# Patient Record
Sex: FEMALE | Race: OTHER | HISPANIC OR LATINO | URBAN - METROPOLITAN AREA
[De-identification: names, ages, dates, MRNs, and addresses within clinical notes are randomized per-mention and may not be internally consistent; named-entity substitution may affect disease eponyms.]

---

## 2017-01-09 ENCOUNTER — OUTPATIENT (OUTPATIENT)
Dept: OUTPATIENT SERVICES | Facility: HOSPITAL | Age: 57
LOS: 1 days | End: 2017-01-09
Payer: SELF-PAY

## 2017-01-09 ENCOUNTER — APPOINTMENT (OUTPATIENT)
Dept: FAMILY MEDICINE | Facility: HOSPITAL | Age: 57
End: 2017-01-09

## 2017-01-09 VITALS
BODY MASS INDEX: 25.72 KG/M2 | TEMPERATURE: 98 F | HEART RATE: 72 BPM | WEIGHT: 131 LBS | HEIGHT: 60 IN | RESPIRATION RATE: 14 BRPM | DIASTOLIC BLOOD PRESSURE: 60 MMHG | SYSTOLIC BLOOD PRESSURE: 100 MMHG

## 2017-01-09 DIAGNOSIS — H57.10 OCULAR PAIN, UNSPECIFIED EYE: ICD-10-CM

## 2017-01-09 PROCEDURE — G0463: CPT

## 2017-01-10 ENCOUNTER — OUTPATIENT (OUTPATIENT)
Dept: OUTPATIENT SERVICES | Facility: HOSPITAL | Age: 57
LOS: 1 days | End: 2017-01-10

## 2017-01-19 ENCOUNTER — APPOINTMENT (OUTPATIENT)
Dept: OPHTHALMOLOGY | Facility: CLINIC | Age: 57
End: 2017-01-19

## 2017-03-09 ENCOUNTER — APPOINTMENT (OUTPATIENT)
Dept: NEUROLOGY | Facility: HOSPITAL | Age: 57
End: 2017-03-09

## 2017-03-09 ENCOUNTER — OUTPATIENT (OUTPATIENT)
Dept: OUTPATIENT SERVICES | Facility: HOSPITAL | Age: 57
LOS: 1 days | End: 2017-03-09
Payer: SELF-PAY

## 2017-03-09 VITALS
RESPIRATION RATE: 14 BRPM | WEIGHT: 133 LBS | HEIGHT: 60 IN | HEART RATE: 76 BPM | BODY MASS INDEX: 26.11 KG/M2 | SYSTOLIC BLOOD PRESSURE: 102 MMHG | DIASTOLIC BLOOD PRESSURE: 70 MMHG

## 2017-03-09 DIAGNOSIS — D44.3 NEOPLASM OF UNCERTAIN BEHAVIOR OF PITUITARY GLAND: ICD-10-CM

## 2017-03-09 DIAGNOSIS — G44.099 OTHER TRIGEMINAL AUTONOMIC CEPHALGIAS (TAC), NOT INTRACTABLE: ICD-10-CM

## 2017-03-09 DIAGNOSIS — E78.5 HYPERLIPIDEMIA, UNSPECIFIED: ICD-10-CM

## 2017-03-09 DIAGNOSIS — R51 HEADACHE: ICD-10-CM

## 2017-03-09 DIAGNOSIS — Z87.898 PERSONAL HISTORY OF OTHER SPECIFIED CONDITIONS: ICD-10-CM

## 2017-03-09 PROCEDURE — G0463: CPT

## 2017-03-09 RX ORDER — TOBRAMYCIN 3 MG/ML
0.3 SOLUTION/ DROPS OPHTHALMIC 4 TIMES DAILY
Qty: 1 | Refills: 0 | Status: DISCONTINUED | COMMUNITY
Start: 2017-01-09 | End: 2017-03-09

## 2017-05-08 DIAGNOSIS — H04.309 UNSPECIFIED DACRYOCYSTITIS OF UNSPECIFIED LACRIMAL PASSAGE: ICD-10-CM

## 2017-06-07 ENCOUNTER — EMERGENCY (EMERGENCY)
Facility: HOSPITAL | Age: 57
LOS: 1 days | Discharge: HOME | End: 2017-06-07
Admitting: EMERGENCY MEDICINE
Payer: MEDICAID

## 2017-06-07 DIAGNOSIS — R42 DIZZINESS AND GIDDINESS: ICD-10-CM

## 2017-06-07 DIAGNOSIS — K57.32 DIVERTICULITIS OF LARGE INTESTINE WITHOUT PERFORATION OR ABSCESS WITHOUT BLEEDING: ICD-10-CM

## 2017-06-07 DIAGNOSIS — Z79.2 LONG TERM (CURRENT) USE OF ANTIBIOTICS: ICD-10-CM

## 2017-06-07 DIAGNOSIS — Z88.5 ALLERGY STATUS TO NARCOTIC AGENT: ICD-10-CM

## 2017-06-07 DIAGNOSIS — R10.9 UNSPECIFIED ABDOMINAL PAIN: ICD-10-CM

## 2017-06-07 PROCEDURE — 74177 CT ABD & PELVIS W/CONTRAST: CPT | Mod: 26

## 2017-06-07 PROCEDURE — 99285 EMERGENCY DEPT VISIT HI MDM: CPT | Mod: 25

## 2017-06-07 PROCEDURE — 99284 EMERGENCY DEPT VISIT MOD MDM: CPT

## 2017-06-07 PROCEDURE — 71010: CPT | Mod: 26

## 2017-06-08 PROCEDURE — 87086 URINE CULTURE/COLONY COUNT: CPT

## 2017-06-08 PROCEDURE — 85027 COMPLETE CBC AUTOMATED: CPT

## 2017-06-08 PROCEDURE — 83605 ASSAY OF LACTIC ACID: CPT

## 2017-06-08 PROCEDURE — 96374 THER/PROPH/DIAG INJ IV PUSH: CPT

## 2017-06-08 PROCEDURE — 99284 EMERGENCY DEPT VISIT MOD MDM: CPT | Mod: 25

## 2017-06-08 PROCEDURE — 80076 HEPATIC FUNCTION PANEL: CPT

## 2017-06-08 PROCEDURE — 74177 CT ABD & PELVIS W/CONTRAST: CPT

## 2017-06-08 PROCEDURE — 85730 THROMBOPLASTIN TIME PARTIAL: CPT

## 2017-06-08 PROCEDURE — 80048 BASIC METABOLIC PNL TOTAL CA: CPT

## 2017-06-08 PROCEDURE — 71045 X-RAY EXAM CHEST 1 VIEW: CPT

## 2017-06-08 PROCEDURE — 96374 THER/PROPH/DIAG INJ IV PUSH: CPT | Mod: XU

## 2017-06-08 PROCEDURE — 80053 COMPREHEN METABOLIC PANEL: CPT

## 2017-06-08 PROCEDURE — 81003 URINALYSIS AUTO W/O SCOPE: CPT

## 2017-06-08 PROCEDURE — 82150 ASSAY OF AMYLASE: CPT

## 2017-06-08 PROCEDURE — 83690 ASSAY OF LIPASE: CPT

## 2017-06-08 PROCEDURE — 96375 TX/PRO/DX INJ NEW DRUG ADDON: CPT

## 2017-06-08 PROCEDURE — 96361 HYDRATE IV INFUSION ADD-ON: CPT

## 2017-06-08 PROCEDURE — 85610 PROTHROMBIN TIME: CPT

## 2017-06-09 ENCOUNTER — APPOINTMENT (OUTPATIENT)
Dept: FAMILY MEDICINE | Facility: HOSPITAL | Age: 57
End: 2017-06-09

## 2017-06-09 ENCOUNTER — OUTPATIENT (OUTPATIENT)
Dept: OUTPATIENT SERVICES | Facility: HOSPITAL | Age: 57
LOS: 1 days | End: 2017-06-09
Payer: SELF-PAY

## 2017-06-09 VITALS
OXYGEN SATURATION: 99 % | WEIGHT: 137 LBS | HEIGHT: 60 IN | SYSTOLIC BLOOD PRESSURE: 101 MMHG | HEART RATE: 78 BPM | BODY MASS INDEX: 26.9 KG/M2 | DIASTOLIC BLOOD PRESSURE: 73 MMHG | TEMPERATURE: 97 F | RESPIRATION RATE: 14 BRPM

## 2017-06-09 DIAGNOSIS — K57.92 DIVERTICULITIS OF INTESTINE, PART UNSPECIFIED, WITHOUT PERFORATION OR ABSCESS WITHOUT BLEEDING: ICD-10-CM

## 2017-06-09 PROCEDURE — G0463: CPT

## 2017-06-11 ENCOUNTER — FORM ENCOUNTER (OUTPATIENT)
Age: 57
End: 2017-06-11

## 2017-06-12 ENCOUNTER — OUTPATIENT (OUTPATIENT)
Dept: OUTPATIENT SERVICES | Facility: HOSPITAL | Age: 57
LOS: 1 days | End: 2017-06-12
Payer: SELF-PAY

## 2017-06-12 ENCOUNTER — APPOINTMENT (OUTPATIENT)
Dept: MAMMOGRAPHY | Facility: HOSPITAL | Age: 57
End: 2017-06-12

## 2017-06-12 DIAGNOSIS — Z12.31 ENCOUNTER FOR SCREENING MAMMOGRAM FOR MALIGNANT NEOPLASM OF BREAST: ICD-10-CM

## 2017-06-12 PROCEDURE — 77063 BREAST TOMOSYNTHESIS BI: CPT

## 2017-06-12 PROCEDURE — 77067 SCR MAMMO BI INCL CAD: CPT

## 2017-06-15 ENCOUNTER — APPOINTMENT (OUTPATIENT)
Dept: ENDOCRINOLOGY | Facility: HOSPITAL | Age: 57
End: 2017-06-15

## 2017-06-15 ENCOUNTER — OUTPATIENT (OUTPATIENT)
Dept: OUTPATIENT SERVICES | Facility: HOSPITAL | Age: 57
LOS: 1 days | End: 2017-06-15
Payer: SELF-PAY

## 2017-06-15 VITALS
HEART RATE: 70 BPM | BODY MASS INDEX: 25.32 KG/M2 | SYSTOLIC BLOOD PRESSURE: 120 MMHG | HEIGHT: 60 IN | DIASTOLIC BLOOD PRESSURE: 70 MMHG | WEIGHT: 129 LBS

## 2017-06-15 DIAGNOSIS — E06.9 THYROIDITIS, UNSPECIFIED: ICD-10-CM

## 2017-06-15 DIAGNOSIS — M85.80 OTHER SPECIFIED DISORDERS OF BONE DENSITY AND STRUCTURE, UNSPECIFIED SITE: ICD-10-CM

## 2017-06-15 DIAGNOSIS — E03.8 OTHER SPECIFIED HYPOTHYROIDISM: ICD-10-CM

## 2017-06-15 LAB
24R-OH-CALCIDIOL SERPL-MCNC: 40 NG/ML — SIGNIFICANT CHANGE UP (ref 30–100)
ALBUMIN SERPL ELPH-MCNC: 4.5 G/DL — SIGNIFICANT CHANGE UP (ref 3.3–5)
ALP SERPL-CCNC: 72 U/L — SIGNIFICANT CHANGE UP (ref 40–120)
ALT FLD-CCNC: 30 U/L — SIGNIFICANT CHANGE UP (ref 10–45)
ANION GAP SERPL CALC-SCNC: 14 MMOL/L — SIGNIFICANT CHANGE UP (ref 5–17)
AST SERPL-CCNC: 39 U/L — SIGNIFICANT CHANGE UP (ref 10–40)
BASOPHILS # BLD AUTO: 0.02 K/UL — SIGNIFICANT CHANGE UP (ref 0–0.2)
BASOPHILS NFR BLD AUTO: 0.4 % — SIGNIFICANT CHANGE UP (ref 0–2)
BILIRUB SERPL-MCNC: 0.4 MG/DL — SIGNIFICANT CHANGE UP (ref 0.2–1.2)
BUN SERPL-MCNC: 16 MG/DL — SIGNIFICANT CHANGE UP (ref 7–23)
CALCIUM SERPL-MCNC: 10.5 MG/DL — SIGNIFICANT CHANGE UP (ref 8.4–10.5)
CHLORIDE SERPL-SCNC: 103 MMOL/L — SIGNIFICANT CHANGE UP (ref 96–108)
CHOLEST SERPL-MCNC: 187 MG/DL — SIGNIFICANT CHANGE UP (ref 10–199)
CO2 SERPL-SCNC: 26 MMOL/L — SIGNIFICANT CHANGE UP (ref 22–31)
CREAT SERPL-MCNC: 0.83 MG/DL — SIGNIFICANT CHANGE UP (ref 0.5–1.3)
EOSINOPHIL # BLD AUTO: 0.08 K/UL — SIGNIFICANT CHANGE UP (ref 0–0.5)
EOSINOPHIL NFR BLD AUTO: 1.7 % — SIGNIFICANT CHANGE UP (ref 0–6)
GLUCOSE SERPL-MCNC: 88 MG/DL — SIGNIFICANT CHANGE UP (ref 70–99)
HCT VFR BLD CALC: 42.6 % — SIGNIFICANT CHANGE UP (ref 34.5–45)
HDLC SERPL-MCNC: 51 MG/DL — SIGNIFICANT CHANGE UP (ref 40–125)
HGB BLD-MCNC: 14.3 G/DL — SIGNIFICANT CHANGE UP (ref 11.5–15.5)
IMM GRANULOCYTES NFR BLD AUTO: 0 % — SIGNIFICANT CHANGE UP (ref 0–1.5)
LIPID PNL WITH DIRECT LDL SERPL: 109 MG/DL — SIGNIFICANT CHANGE UP
LYMPHOCYTES # BLD AUTO: 1.92 K/UL — SIGNIFICANT CHANGE UP (ref 1–3.3)
LYMPHOCYTES # BLD AUTO: 41.2 % — SIGNIFICANT CHANGE UP (ref 13–44)
MCHC RBC-ENTMCNC: 29.4 PG — SIGNIFICANT CHANGE UP (ref 27–34)
MCHC RBC-ENTMCNC: 33.6 GM/DL — SIGNIFICANT CHANGE UP (ref 32–36)
MCV RBC AUTO: 87.7 FL — SIGNIFICANT CHANGE UP (ref 80–100)
MONOCYTES # BLD AUTO: 0.31 K/UL — SIGNIFICANT CHANGE UP (ref 0–0.9)
MONOCYTES NFR BLD AUTO: 6.7 % — SIGNIFICANT CHANGE UP (ref 2–14)
NEUTROPHILS # BLD AUTO: 2.33 K/UL — SIGNIFICANT CHANGE UP (ref 1.8–7.4)
NEUTROPHILS NFR BLD AUTO: 50 % — SIGNIFICANT CHANGE UP (ref 43–77)
PLATELET # BLD AUTO: 392 K/UL — SIGNIFICANT CHANGE UP (ref 150–400)
POTASSIUM SERPL-MCNC: 5.2 MMOL/L — SIGNIFICANT CHANGE UP (ref 3.5–5.3)
POTASSIUM SERPL-SCNC: 5.2 MMOL/L — SIGNIFICANT CHANGE UP (ref 3.5–5.3)
PROT SERPL-MCNC: 8.2 G/DL — SIGNIFICANT CHANGE UP (ref 6–8.3)
RBC # BLD: 4.86 M/UL — SIGNIFICANT CHANGE UP (ref 3.8–5.2)
RBC # FLD: 13.4 % — SIGNIFICANT CHANGE UP (ref 10.3–14.5)
SODIUM SERPL-SCNC: 143 MMOL/L — SIGNIFICANT CHANGE UP (ref 135–145)
T4 FREE SERPL-MCNC: 1.4 NG/DL — SIGNIFICANT CHANGE UP (ref 0.9–1.8)
TOTAL CHOLESTEROL/HDL RATIO MEASUREMENT: 3.7 RATIO — SIGNIFICANT CHANGE UP (ref 3.3–7.1)
TRIGL SERPL-MCNC: 136 MG/DL — SIGNIFICANT CHANGE UP (ref 10–149)
WBC # BLD: 4.66 K/UL — SIGNIFICANT CHANGE UP (ref 3.8–10.5)
WBC # FLD AUTO: 4.66 K/UL — SIGNIFICANT CHANGE UP (ref 3.8–10.5)

## 2017-06-15 PROCEDURE — 80053 COMPREHEN METABOLIC PANEL: CPT

## 2017-06-15 PROCEDURE — 36415 COLL VENOUS BLD VENIPUNCTURE: CPT

## 2017-06-15 PROCEDURE — G0463: CPT

## 2017-06-15 PROCEDURE — 85027 COMPLETE CBC AUTOMATED: CPT

## 2017-06-15 PROCEDURE — 82306 VITAMIN D 25 HYDROXY: CPT

## 2017-06-15 PROCEDURE — 80061 LIPID PANEL: CPT

## 2017-06-15 PROCEDURE — 84439 ASSAY OF FREE THYROXINE: CPT

## 2017-06-21 ENCOUNTER — APPOINTMENT (OUTPATIENT)
Dept: FAMILY MEDICINE | Facility: HOSPITAL | Age: 57
End: 2017-06-21

## 2017-06-30 ENCOUNTER — EMERGENCY (EMERGENCY)
Facility: HOSPITAL | Age: 57
LOS: 1 days | Discharge: HOME | End: 2017-06-30
Admitting: EMERGENCY MEDICINE
Payer: MEDICAID

## 2017-06-30 DIAGNOSIS — M25.50 PAIN IN UNSPECIFIED JOINT: ICD-10-CM

## 2017-06-30 DIAGNOSIS — Z79.899 OTHER LONG TERM (CURRENT) DRUG THERAPY: ICD-10-CM

## 2017-06-30 DIAGNOSIS — Z88.5 ALLERGY STATUS TO NARCOTIC AGENT: ICD-10-CM

## 2017-06-30 DIAGNOSIS — R10.84 GENERALIZED ABDOMINAL PAIN: ICD-10-CM

## 2017-06-30 DIAGNOSIS — E78.00 PURE HYPERCHOLESTEROLEMIA, UNSPECIFIED: ICD-10-CM

## 2017-06-30 DIAGNOSIS — K57.32 DIVERTICULITIS OF LARGE INTESTINE WITHOUT PERFORATION OR ABSCESS WITHOUT BLEEDING: ICD-10-CM

## 2017-06-30 PROCEDURE — 83690 ASSAY OF LIPASE: CPT

## 2017-06-30 PROCEDURE — 99284 EMERGENCY DEPT VISIT MOD MDM: CPT | Mod: 25

## 2017-06-30 PROCEDURE — 71046 X-RAY EXAM CHEST 2 VIEWS: CPT

## 2017-06-30 PROCEDURE — 71020: CPT | Mod: 26

## 2017-06-30 PROCEDURE — 80053 COMPREHEN METABOLIC PANEL: CPT

## 2017-06-30 PROCEDURE — 74177 CT ABD & PELVIS W/CONTRAST: CPT

## 2017-06-30 PROCEDURE — 87086 URINE CULTURE/COLONY COUNT: CPT

## 2017-06-30 PROCEDURE — 96361 HYDRATE IV INFUSION ADD-ON: CPT

## 2017-06-30 PROCEDURE — 81003 URINALYSIS AUTO W/O SCOPE: CPT

## 2017-06-30 PROCEDURE — 96374 THER/PROPH/DIAG INJ IV PUSH: CPT | Mod: XU

## 2017-06-30 PROCEDURE — 85027 COMPLETE CBC AUTOMATED: CPT

## 2017-06-30 PROCEDURE — 85610 PROTHROMBIN TIME: CPT

## 2017-06-30 PROCEDURE — 99285 EMERGENCY DEPT VISIT HI MDM: CPT

## 2017-06-30 PROCEDURE — 85730 THROMBOPLASTIN TIME PARTIAL: CPT

## 2017-06-30 PROCEDURE — 74177 CT ABD & PELVIS W/CONTRAST: CPT | Mod: 26

## 2017-07-10 ENCOUNTER — OUTPATIENT (OUTPATIENT)
Dept: OUTPATIENT SERVICES | Facility: HOSPITAL | Age: 57
LOS: 1 days | End: 2017-07-10
Payer: MEDICAID

## 2017-07-10 ENCOUNTER — APPOINTMENT (OUTPATIENT)
Dept: RADIOLOGY | Facility: HOSPITAL | Age: 57
End: 2017-07-10

## 2017-07-10 DIAGNOSIS — M85.88 OTHER SPECIFIED DISORDERS OF BONE DENSITY AND STRUCTURE, OTHER SITE: ICD-10-CM

## 2017-07-10 DIAGNOSIS — Z78.0 ASYMPTOMATIC MENOPAUSAL STATE: ICD-10-CM

## 2017-07-13 ENCOUNTER — APPOINTMENT (OUTPATIENT)
Dept: NEUROLOGY | Facility: HOSPITAL | Age: 57
End: 2017-07-13

## 2017-07-19 ENCOUNTER — OUTPATIENT (OUTPATIENT)
Dept: OUTPATIENT SERVICES | Facility: HOSPITAL | Age: 57
LOS: 1 days | End: 2017-07-19
Payer: SELF-PAY

## 2017-07-19 ENCOUNTER — APPOINTMENT (OUTPATIENT)
Dept: FAMILY MEDICINE | Facility: HOSPITAL | Age: 57
End: 2017-07-19

## 2017-07-19 VITALS
OXYGEN SATURATION: 100 % | SYSTOLIC BLOOD PRESSURE: 148 MMHG | HEART RATE: 84 BPM | RESPIRATION RATE: 14 BRPM | TEMPERATURE: 97.6 F | DIASTOLIC BLOOD PRESSURE: 84 MMHG | WEIGHT: 126 LBS | BODY MASS INDEX: 24.61 KG/M2

## 2017-07-19 DIAGNOSIS — K57.92 DIVERTICULITIS OF INTESTINE, PART UNSPECIFIED, WITHOUT PERFORATION OR ABSCESS WITHOUT BLEEDING: ICD-10-CM

## 2017-07-19 DIAGNOSIS — R10.13 EPIGASTRIC PAIN: ICD-10-CM

## 2017-07-19 RX ORDER — PANTOPRAZOLE 40 MG/1
40 TABLET, DELAYED RELEASE ORAL DAILY
Qty: 30 | Refills: 0 | Status: DISCONTINUED | COMMUNITY
Start: 2017-01-25 | End: 2017-07-19

## 2017-07-19 RX ORDER — DOCUSATE SODIUM 100 MG/1
100 CAPSULE ORAL
Qty: 1 | Refills: 0 | Status: DISCONTINUED | COMMUNITY
Start: 2017-06-09 | End: 2017-07-19

## 2017-07-20 ENCOUNTER — FORM ENCOUNTER (OUTPATIENT)
Age: 57
End: 2017-07-20

## 2017-07-21 PROCEDURE — 76700 US EXAM ABDOM COMPLETE: CPT

## 2017-07-21 PROCEDURE — G0463: CPT

## 2017-08-01 ENCOUNTER — APPOINTMENT (OUTPATIENT)
Dept: GASTROENTEROLOGY | Facility: HOSPITAL | Age: 57
End: 2017-08-01

## 2017-08-01 ENCOUNTER — OUTPATIENT (OUTPATIENT)
Dept: OUTPATIENT SERVICES | Facility: HOSPITAL | Age: 57
LOS: 1 days | End: 2017-08-01
Payer: SELF-PAY

## 2017-08-01 VITALS
HEIGHT: 60 IN | HEART RATE: 83 BPM | DIASTOLIC BLOOD PRESSURE: 71 MMHG | BODY MASS INDEX: 24.15 KG/M2 | WEIGHT: 123 LBS | SYSTOLIC BLOOD PRESSURE: 107 MMHG

## 2017-08-01 DIAGNOSIS — R10.9 UNSPECIFIED ABDOMINAL PAIN: ICD-10-CM

## 2017-08-01 DIAGNOSIS — R10.11 RIGHT UPPER QUADRANT PAIN: ICD-10-CM

## 2017-08-01 LAB
ALBUMIN SERPL ELPH-MCNC: 4.3 G/DL — SIGNIFICANT CHANGE UP (ref 3.3–5)
ALP SERPL-CCNC: 75 U/L — SIGNIFICANT CHANGE UP (ref 40–120)
ALT FLD-CCNC: 10 U/L — SIGNIFICANT CHANGE UP (ref 10–45)
AMYLASE P1 CFR SERPL: 57 U/L — SIGNIFICANT CHANGE UP (ref 25–125)
ANION GAP SERPL CALC-SCNC: 12 MMOL/L — SIGNIFICANT CHANGE UP (ref 5–17)
AST SERPL-CCNC: 23 U/L — SIGNIFICANT CHANGE UP (ref 10–40)
BILIRUB SERPL-MCNC: 0.2 MG/DL — SIGNIFICANT CHANGE UP (ref 0.2–1.2)
BUN SERPL-MCNC: 16 MG/DL — SIGNIFICANT CHANGE UP (ref 7–23)
CALCIUM SERPL-MCNC: 9.7 MG/DL — SIGNIFICANT CHANGE UP (ref 8.4–10.5)
CHLORIDE SERPL-SCNC: 99 MMOL/L — SIGNIFICANT CHANGE UP (ref 96–108)
CHOLEST SERPL-MCNC: 201 MG/DL — HIGH (ref 10–199)
CO2 SERPL-SCNC: 28 MMOL/L — SIGNIFICANT CHANGE UP (ref 22–31)
CREAT SERPL-MCNC: 0.77 MG/DL — SIGNIFICANT CHANGE UP (ref 0.5–1.3)
CRP SERPL-MCNC: <0.2 MG/DL — SIGNIFICANT CHANGE UP (ref 0–0.4)
ERYTHROCYTE [SEDIMENTATION RATE] IN BLOOD: 9 MM/HR — SIGNIFICANT CHANGE UP (ref 0–20)
GLUCOSE SERPL-MCNC: 95 MG/DL — SIGNIFICANT CHANGE UP (ref 70–99)
HBA1C BLD-MCNC: 5.3 % — SIGNIFICANT CHANGE UP (ref 4–5.6)
HDLC SERPL-MCNC: 32 MG/DL — LOW (ref 40–125)
LIDOCAIN IGE QN: 35 U/L — SIGNIFICANT CHANGE UP (ref 7–60)
LIPID PNL WITH DIRECT LDL SERPL: 116 MG/DL — SIGNIFICANT CHANGE UP
POTASSIUM SERPL-MCNC: 4.3 MMOL/L — SIGNIFICANT CHANGE UP (ref 3.5–5.3)
POTASSIUM SERPL-SCNC: 4.3 MMOL/L — SIGNIFICANT CHANGE UP (ref 3.5–5.3)
PROT SERPL-MCNC: 7.9 G/DL — SIGNIFICANT CHANGE UP (ref 6–8.3)
SODIUM SERPL-SCNC: 139 MMOL/L — SIGNIFICANT CHANGE UP (ref 135–145)
TOTAL CHOLESTEROL/HDL RATIO MEASUREMENT: 6.3 RATIO — SIGNIFICANT CHANGE UP (ref 3.3–7.1)
TRIGL SERPL-MCNC: 267 MG/DL — HIGH (ref 10–149)

## 2017-08-01 PROCEDURE — 80061 LIPID PANEL: CPT

## 2017-08-01 PROCEDURE — G0463: CPT

## 2017-08-01 PROCEDURE — 85652 RBC SED RATE AUTOMATED: CPT

## 2017-08-01 PROCEDURE — 80053 COMPREHEN METABOLIC PANEL: CPT

## 2017-08-01 PROCEDURE — 83036 HEMOGLOBIN GLYCOSYLATED A1C: CPT

## 2017-08-01 PROCEDURE — 36415 COLL VENOUS BLD VENIPUNCTURE: CPT

## 2017-08-01 PROCEDURE — 86140 C-REACTIVE PROTEIN: CPT

## 2017-08-01 PROCEDURE — 82150 ASSAY OF AMYLASE: CPT

## 2017-08-01 PROCEDURE — 83690 ASSAY OF LIPASE: CPT

## 2017-08-01 RX ORDER — SULFAMETHOXAZOLE AND TRIMETHOPRIM 800; 160 MG/1; MG/1
800-160 TABLET ORAL
Qty: 28 | Refills: 0 | Status: DISCONTINUED | COMMUNITY
Start: 2017-07-19 | End: 2017-08-01

## 2017-08-02 DIAGNOSIS — K57.90 DIVERTICULOSIS OF INTESTINE, PART UNSPECIFIED, WITHOUT PERFORATION OR ABSCESS WITHOUT BLEEDING: ICD-10-CM

## 2017-08-02 DIAGNOSIS — D12.5 BENIGN NEOPLASM OF SIGMOID COLON: ICD-10-CM

## 2017-08-02 DIAGNOSIS — K76.0 FATTY (CHANGE OF) LIVER, NOT ELSEWHERE CLASSIFIED: ICD-10-CM

## 2017-08-03 ENCOUNTER — RESULT REVIEW (OUTPATIENT)
Age: 57
End: 2017-08-03

## 2017-08-03 ENCOUNTER — OUTPATIENT (OUTPATIENT)
Dept: OUTPATIENT SERVICES | Facility: HOSPITAL | Age: 57
LOS: 1 days | Discharge: HOME | End: 2017-08-03
Payer: SELF-PAY

## 2017-08-03 DIAGNOSIS — R10.11 RIGHT UPPER QUADRANT PAIN: ICD-10-CM

## 2017-08-03 PROCEDURE — 43239 EGD BIOPSY SINGLE/MULTIPLE: CPT | Mod: GC

## 2017-08-03 PROCEDURE — 43239 EGD BIOPSY SINGLE/MULTIPLE: CPT

## 2017-08-03 PROCEDURE — 88305 TISSUE EXAM BY PATHOLOGIST: CPT

## 2017-08-03 PROCEDURE — 88312 SPECIAL STAINS GROUP 1: CPT | Mod: 26

## 2017-08-03 PROCEDURE — 88305 TISSUE EXAM BY PATHOLOGIST: CPT | Mod: 26

## 2017-08-03 PROCEDURE — 88312 SPECIAL STAINS GROUP 1: CPT

## 2017-08-07 ENCOUNTER — APPOINTMENT (OUTPATIENT)
Age: 57
End: 2017-08-07
Payer: SELF-PAY

## 2017-08-07 PROCEDURE — 91200 LIVER ELASTOGRAPHY: CPT

## 2017-08-12 ENCOUNTER — EMERGENCY (EMERGENCY)
Facility: HOSPITAL | Age: 57
LOS: 1 days | Discharge: HOME | End: 2017-08-12
Admitting: EMERGENCY MEDICINE
Payer: MEDICAID

## 2017-08-12 DIAGNOSIS — E78.00 PURE HYPERCHOLESTEROLEMIA, UNSPECIFIED: ICD-10-CM

## 2017-08-12 DIAGNOSIS — R10.13 EPIGASTRIC PAIN: ICD-10-CM

## 2017-08-12 DIAGNOSIS — Z88.5 ALLERGY STATUS TO NARCOTIC AGENT: ICD-10-CM

## 2017-08-12 DIAGNOSIS — Z79.899 OTHER LONG TERM (CURRENT) DRUG THERAPY: ICD-10-CM

## 2017-08-12 DIAGNOSIS — R11.0 NAUSEA: ICD-10-CM

## 2017-08-12 PROCEDURE — 99284 EMERGENCY DEPT VISIT MOD MDM: CPT | Mod: 25

## 2017-08-12 PROCEDURE — 80053 COMPREHEN METABOLIC PANEL: CPT

## 2017-08-12 PROCEDURE — 82150 ASSAY OF AMYLASE: CPT

## 2017-08-12 PROCEDURE — 83690 ASSAY OF LIPASE: CPT

## 2017-08-12 PROCEDURE — 93010 ELECTROCARDIOGRAM REPORT: CPT

## 2017-08-12 PROCEDURE — 96361 HYDRATE IV INFUSION ADD-ON: CPT

## 2017-08-12 PROCEDURE — 93005 ELECTROCARDIOGRAM TRACING: CPT

## 2017-08-12 PROCEDURE — 96375 TX/PRO/DX INJ NEW DRUG ADDON: CPT

## 2017-08-12 PROCEDURE — 96374 THER/PROPH/DIAG INJ IV PUSH: CPT

## 2017-08-12 PROCEDURE — 85027 COMPLETE CBC AUTOMATED: CPT

## 2017-08-13 PROCEDURE — 77080 DXA BONE DENSITY AXIAL: CPT

## 2017-08-15 ENCOUNTER — CHART COPY (OUTPATIENT)
Age: 57
End: 2017-08-15

## 2017-08-17 ENCOUNTER — OUTPATIENT (OUTPATIENT)
Dept: OUTPATIENT SERVICES | Facility: HOSPITAL | Age: 57
LOS: 1 days | End: 2017-08-17
Payer: SELF-PAY

## 2017-08-17 ENCOUNTER — APPOINTMENT (OUTPATIENT)
Dept: FAMILY MEDICINE | Facility: HOSPITAL | Age: 57
End: 2017-08-17

## 2017-08-17 VITALS
SYSTOLIC BLOOD PRESSURE: 100 MMHG | HEART RATE: 70 BPM | RESPIRATION RATE: 14 BRPM | DIASTOLIC BLOOD PRESSURE: 70 MMHG | OXYGEN SATURATION: 100 % | BODY MASS INDEX: 23.83 KG/M2 | TEMPERATURE: 98 F | WEIGHT: 122 LBS

## 2017-08-17 DIAGNOSIS — R10.13 EPIGASTRIC PAIN: ICD-10-CM

## 2017-08-17 DIAGNOSIS — K29.50 UNSPECIFIED CHRONIC GASTRITIS WITHOUT BLEEDING: ICD-10-CM

## 2017-08-17 PROCEDURE — G0463: CPT

## 2017-09-06 ENCOUNTER — RESULT REVIEW (OUTPATIENT)
Age: 57
End: 2017-09-06

## 2017-09-06 ENCOUNTER — OUTPATIENT (OUTPATIENT)
Dept: OUTPATIENT SERVICES | Facility: HOSPITAL | Age: 57
LOS: 1 days | End: 2017-09-06
Payer: SELF-PAY

## 2017-09-06 ENCOUNTER — MED ADMIN CHARGE (OUTPATIENT)
Age: 57
End: 2017-09-06

## 2017-09-06 ENCOUNTER — APPOINTMENT (OUTPATIENT)
Dept: FAMILY MEDICINE | Facility: HOSPITAL | Age: 57
End: 2017-09-06

## 2017-09-06 VITALS
HEART RATE: 88 BPM | TEMPERATURE: 97.7 F | SYSTOLIC BLOOD PRESSURE: 99 MMHG | DIASTOLIC BLOOD PRESSURE: 72 MMHG | OXYGEN SATURATION: 100 % | RESPIRATION RATE: 14 BRPM | BODY MASS INDEX: 23.75 KG/M2 | HEIGHT: 60 IN | WEIGHT: 121 LBS

## 2017-09-06 DIAGNOSIS — Z01.419 ENCOUNTER FOR GYNECOLOGICAL EXAMINATION (GENERAL) (ROUTINE) WITHOUT ABNORMAL FINDINGS: ICD-10-CM

## 2017-09-06 PROCEDURE — G0101: CPT

## 2017-09-06 PROCEDURE — G0463: CPT

## 2017-09-06 PROCEDURE — 87624 HPV HI-RISK TYP POOLED RSLT: CPT

## 2017-09-06 PROCEDURE — 88175 CYTOPATH C/V AUTO FLUID REDO: CPT

## 2017-09-26 LAB
CYTOLOGY CVX/VAG DOC THIN PREP: NORMAL
HPV I/H RISK 3 DNA ANAL QL PROBE+SIG AMP: NORMAL

## 2017-10-17 ENCOUNTER — OUTPATIENT (OUTPATIENT)
Dept: OUTPATIENT SERVICES | Facility: HOSPITAL | Age: 57
LOS: 1 days | End: 2017-10-17
Payer: SELF-PAY

## 2017-10-17 ENCOUNTER — APPOINTMENT (OUTPATIENT)
Dept: GASTROENTEROLOGY | Facility: HOSPITAL | Age: 57
End: 2017-10-17

## 2017-10-17 ENCOUNTER — APPOINTMENT (OUTPATIENT)
Dept: CT IMAGING | Facility: HOSPITAL | Age: 57
End: 2017-10-17

## 2017-10-17 VITALS
HEART RATE: 80 BPM | RESPIRATION RATE: 16 BRPM | HEIGHT: 60 IN | DIASTOLIC BLOOD PRESSURE: 68 MMHG | WEIGHT: 121 LBS | SYSTOLIC BLOOD PRESSURE: 100 MMHG | BODY MASS INDEX: 23.75 KG/M2

## 2017-10-17 DIAGNOSIS — K42.9 UMBILICAL HERNIA WITHOUT OBSTRUCTION OR GANGRENE: ICD-10-CM

## 2017-10-17 DIAGNOSIS — K30 FUNCTIONAL DYSPEPSIA: ICD-10-CM

## 2017-10-17 DIAGNOSIS — K76.0 FATTY (CHANGE OF) LIVER, NOT ELSEWHERE CLASSIFIED: ICD-10-CM

## 2017-10-17 DIAGNOSIS — K57.32 DIVERTICULITIS OF LARGE INTESTINE WITHOUT PERFORATION OR ABSCESS WITHOUT BLEEDING: ICD-10-CM

## 2017-10-17 DIAGNOSIS — K31.9 DISEASE OF STOMACH AND DUODENUM, UNSPECIFIED: ICD-10-CM

## 2017-10-17 LAB
BASOPHILS # BLD AUTO: 0.01 K/UL — SIGNIFICANT CHANGE UP (ref 0–0.2)
BASOPHILS NFR BLD AUTO: 0.2 % — SIGNIFICANT CHANGE UP (ref 0–2)
EOSINOPHIL # BLD AUTO: 0.07 K/UL — SIGNIFICANT CHANGE UP (ref 0–0.5)
EOSINOPHIL NFR BLD AUTO: 1.6 % — SIGNIFICANT CHANGE UP (ref 0–6)
HCT VFR BLD CALC: 39.7 % — SIGNIFICANT CHANGE UP (ref 34.5–45)
HGB BLD-MCNC: 13.5 G/DL — SIGNIFICANT CHANGE UP (ref 11.5–15.5)
IMM GRANULOCYTES NFR BLD AUTO: 0.2 % — SIGNIFICANT CHANGE UP (ref 0–1.5)
LYMPHOCYTES # BLD AUTO: 2.01 K/UL — SIGNIFICANT CHANGE UP (ref 1–3.3)
LYMPHOCYTES # BLD AUTO: 45.7 % — HIGH (ref 13–44)
MCHC RBC-ENTMCNC: 29.9 PG — SIGNIFICANT CHANGE UP (ref 27–34)
MCHC RBC-ENTMCNC: 34 GM/DL — SIGNIFICANT CHANGE UP (ref 32–36)
MCV RBC AUTO: 88 FL — SIGNIFICANT CHANGE UP (ref 80–100)
MONOCYTES # BLD AUTO: 0.38 K/UL — SIGNIFICANT CHANGE UP (ref 0–0.9)
MONOCYTES NFR BLD AUTO: 8.6 % — SIGNIFICANT CHANGE UP (ref 2–14)
NEUTROPHILS # BLD AUTO: 1.92 K/UL — SIGNIFICANT CHANGE UP (ref 1.8–7.4)
NEUTROPHILS NFR BLD AUTO: 43.7 % — SIGNIFICANT CHANGE UP (ref 43–77)
PLATELET # BLD AUTO: 285 K/UL — SIGNIFICANT CHANGE UP (ref 150–400)
RBC # BLD: 4.51 M/UL — SIGNIFICANT CHANGE UP (ref 3.8–5.2)
RBC # FLD: 13.6 % — SIGNIFICANT CHANGE UP (ref 10.3–14.5)
WBC # BLD: 4.4 K/UL — SIGNIFICANT CHANGE UP (ref 3.8–10.5)
WBC # FLD AUTO: 4.4 K/UL — SIGNIFICANT CHANGE UP (ref 3.8–10.5)

## 2017-10-17 PROCEDURE — 36415 COLL VENOUS BLD VENIPUNCTURE: CPT

## 2017-10-17 PROCEDURE — 74177 CT ABD & PELVIS W/CONTRAST: CPT | Mod: 26

## 2017-10-17 PROCEDURE — 74177 CT ABD & PELVIS W/CONTRAST: CPT

## 2017-10-17 PROCEDURE — G0463: CPT

## 2017-10-17 PROCEDURE — 85027 COMPLETE CBC AUTOMATED: CPT

## 2017-10-17 RX ORDER — OMEPRAZOLE 20 MG/1
20 CAPSULE, DELAYED RELEASE ORAL DAILY
Qty: 30 | Refills: 1 | Status: DISCONTINUED | COMMUNITY
Start: 2017-08-17 | End: 2017-10-17

## 2017-10-19 ENCOUNTER — OUTPATIENT (OUTPATIENT)
Dept: OUTPATIENT SERVICES | Facility: HOSPITAL | Age: 57
LOS: 1 days | End: 2017-10-19
Payer: SELF-PAY

## 2017-10-19 ENCOUNTER — RESULT REVIEW (OUTPATIENT)
Age: 57
End: 2017-10-19

## 2017-10-19 DIAGNOSIS — K57.32 DIVERTICULITIS OF LARGE INTESTINE WITHOUT PERFORATION OR ABSCESS WITHOUT BLEEDING: ICD-10-CM

## 2017-10-19 PROCEDURE — 45380 COLONOSCOPY AND BIOPSY: CPT | Mod: GC

## 2017-10-19 PROCEDURE — 88305 TISSUE EXAM BY PATHOLOGIST: CPT

## 2017-10-19 PROCEDURE — 45380 COLONOSCOPY AND BIOPSY: CPT

## 2017-10-19 PROCEDURE — 88305 TISSUE EXAM BY PATHOLOGIST: CPT | Mod: 26

## 2017-10-20 LAB — SURGICAL PATHOLOGY STUDY: SIGNIFICANT CHANGE UP

## 2017-10-26 ENCOUNTER — OUTPATIENT (OUTPATIENT)
Dept: OUTPATIENT SERVICES | Facility: HOSPITAL | Age: 57
LOS: 1 days | End: 2017-10-26
Payer: SELF-PAY

## 2017-10-26 ENCOUNTER — APPOINTMENT (OUTPATIENT)
Dept: FAMILY MEDICINE | Facility: HOSPITAL | Age: 57
End: 2017-10-26

## 2017-10-26 VITALS
HEART RATE: 74 BPM | BODY MASS INDEX: 23.44 KG/M2 | WEIGHT: 120 LBS | RESPIRATION RATE: 12 BRPM | TEMPERATURE: 98.2 F | SYSTOLIC BLOOD PRESSURE: 93 MMHG | DIASTOLIC BLOOD PRESSURE: 66 MMHG | OXYGEN SATURATION: 99 %

## 2017-10-26 DIAGNOSIS — E78.5 HYPERLIPIDEMIA, UNSPECIFIED: ICD-10-CM

## 2017-10-26 DIAGNOSIS — K57.32 DIVERTICULITIS OF LARGE INTESTINE WITHOUT PERFORATION OR ABSCESS WITHOUT BLEEDING: ICD-10-CM

## 2017-10-26 PROCEDURE — 86803 HEPATITIS C AB TEST: CPT

## 2017-10-26 PROCEDURE — 36415 COLL VENOUS BLD VENIPUNCTURE: CPT

## 2017-10-26 PROCEDURE — G0463: CPT

## 2017-10-26 PROCEDURE — 80061 LIPID PANEL: CPT

## 2017-10-26 RX ORDER — MAGNESIUM CITRATE
SOLUTION, ORAL ORAL
Qty: 2 | Refills: 0 | Status: DISCONTINUED | COMMUNITY
Start: 2017-10-17 | End: 2017-10-26

## 2017-11-21 ENCOUNTER — OUTPATIENT (OUTPATIENT)
Dept: OUTPATIENT SERVICES | Facility: HOSPITAL | Age: 57
LOS: 1 days | End: 2017-11-21
Payer: SELF-PAY

## 2017-11-21 ENCOUNTER — APPOINTMENT (OUTPATIENT)
Dept: GASTROENTEROLOGY | Facility: HOSPITAL | Age: 57
End: 2017-11-21

## 2017-11-21 VITALS
BODY MASS INDEX: 23.56 KG/M2 | WEIGHT: 120 LBS | HEIGHT: 60 IN | SYSTOLIC BLOOD PRESSURE: 106 MMHG | HEART RATE: 66 BPM | RESPIRATION RATE: 14 BRPM | DIASTOLIC BLOOD PRESSURE: 69 MMHG

## 2017-11-21 DIAGNOSIS — K31.9 DISEASE OF STOMACH AND DUODENUM, UNSPECIFIED: ICD-10-CM

## 2017-11-21 DIAGNOSIS — Z08 ENCOUNTER FOR FOLLOW-UP EXAMINATION AFTER COMPLETED TREATMENT FOR MALIGNANT NEOPLASM: ICD-10-CM

## 2017-11-21 DIAGNOSIS — K57.90 DIVERTICULOSIS OF INTESTINE, PART UNSPECIFIED, WITHOUT PERFORATION OR ABSCESS WITHOUT BLEEDING: ICD-10-CM

## 2017-11-21 DIAGNOSIS — K76.0 FATTY (CHANGE OF) LIVER, NOT ELSEWHERE CLASSIFIED: ICD-10-CM

## 2017-11-21 PROCEDURE — G0463: CPT

## 2018-02-13 ENCOUNTER — APPOINTMENT (OUTPATIENT)
Dept: GASTROENTEROLOGY | Facility: HOSPITAL | Age: 58
End: 2018-02-13

## 2018-02-14 ENCOUNTER — OUTPATIENT (OUTPATIENT)
Dept: OUTPATIENT SERVICES | Facility: HOSPITAL | Age: 58
LOS: 1 days | End: 2018-02-14
Payer: SELF-PAY

## 2018-02-14 ENCOUNTER — MED ADMIN CHARGE (OUTPATIENT)
Age: 58
End: 2018-02-14

## 2018-02-14 ENCOUNTER — APPOINTMENT (OUTPATIENT)
Dept: FAMILY MEDICINE | Facility: HOSPITAL | Age: 58
End: 2018-02-14

## 2018-02-14 VITALS
DIASTOLIC BLOOD PRESSURE: 69 MMHG | HEIGHT: 60 IN | BODY MASS INDEX: 23.95 KG/M2 | TEMPERATURE: 98.5 F | HEART RATE: 81 BPM | WEIGHT: 122 LBS | SYSTOLIC BLOOD PRESSURE: 108 MMHG | OXYGEN SATURATION: 100 % | RESPIRATION RATE: 14 BRPM

## 2018-02-14 DIAGNOSIS — E03.8 OTHER SPECIFIED HYPOTHYROIDISM: ICD-10-CM

## 2018-02-14 DIAGNOSIS — Z00.00 ENCOUNTER FOR GENERAL ADULT MEDICAL EXAMINATION WITHOUT ABNORMAL FINDINGS: ICD-10-CM

## 2018-02-14 DIAGNOSIS — E04.2 NONTOXIC MULTINODULAR GOITER: ICD-10-CM

## 2018-02-15 DIAGNOSIS — Z23 ENCOUNTER FOR IMMUNIZATION: ICD-10-CM

## 2018-02-15 DIAGNOSIS — E78.5 HYPERLIPIDEMIA, UNSPECIFIED: ICD-10-CM

## 2018-02-15 DIAGNOSIS — G47.00 INSOMNIA, UNSPECIFIED: ICD-10-CM

## 2018-02-15 DIAGNOSIS — E03.8 OTHER SPECIFIED HYPOTHYROIDISM: ICD-10-CM

## 2018-02-15 PROCEDURE — 80061 LIPID PANEL: CPT

## 2018-02-15 PROCEDURE — 86376 MICROSOMAL ANTIBODY EACH: CPT

## 2018-02-15 PROCEDURE — 36415 COLL VENOUS BLD VENIPUNCTURE: CPT

## 2018-02-15 PROCEDURE — 84439 ASSAY OF FREE THYROXINE: CPT

## 2018-02-15 PROCEDURE — 84443 ASSAY THYROID STIM HORMONE: CPT

## 2018-02-15 PROCEDURE — G0463: CPT

## 2018-02-15 PROCEDURE — G0008: CPT

## 2018-03-01 ENCOUNTER — EMERGENCY (EMERGENCY)
Facility: HOSPITAL | Age: 58
LOS: 1 days | Discharge: HOME | End: 2018-03-01
Admitting: INTERNAL MEDICINE
Payer: MEDICAID

## 2018-03-01 DIAGNOSIS — R11.0 NAUSEA: ICD-10-CM

## 2018-03-01 DIAGNOSIS — Z87.19 PERSONAL HISTORY OF OTHER DISEASES OF THE DIGESTIVE SYSTEM: ICD-10-CM

## 2018-03-01 DIAGNOSIS — Z79.899 OTHER LONG TERM (CURRENT) DRUG THERAPY: ICD-10-CM

## 2018-03-01 DIAGNOSIS — E78.00 PURE HYPERCHOLESTEROLEMIA, UNSPECIFIED: ICD-10-CM

## 2018-03-01 DIAGNOSIS — Z88.5 ALLERGY STATUS TO NARCOTIC AGENT: ICD-10-CM

## 2018-03-01 DIAGNOSIS — R10.9 UNSPECIFIED ABDOMINAL PAIN: ICD-10-CM

## 2018-03-01 PROCEDURE — 74176 CT ABD & PELVIS W/O CONTRAST: CPT | Mod: 26

## 2018-03-01 PROCEDURE — 99285 EMERGENCY DEPT VISIT HI MDM: CPT

## 2018-03-02 PROCEDURE — 96361 HYDRATE IV INFUSION ADD-ON: CPT

## 2018-03-02 PROCEDURE — 85610 PROTHROMBIN TIME: CPT

## 2018-03-02 PROCEDURE — 83690 ASSAY OF LIPASE: CPT

## 2018-03-02 PROCEDURE — 80048 BASIC METABOLIC PNL TOTAL CA: CPT

## 2018-03-02 PROCEDURE — 74176 CT ABD & PELVIS W/O CONTRAST: CPT

## 2018-03-02 PROCEDURE — 80076 HEPATIC FUNCTION PANEL: CPT

## 2018-03-02 PROCEDURE — 99284 EMERGENCY DEPT VISIT MOD MDM: CPT | Mod: 25

## 2018-03-02 PROCEDURE — 96374 THER/PROPH/DIAG INJ IV PUSH: CPT

## 2018-03-02 PROCEDURE — 82150 ASSAY OF AMYLASE: CPT

## 2018-03-02 PROCEDURE — 85730 THROMBOPLASTIN TIME PARTIAL: CPT

## 2018-03-02 PROCEDURE — 85027 COMPLETE CBC AUTOMATED: CPT

## 2018-03-02 PROCEDURE — 81003 URINALYSIS AUTO W/O SCOPE: CPT

## 2018-03-11 ENCOUNTER — EMERGENCY (EMERGENCY)
Facility: HOSPITAL | Age: 58
LOS: 1 days | Discharge: HOME | End: 2018-03-11
Admitting: EMERGENCY MEDICINE
Payer: MEDICAID

## 2018-03-11 DIAGNOSIS — R10.13 EPIGASTRIC PAIN: ICD-10-CM

## 2018-03-11 DIAGNOSIS — R11.2 NAUSEA WITH VOMITING, UNSPECIFIED: ICD-10-CM

## 2018-03-11 DIAGNOSIS — Z87.19 PERSONAL HISTORY OF OTHER DISEASES OF THE DIGESTIVE SYSTEM: ICD-10-CM

## 2018-03-11 DIAGNOSIS — Z88.5 ALLERGY STATUS TO NARCOTIC AGENT: ICD-10-CM

## 2018-03-11 DIAGNOSIS — E78.00 PURE HYPERCHOLESTEROLEMIA, UNSPECIFIED: ICD-10-CM

## 2018-03-11 DIAGNOSIS — K21.9 GASTRO-ESOPHAGEAL REFLUX DISEASE WITHOUT ESOPHAGITIS: ICD-10-CM

## 2018-03-11 DIAGNOSIS — Z79.899 OTHER LONG TERM (CURRENT) DRUG THERAPY: ICD-10-CM

## 2018-03-11 DIAGNOSIS — R19.7 DIARRHEA, UNSPECIFIED: ICD-10-CM

## 2018-03-11 PROCEDURE — 99284 EMERGENCY DEPT VISIT MOD MDM: CPT | Mod: 25

## 2018-03-12 PROCEDURE — 85730 THROMBOPLASTIN TIME PARTIAL: CPT

## 2018-03-12 PROCEDURE — 85610 PROTHROMBIN TIME: CPT

## 2018-03-12 PROCEDURE — 96375 TX/PRO/DX INJ NEW DRUG ADDON: CPT

## 2018-03-12 PROCEDURE — 81003 URINALYSIS AUTO W/O SCOPE: CPT

## 2018-03-12 PROCEDURE — 96374 THER/PROPH/DIAG INJ IV PUSH: CPT

## 2018-03-12 PROCEDURE — 83690 ASSAY OF LIPASE: CPT

## 2018-03-12 PROCEDURE — 99284 EMERGENCY DEPT VISIT MOD MDM: CPT | Mod: 25

## 2018-03-12 PROCEDURE — 82150 ASSAY OF AMYLASE: CPT

## 2018-03-12 PROCEDURE — 80053 COMPREHEN METABOLIC PANEL: CPT

## 2018-03-12 PROCEDURE — 85027 COMPLETE CBC AUTOMATED: CPT

## 2018-04-12 ENCOUNTER — APPOINTMENT (OUTPATIENT)
Dept: ENDOCRINOLOGY | Facility: HOSPITAL | Age: 58
End: 2018-04-12

## 2018-04-12 ENCOUNTER — OUTPATIENT (OUTPATIENT)
Dept: OUTPATIENT SERVICES | Facility: HOSPITAL | Age: 58
LOS: 1 days | End: 2018-04-12
Payer: SELF-PAY

## 2018-04-12 VITALS
WEIGHT: 120 LBS | HEIGHT: 60 IN | BODY MASS INDEX: 23.56 KG/M2 | DIASTOLIC BLOOD PRESSURE: 81 MMHG | SYSTOLIC BLOOD PRESSURE: 101 MMHG

## 2018-04-12 DIAGNOSIS — L98.9 DISORDER OF THE SKIN AND SUBCUTANEOUS TISSUE, UNSPECIFIED: ICD-10-CM

## 2018-04-12 DIAGNOSIS — E55.9 VITAMIN D DEFICIENCY, UNSPECIFIED: ICD-10-CM

## 2018-04-12 DIAGNOSIS — E03.9 HYPOTHYROIDISM, UNSPECIFIED: ICD-10-CM

## 2018-04-12 DIAGNOSIS — E78.5 HYPERLIPIDEMIA, UNSPECIFIED: ICD-10-CM

## 2018-04-12 DIAGNOSIS — E23.0 HYPOPITUITARISM: ICD-10-CM

## 2018-04-12 DIAGNOSIS — E34.9 ENDOCRINE DISORDER, UNSPECIFIED: ICD-10-CM

## 2018-04-12 DIAGNOSIS — M85.80 OTHER SPECIFIED DISORDERS OF BONE DENSITY AND STRUCTURE, UNSPECIFIED SITE: ICD-10-CM

## 2018-04-12 DIAGNOSIS — E78.1 PURE HYPERGLYCERIDEMIA: ICD-10-CM

## 2018-04-12 LAB
24R-OH-CALCIDIOL SERPL-MCNC: 30 NG/ML — SIGNIFICANT CHANGE UP (ref 30–80)
ALBUMIN SERPL ELPH-MCNC: 4.4 G/DL — SIGNIFICANT CHANGE UP (ref 3.3–5)
ALP SERPL-CCNC: 75 U/L — SIGNIFICANT CHANGE UP (ref 40–120)
ALT FLD-CCNC: 10 U/L — SIGNIFICANT CHANGE UP (ref 10–45)
ANION GAP SERPL CALC-SCNC: 13 MMOL/L — SIGNIFICANT CHANGE UP (ref 5–17)
AST SERPL-CCNC: 23 U/L — SIGNIFICANT CHANGE UP (ref 10–40)
BASOPHILS # BLD AUTO: 0.02 K/UL — SIGNIFICANT CHANGE UP (ref 0–0.2)
BASOPHILS NFR BLD AUTO: 0.4 % — SIGNIFICANT CHANGE UP (ref 0–2)
BILIRUB SERPL-MCNC: 0.6 MG/DL — SIGNIFICANT CHANGE UP (ref 0.2–1.2)
BUN SERPL-MCNC: 16 MG/DL — SIGNIFICANT CHANGE UP (ref 7–23)
CALCIUM SERPL-MCNC: 10.3 MG/DL — SIGNIFICANT CHANGE UP (ref 8.4–10.5)
CHLORIDE SERPL-SCNC: 103 MMOL/L — SIGNIFICANT CHANGE UP (ref 96–108)
CHOLEST SERPL-MCNC: 201 MG/DL — HIGH (ref 10–199)
CO2 SERPL-SCNC: 27 MMOL/L — SIGNIFICANT CHANGE UP (ref 22–31)
CREAT SERPL-MCNC: 0.76 MG/DL — SIGNIFICANT CHANGE UP (ref 0.5–1.3)
EOSINOPHIL # BLD AUTO: 0.09 K/UL — SIGNIFICANT CHANGE UP (ref 0–0.5)
EOSINOPHIL NFR BLD AUTO: 1.9 % — SIGNIFICANT CHANGE UP (ref 0–6)
GLUCOSE SERPL-MCNC: 89 MG/DL — SIGNIFICANT CHANGE UP (ref 70–99)
HCT VFR BLD CALC: 41.6 % — SIGNIFICANT CHANGE UP (ref 34.5–45)
HDLC SERPL-MCNC: 43 MG/DL — SIGNIFICANT CHANGE UP (ref 40–125)
HGB BLD-MCNC: 14.1 G/DL — SIGNIFICANT CHANGE UP (ref 11.5–15.5)
IMM GRANULOCYTES NFR BLD AUTO: 0 % — SIGNIFICANT CHANGE UP (ref 0–1.5)
LIPID PNL WITH DIRECT LDL SERPL: 112 MG/DL — SIGNIFICANT CHANGE UP
LYMPHOCYTES # BLD AUTO: 2.27 K/UL — SIGNIFICANT CHANGE UP (ref 1–3.3)
LYMPHOCYTES # BLD AUTO: 47.1 % — HIGH (ref 13–44)
MCHC RBC-ENTMCNC: 29.6 PG — SIGNIFICANT CHANGE UP (ref 27–34)
MCHC RBC-ENTMCNC: 33.9 GM/DL — SIGNIFICANT CHANGE UP (ref 32–36)
MCV RBC AUTO: 87.2 FL — SIGNIFICANT CHANGE UP (ref 80–100)
MONOCYTES # BLD AUTO: 0.4 K/UL — SIGNIFICANT CHANGE UP (ref 0–0.9)
MONOCYTES NFR BLD AUTO: 8.3 % — SIGNIFICANT CHANGE UP (ref 2–14)
NEUTROPHILS # BLD AUTO: 2.04 K/UL — SIGNIFICANT CHANGE UP (ref 1.8–7.4)
NEUTROPHILS NFR BLD AUTO: 42.3 % — LOW (ref 43–77)
PLATELET # BLD AUTO: 291 K/UL — SIGNIFICANT CHANGE UP (ref 150–400)
POTASSIUM SERPL-MCNC: 5.4 MMOL/L — HIGH (ref 3.5–5.3)
POTASSIUM SERPL-SCNC: 5.4 MMOL/L — HIGH (ref 3.5–5.3)
PROT SERPL-MCNC: 7.7 G/DL — SIGNIFICANT CHANGE UP (ref 6–8.3)
RBC # BLD: 4.77 M/UL — SIGNIFICANT CHANGE UP (ref 3.8–5.2)
RBC # FLD: 13.1 % — SIGNIFICANT CHANGE UP (ref 10.3–14.5)
SODIUM SERPL-SCNC: 143 MMOL/L — SIGNIFICANT CHANGE UP (ref 135–145)
T4 FREE SERPL-MCNC: 1.1 NG/DL — SIGNIFICANT CHANGE UP (ref 0.9–1.8)
TOTAL CHOLESTEROL/HDL RATIO MEASUREMENT: 4.7 RATIO — SIGNIFICANT CHANGE UP (ref 3.3–7.1)
TRIGL SERPL-MCNC: 230 MG/DL — HIGH (ref 10–149)
TSH SERPL-MCNC: 0.85 UIU/ML — SIGNIFICANT CHANGE UP (ref 0.27–4.2)
WBC # BLD: 4.82 K/UL — SIGNIFICANT CHANGE UP (ref 3.8–10.5)
WBC # FLD AUTO: 4.82 K/UL — SIGNIFICANT CHANGE UP (ref 3.8–10.5)

## 2018-04-12 PROCEDURE — 80061 LIPID PANEL: CPT

## 2018-04-12 PROCEDURE — 80053 COMPREHEN METABOLIC PANEL: CPT

## 2018-04-12 PROCEDURE — 82306 VITAMIN D 25 HYDROXY: CPT

## 2018-04-12 PROCEDURE — 84439 ASSAY OF FREE THYROXINE: CPT

## 2018-04-12 PROCEDURE — G0463: CPT

## 2018-04-12 PROCEDURE — 84443 ASSAY THYROID STIM HORMONE: CPT

## 2018-04-12 RX ORDER — ZOLPIDEM TARTRATE 5 MG/1
5 TABLET ORAL
Qty: 1 | Refills: 0 | Status: DISCONTINUED | COMMUNITY
Start: 2018-02-14 | End: 2018-04-12

## 2018-05-30 ENCOUNTER — RX RENEWAL (OUTPATIENT)
Age: 58
End: 2018-05-30

## 2018-05-31 ENCOUNTER — OUTPATIENT (OUTPATIENT)
Dept: OUTPATIENT SERVICES | Facility: HOSPITAL | Age: 58
LOS: 1 days | End: 2018-05-31
Payer: SELF-PAY

## 2018-05-31 DIAGNOSIS — E03.9 HYPOTHYROIDISM, UNSPECIFIED: ICD-10-CM

## 2018-05-31 DIAGNOSIS — E23.0 HYPOPITUITARISM: ICD-10-CM

## 2018-05-31 DIAGNOSIS — M85.80 OTHER SPECIFIED DISORDERS OF BONE DENSITY AND STRUCTURE, UNSPECIFIED SITE: ICD-10-CM

## 2018-05-31 DIAGNOSIS — E78.5 HYPERLIPIDEMIA, UNSPECIFIED: ICD-10-CM

## 2018-05-31 DIAGNOSIS — E78.1 PURE HYPERGLYCERIDEMIA: ICD-10-CM

## 2018-05-31 DIAGNOSIS — E55.9 VITAMIN D DEFICIENCY, UNSPECIFIED: ICD-10-CM

## 2018-05-31 LAB
T3FREE SERPL-MCNC: 2.82 PG/ML — SIGNIFICANT CHANGE UP (ref 1.8–4.6)
TSH SERPL-MCNC: 1.66 UIU/ML — SIGNIFICANT CHANGE UP (ref 0.27–4.2)

## 2018-05-31 PROCEDURE — 84443 ASSAY THYROID STIM HORMONE: CPT

## 2018-05-31 PROCEDURE — 84481 FREE ASSAY (FT-3): CPT

## 2018-06-08 ENCOUNTER — MOBILE ON CALL (OUTPATIENT)
Age: 58
End: 2018-06-08

## 2018-06-08 ENCOUNTER — OTHER (OUTPATIENT)
Age: 58
End: 2018-06-08

## 2018-06-08 RX ORDER — LEVOTHYROXINE SODIUM 0.05 MG/1
50 TABLET ORAL DAILY
Qty: 30 | Refills: 3 | Status: COMPLETED | COMMUNITY
Start: 2018-04-12 | End: 2018-06-08

## 2018-06-12 ENCOUNTER — OUTPATIENT (OUTPATIENT)
Dept: OUTPATIENT SERVICES | Facility: HOSPITAL | Age: 58
LOS: 1 days | End: 2018-06-12
Payer: SELF-PAY

## 2018-06-12 ENCOUNTER — APPOINTMENT (OUTPATIENT)
Dept: FAMILY MEDICINE | Facility: HOSPITAL | Age: 58
End: 2018-06-12

## 2018-06-12 VITALS
SYSTOLIC BLOOD PRESSURE: 121 MMHG | DIASTOLIC BLOOD PRESSURE: 77 MMHG | HEART RATE: 74 BPM | TEMPERATURE: 98.5 F | BODY MASS INDEX: 24.61 KG/M2 | WEIGHT: 126 LBS | RESPIRATION RATE: 16 BRPM | OXYGEN SATURATION: 100 %

## 2018-06-12 DIAGNOSIS — Z00.00 ENCOUNTER FOR GENERAL ADULT MEDICAL EXAMINATION WITHOUT ABNORMAL FINDINGS: ICD-10-CM

## 2018-06-12 PROCEDURE — G0463: CPT

## 2018-06-12 RX ORDER — GREEN TEA/HOODIA GORDONII 315-12.5MG
CAPSULE ORAL
Qty: 1 | Refills: 1 | Status: COMPLETED | COMMUNITY
Start: 2017-07-19 | End: 2018-06-12

## 2018-06-12 NOTE — PHYSICAL EXAM
[No Acute Distress] : no acute distress [Well Nourished] : well nourished [Well Developed] : well developed [Well-Appearing] : well-appearing [Normal Sclera/Conjunctiva] : normal sclera/conjunctiva [PERRL] : pupils equal round and reactive to light [Normal Outer Ear/Nose] : the outer ears and nose were normal in appearance [Normal Oropharynx] : the oropharynx was normal [No JVD] : no jugular venous distention [No Respiratory Distress] : no respiratory distress  [Clear to Auscultation] : lungs were clear to auscultation bilaterally [No Accessory Muscle Use] : no accessory muscle use [Normal Rate] : normal rate  [Regular Rhythm] : with a regular rhythm [Normal S1, S2] : normal S1 and S2 [Soft] : abdomen soft [Non Tender] : non-tender [Normal Bowel Sounds] : normal bowel sounds [Normal Anterior Cervical Nodes] : no anterior cervical lymphadenopathy [No CVA Tenderness] : no CVA  tenderness [No Joint Swelling] : no joint swelling [Grossly Normal Strength/Tone] : grossly normal strength/tone [No Rash] : no rash [Normal Gait] : normal gait [Coordination Grossly Intact] : coordination grossly intact [No Focal Deficits] : no focal deficits

## 2018-06-12 NOTE — COUNSELING
[Weight management counseling provided] : Weight management [Healthy eating counseling provided] : healthy eating [Activity counseling provided] : activity [Walking] : Walking

## 2018-06-12 NOTE — HISTORY OF PRESENT ILLNESS
[FreeTextEntry8] : 57F coming to clinic c/o of intermittent mid back pain x 3 weeks. Pt reports that her back pain started 3 weeks ago and is getting progressively worse. Pt reports her pain is dull intermittent 5/10 with no radiculopathy, numbness, tingling. Pt reports her pain is better with rest, however, is worse with lifting. Pt states taking tylenol helps a little bit. In addition, pt is c/o of bilateral 3/10 dull intermittent knee pain. Pt denied any trauma, heavy lifting, fever, body aches, chills, sob, wheezing, chest pain, palpitations, nausea, vomiting, abdominal pain, diarrhea, urinary incontinence, or difficulty urinating. Pt has no further complaints.   [[employer] ] : provided by [employer]   [FreeTextEntry1] : [id number]

## 2018-06-12 NOTE — HEALTH RISK ASSESSMENT
[] : No [No falls in past year] : Patient reported no falls in the past year [0] : 2) Feeling down, depressed, or hopeless: Not at all (0) [[id number]] : 0

## 2018-06-12 NOTE — ASSESSMENT
[FreeTextEntry1] : 57 F c/o of intermittent dull back pain with b/l knee pain x 3 weeks\par \par #back pain\par - naproxen 500mg with food BID for 10 days\par - continue to stretch and avoid heavy lifting\par - f/u with physical therapy\par - f/u with PCP on june 20, 2018.\par - all questions answered\par - pt agrees with plan \par \par case discussed with Dr. Cowart

## 2018-06-12 NOTE — REVIEW OF SYSTEMS
[Joint Pain] : no joint pain [Joint Stiffness] : no joint stiffness [Joint Swelling] : no joint swelling [Muscle Weakness] : no muscle weakness [Muscle Pain] : muscle pain [Back Pain] : back pain [Skin Rash] : no skin rash [Headache] : no headache [Dizziness] : no dizziness [Fainting] : no fainting [Memory Loss] : no memory loss [Unsteady Walking] : no ataxia [Negative] : Genitourinary

## 2018-06-14 DIAGNOSIS — M54.9 DORSALGIA, UNSPECIFIED: ICD-10-CM

## 2018-06-20 ENCOUNTER — APPOINTMENT (OUTPATIENT)
Dept: FAMILY MEDICINE | Facility: HOSPITAL | Age: 58
End: 2018-06-20

## 2018-06-20 ENCOUNTER — OUTPATIENT (OUTPATIENT)
Dept: OUTPATIENT SERVICES | Facility: HOSPITAL | Age: 58
LOS: 1 days | End: 2018-06-20
Payer: SELF-PAY

## 2018-06-20 VITALS
HEIGHT: 60 IN | TEMPERATURE: 98 F | RESPIRATION RATE: 14 BRPM | BODY MASS INDEX: 24.74 KG/M2 | HEART RATE: 72 BPM | SYSTOLIC BLOOD PRESSURE: 106 MMHG | DIASTOLIC BLOOD PRESSURE: 70 MMHG | OXYGEN SATURATION: 100 % | WEIGHT: 126 LBS

## 2018-06-20 DIAGNOSIS — Z00.00 ENCOUNTER FOR GENERAL ADULT MEDICAL EXAMINATION WITHOUT ABNORMAL FINDINGS: ICD-10-CM

## 2018-06-20 PROCEDURE — G0463: CPT

## 2018-06-20 NOTE — PHYSICAL EXAM
[No Acute Distress] : no acute distress [Well Nourished] : well nourished [Normal Outer Ear/Nose] : the outer ears and nose were normal in appearance [Normal Oropharynx] : the oropharynx was normal [Normal TMs] : both tympanic membranes were normal [Normal Nasal Mucosa] : the nasal mucosa was normal [No JVD] : no jugular venous distention [No Respiratory Distress] : no respiratory distress  [Clear to Auscultation] : lungs were clear to auscultation bilaterally [No Accessory Muscle Use] : no accessory muscle use [Normal Rate] : normal rate  [Regular Rhythm] : with a regular rhythm [Normal S1, S2] : normal S1 and S2 [No Murmur] : no murmur heard [Pedal Pulses Present] : the pedal pulses are present [No Edema] : there was no peripheral edema [Soft] : abdomen soft [Non Tender] : non-tender [Non-distended] : non-distended [No Masses] : no abdominal mass palpated [No HSM] : no HSM [Normal Bowel Sounds] : normal bowel sounds [No CVA Tenderness] : no CVA  tenderness [No Spinal Tenderness] : no spinal tenderness [Kyphosis] : no kyphosis [Scoliosis] : no scoliosis [No Rash] : no rash [Normal Gait] : normal gait [Normal Affect] : the affect was normal [Normal Insight/Judgement] : insight and judgment were intact [de-identified] : Negative leg raise test.

## 2018-06-20 NOTE — ASSESSMENT
[FreeTextEntry1] : #Midthoracic back pain\par -Continue naproxen with food\par -PT referral given as patient was unable to get appointment\par -Printed out exercises for back strengthing\par -RTC 6 months for physical\par \par #Health Maintenance\par -Mammogram referral given

## 2018-06-20 NOTE — HISTORY OF PRESENT ILLNESS
[FreeTextEntry1] : Follow-up back pain [de-identified] : Patient presents for follow-up of midthoracic back pain. Started approximately 1 month ago, gradually, no associated trauma. Band-like pattern over midthoracic region. Has been improving with naproxen, patient was not able to get appointment for physical therapy. No alarm symptoms.

## 2018-06-20 NOTE — HEALTH RISK ASSESSMENT
[] : No [No falls in past year] : Patient reported no falls in the past year [0] : 1) Little interest or pleasure doing things: Not at all (0)

## 2018-06-22 DIAGNOSIS — M54.9 DORSALGIA, UNSPECIFIED: ICD-10-CM

## 2018-06-27 ENCOUNTER — EMERGENCY (EMERGENCY)
Facility: HOSPITAL | Age: 58
LOS: 1 days | Discharge: HOME | End: 2018-06-27
Attending: EMERGENCY MEDICINE | Admitting: EMERGENCY MEDICINE
Payer: MEDICAID

## 2018-06-27 ENCOUNTER — FORM ENCOUNTER (OUTPATIENT)
Age: 58
End: 2018-06-27

## 2018-06-27 VITALS
SYSTOLIC BLOOD PRESSURE: 110 MMHG | OXYGEN SATURATION: 100 % | HEART RATE: 72 BPM | DIASTOLIC BLOOD PRESSURE: 70 MMHG | TEMPERATURE: 98 F | RESPIRATION RATE: 17 BRPM | HEIGHT: 60 IN | WEIGHT: 123.02 LBS

## 2018-06-27 DIAGNOSIS — M54.5 LOW BACK PAIN: ICD-10-CM

## 2018-06-27 PROCEDURE — 96372 THER/PROPH/DIAG INJ SC/IM: CPT

## 2018-06-27 PROCEDURE — 99283 EMERGENCY DEPT VISIT LOW MDM: CPT | Mod: 25

## 2018-06-27 PROCEDURE — 99283 EMERGENCY DEPT VISIT LOW MDM: CPT

## 2018-06-27 PROCEDURE — 72100 X-RAY EXAM L-S SPINE 2/3 VWS: CPT | Mod: 26

## 2018-06-27 PROCEDURE — 72100 X-RAY EXAM L-S SPINE 2/3 VWS: CPT

## 2018-06-27 RX ORDER — KETOROLAC TROMETHAMINE 30 MG/ML
60 SYRINGE (ML) INJECTION ONCE
Qty: 0 | Refills: 0 | Status: DISCONTINUED | OUTPATIENT
Start: 2018-06-27 | End: 2018-06-27

## 2018-06-27 RX ORDER — METHOCARBAMOL 500 MG/1
1 TABLET, FILM COATED ORAL
Qty: 15 | Refills: 0
Start: 2018-06-27 | End: 2018-07-01

## 2018-06-27 RX ORDER — ONDANSETRON 8 MG/1
1 TABLET, FILM COATED ORAL
Qty: 21 | Refills: 0
Start: 2018-06-27 | End: 2018-07-03

## 2018-06-27 RX ADMIN — Medication 60 MILLIGRAM(S): at 23:33

## 2018-06-27 NOTE — ED PROVIDER NOTE - CHPI ED SYMPTOMS NEG
no fatigue/no bowel dysfunction/no constipation/no anorexia/no neck tenderness/no numbness/no bladder dysfunction/no tingling/no motor function loss

## 2018-06-27 NOTE — ED PROVIDER NOTE - ATTENDING CONTRIBUTION TO CARE
HPI: 58 y/o F with h/o chronic back pain presents to Ed c/o worsening lower back pain for few days, no loss of bowel or bladder control, no loss of sensation in legs.  exam : no Lumber spine tenderness, DTR +2 ,   B/L SLR normal   Plan: xay lumber spine , toradol IM and valium PO and reeval.

## 2018-06-27 NOTE — ED PROVIDER NOTE - MEDICAL DECISION MAKING DETAILS
58 y/o F pt with no significant PmHx presents to the ED with c/o non traumatic non radiating chronic 10 out of 10 lower back pain persistently getting worse x 3 months. Lumbar spine x ray, pain medications will re evaluate. 58 y/o F pt with no significant PmHx presents to the ED with c/o non traumatic non radiating chronic 10 out of 10 lower back pain persistently getting worse x 3 months. Lumbar spine x ray, pain medications will re evaluate. Most likely muscular skeletal.

## 2018-06-27 NOTE — ED PROVIDER NOTE - CARE PLAN
Principal Discharge DX:	Low back pain without sciatica, unspecified back pain laterality, unspecified chronicity

## 2018-06-27 NOTE — ED PROVIDER NOTE - OBJECTIVE STATEMENT
58 y/o F pt with no significant PmHx presents to the ED with c/o non traumatic chronic lower back pain persistently getting worse x 3 months. pt reports she saw her PMD who prescribed her naproxen for the pain. She admits the pain continued to get worse bringing her into the ED. She denies abd pain, urinary symptoms, numbness or tingling in her exts, constipation, 56 y/o F pt with no significant PmHx presents to the ED with c/o non traumatic non radiating chronic 10 out of 10 lower back pain persistently getting worse x 3 months. pt reports she saw her PMD who prescribed her naproxen for the pain. She admits the pain continued to get worse bringing her into the ED. She denies abd pain, urinary symptoms, numbness or tingling in her exts, constipation, 56 y/o F pt with no significant PmHx presents to the ED with c/o non traumatic non radiating chronic 10 out of 10 lower back pain persistently getting worse x 3 months. pt reports she saw her PMD who prescribed her naproxen for the pain. She admits the pain continued to get worse bringing her into the ED. She denies abd pain, urinary symptoms, numbness or tingling in her exts, constipation, weakness or neurological symptoms.

## 2018-06-27 NOTE — ED PROVIDER NOTE - PROGRESS NOTE DETAILS
Pt pain decreased with medication. return precautions discussed and understood with patient. x ray showed degenerative changes. Pt will follow up with orthopedics.

## 2018-06-28 ENCOUNTER — APPOINTMENT (OUTPATIENT)
Dept: FAMILY MEDICINE | Facility: HOSPITAL | Age: 58
End: 2018-06-28

## 2018-06-28 ENCOUNTER — OUTPATIENT (OUTPATIENT)
Dept: OUTPATIENT SERVICES | Facility: HOSPITAL | Age: 58
LOS: 1 days | End: 2018-06-28
Payer: SELF-PAY

## 2018-06-28 ENCOUNTER — APPOINTMENT (OUTPATIENT)
Dept: MAMMOGRAPHY | Facility: HOSPITAL | Age: 58
End: 2018-06-28
Payer: COMMERCIAL

## 2018-06-28 VITALS
DIASTOLIC BLOOD PRESSURE: 78 MMHG | SYSTOLIC BLOOD PRESSURE: 118 MMHG | OXYGEN SATURATION: 99 % | HEART RATE: 68 BPM | RESPIRATION RATE: 16 BRPM

## 2018-06-28 VITALS
OXYGEN SATURATION: 98 % | DIASTOLIC BLOOD PRESSURE: 71 MMHG | WEIGHT: 123 LBS | RESPIRATION RATE: 16 BRPM | BODY MASS INDEX: 24.02 KG/M2 | SYSTOLIC BLOOD PRESSURE: 95 MMHG | TEMPERATURE: 98.6 F | HEART RATE: 87 BPM

## 2018-06-28 DIAGNOSIS — Z00.00 ENCOUNTER FOR GENERAL ADULT MEDICAL EXAMINATION WITHOUT ABNORMAL FINDINGS: ICD-10-CM

## 2018-06-28 DIAGNOSIS — Z00.8 ENCOUNTER FOR OTHER GENERAL EXAMINATION: ICD-10-CM

## 2018-06-28 PROCEDURE — G0463: CPT

## 2018-06-28 PROCEDURE — 77063 BREAST TOMOSYNTHESIS BI: CPT | Mod: 26

## 2018-06-28 PROCEDURE — 77067 SCR MAMMO BI INCL CAD: CPT

## 2018-06-28 PROCEDURE — 77063 BREAST TOMOSYNTHESIS BI: CPT

## 2018-06-28 PROCEDURE — 77067 SCR MAMMO BI INCL CAD: CPT | Mod: 26

## 2018-06-28 NOTE — PHYSICAL EXAM
[No Acute Distress] : no acute distress [Well Nourished] : well nourished [Well Developed] : well developed [Well-Appearing] : well-appearing [No Respiratory Distress] : no respiratory distress  [Clear to Auscultation] : lungs were clear to auscultation bilaterally [No Accessory Muscle Use] : no accessory muscle use [Normal Rate] : normal rate  [Regular Rhythm] : with a regular rhythm [Normal S1, S2] : normal S1 and S2 [No Murmur] : no murmur heard [Pedal Pulses Present] : the pedal pulses are present [No Edema] : there was no peripheral edema [Soft] : abdomen soft [Non Tender] : non-tender [Non-distended] : non-distended [No Masses] : no abdominal mass palpated [No HSM] : no HSM [Normal Bowel Sounds] : normal bowel sounds [No CVA Tenderness] : no CVA  tenderness [No Joint Swelling] : no joint swelling [Grossly Normal Strength/Tone] : grossly normal strength/tone [No Rash] : no rash [Normal Gait] : normal gait [No Focal Deficits] : no focal deficits [Normal Affect] : the affect was normal [Normal Insight/Judgement] : insight and judgment were intact [de-identified] : Band like pattern of tenderness over midthoracic spine. No localized spinal tenderness. Leg raise negative for radiating pain but exacerbated lumbar pain.

## 2018-06-28 NOTE — ASSESSMENT
[FreeTextEntry1] : #Subacute back pain, lumbar with acute mid thoracic back pain\par -Imaging negative in ED\par -Start cyclobenzaprine PRN, continue naproxen\par -Encouraged to continue with home PT exercises and to try and get appointment for PT\par -RTC 4-6 weeks, at that point will consider further imaging with MRI\par -Patient may take ranitidine if naproxen irritates stomach.

## 2018-06-28 NOTE — HISTORY OF PRESENT ILLNESS
[FreeTextEntry8] : F/U ER visit for acute back pain.\par Patient has been having lower back pain for the past 6 weeks. Reports that two days ago, she had gradually worsening back pain throughout the day which was severe in intensity. Midthoracic level radiating down to lumbar spine. Was evaluated in the ED, Xray negative for acute fracture but showed stable intervertebral disc space narrowing at L5-S1, with stable anterolisthesis at that level. Was given valium and reports symptoms have improved somewhat today.\par \par Denies sudden awakening as night, fevers, unintentional weight loss, saddle anesthesia, bowel/bladder incontinence.\par Was previously referred for PT but has been unable to get an appointment as of yet.

## 2018-07-02 ENCOUNTER — OUTPATIENT (OUTPATIENT)
Dept: OUTPATIENT SERVICES | Facility: HOSPITAL | Age: 58
LOS: 1 days | End: 2018-07-02

## 2018-07-02 DIAGNOSIS — M54.9 DORSALGIA, UNSPECIFIED: ICD-10-CM

## 2018-07-03 DIAGNOSIS — Z00.00 ENCOUNTER FOR GENERAL ADULT MEDICAL EXAMINATION WITHOUT ABNORMAL FINDINGS: ICD-10-CM

## 2018-07-05 DIAGNOSIS — M54.9 DORSALGIA, UNSPECIFIED: ICD-10-CM

## 2018-07-10 PROCEDURE — 97161 PT EVAL LOW COMPLEX 20 MIN: CPT

## 2018-07-10 PROCEDURE — 97140 MANUAL THERAPY 1/> REGIONS: CPT

## 2018-07-10 PROCEDURE — 97110 THERAPEUTIC EXERCISES: CPT

## 2018-08-30 ENCOUNTER — OUTPATIENT (OUTPATIENT)
Dept: OUTPATIENT SERVICES | Facility: HOSPITAL | Age: 58
LOS: 1 days | End: 2018-08-30
Payer: SELF-PAY

## 2018-08-30 DIAGNOSIS — E23.0 HYPOPITUITARISM: ICD-10-CM

## 2018-08-30 DIAGNOSIS — E78.1 PURE HYPERGLYCERIDEMIA: ICD-10-CM

## 2018-08-30 DIAGNOSIS — E03.9 HYPOTHYROIDISM, UNSPECIFIED: ICD-10-CM

## 2018-08-30 DIAGNOSIS — M85.80 OTHER SPECIFIED DISORDERS OF BONE DENSITY AND STRUCTURE, UNSPECIFIED SITE: ICD-10-CM

## 2018-08-30 DIAGNOSIS — E78.5 HYPERLIPIDEMIA, UNSPECIFIED: ICD-10-CM

## 2018-08-30 LAB
CHOLEST SERPL-MCNC: 200 MG/DL — HIGH (ref 10–199)
HDLC SERPL-MCNC: 37 MG/DL — LOW
LIPID PNL WITH DIRECT LDL SERPL: 112 MG/DL — SIGNIFICANT CHANGE UP
T4 FREE SERPL-MCNC: 1.1 NG/DL — SIGNIFICANT CHANGE UP (ref 0.9–1.8)
TOTAL CHOLESTEROL/HDL RATIO MEASUREMENT: 5.4 RATIO — SIGNIFICANT CHANGE UP (ref 3.3–7.1)
TRIGL SERPL-MCNC: 253 MG/DL — HIGH (ref 10–149)
TSH SERPL-MCNC: 4.07 UIU/ML — SIGNIFICANT CHANGE UP (ref 0.27–4.2)

## 2018-08-30 PROCEDURE — 84439 ASSAY OF FREE THYROXINE: CPT

## 2018-08-30 PROCEDURE — 80061 LIPID PANEL: CPT

## 2018-08-30 PROCEDURE — 84443 ASSAY THYROID STIM HORMONE: CPT

## 2018-10-10 ENCOUNTER — OUTPATIENT (OUTPATIENT)
Dept: OUTPATIENT SERVICES | Facility: HOSPITAL | Age: 58
LOS: 1 days | End: 2018-10-10
Payer: SELF-PAY

## 2018-10-10 ENCOUNTER — APPOINTMENT (OUTPATIENT)
Dept: FAMILY MEDICINE | Facility: HOSPITAL | Age: 58
End: 2018-10-10

## 2018-10-10 VITALS
BODY MASS INDEX: 23.83 KG/M2 | SYSTOLIC BLOOD PRESSURE: 96 MMHG | WEIGHT: 122 LBS | TEMPERATURE: 97.8 F | OXYGEN SATURATION: 100 % | RESPIRATION RATE: 15 BRPM | DIASTOLIC BLOOD PRESSURE: 63 MMHG | HEART RATE: 67 BPM

## 2018-10-10 DIAGNOSIS — Z00.00 ENCOUNTER FOR GENERAL ADULT MEDICAL EXAMINATION WITHOUT ABNORMAL FINDINGS: ICD-10-CM

## 2018-10-10 PROCEDURE — 84443 ASSAY THYROID STIM HORMONE: CPT

## 2018-10-10 PROCEDURE — 84439 ASSAY OF FREE THYROXINE: CPT

## 2018-10-10 PROCEDURE — G0463: CPT

## 2018-10-10 PROCEDURE — 36415 COLL VENOUS BLD VENIPUNCTURE: CPT

## 2018-10-10 RX ORDER — RANITIDINE 150 MG/1
150 TABLET ORAL
Qty: 1 | Refills: 1 | Status: DISCONTINUED | COMMUNITY
Start: 2018-06-28 | End: 2018-10-10

## 2018-10-10 RX ORDER — CYCLOBENZAPRINE HYDROCHLORIDE 10 MG/1
10 TABLET, FILM COATED ORAL 3 TIMES DAILY
Qty: 1 | Refills: 0 | Status: DISCONTINUED | COMMUNITY
Start: 2018-06-28 | End: 2018-10-10

## 2018-10-10 RX ORDER — NAPROXEN 500 MG/1
500 TABLET ORAL
Qty: 14 | Refills: 0 | Status: DISCONTINUED | COMMUNITY
Start: 2018-06-12 | End: 2018-10-10

## 2018-10-10 NOTE — PHYSICAL EXAM
[No Acute Distress] : no acute distress [Well Nourished] : well nourished [Normal Sclera/Conjunctiva] : normal sclera/conjunctiva [EOMI] : extraocular movements intact [Normal Outer Ear/Nose] : the outer ears and nose were normal in appearance [Normal Oropharynx] : the oropharynx was normal [No JVD] : no jugular venous distention [No Respiratory Distress] : no respiratory distress  [Clear to Auscultation] : lungs were clear to auscultation bilaterally [No Accessory Muscle Use] : no accessory muscle use [Normal Rate] : normal rate  [Regular Rhythm] : with a regular rhythm [Normal S1, S2] : normal S1 and S2 [No Murmur] : no murmur heard [Pedal Pulses Present] : the pedal pulses are present [No Edema] : there was no peripheral edema [Soft] : abdomen soft [Non Tender] : non-tender [No Rash] : no rash [Normal Gait] : normal gait [No Focal Deficits] : no focal deficits [Normal Affect] : the affect was normal [Normal Insight/Judgement] : insight and judgment were intact [de-identified] : Mild enlargement of thyroid.

## 2018-10-10 NOTE — ASSESSMENT
[FreeTextEntry1] : #Fatigue secondary to possible hypothyroidism. Differential further includes underlying viral illness, mood related or anemia. \par -Check CBC, TSH, FT4\par -RTC 2-4 weeks if symptoms do not resolve (or sooner if symptoms worsen)\par -Recommended keep diary of symptoms. \par \par #Headaches\par -Take ibuprofen/ tylenol PRN for headaches.

## 2018-10-10 NOTE — REVIEW OF SYSTEMS
[Fever] : no fever [Chills] : no chills [Fatigue] : fatigue [Hot Flashes] : no hot flashes [Night Sweats] : no night sweats [Recent Change In Weight] : ~T no recent weight change [Vision Problems] : no vision problems [Itching] : no itching [Hoarseness] : no hoarseness [Sore Throat] : no sore throat [Chest Pain] : no chest pain [Palpitations] : no palpitations [Leg Claudication] : no leg claudication [Lower Ext Edema] : no lower extremity edema [Shortness Of Breath] : no shortness of breath [Cough] : no cough [Abdominal Pain] : no abdominal pain [Nausea] : nausea [Constipation] : constipation [Diarrhea] : diarrhea [Dysuria] : no dysuria [Incontinence] : no incontinence [Nocturia] : no nocturia [Frequency] : no frequency [Joint Pain] : no joint pain [Hair Changes] : hair changes [Headache] : headache [Dizziness] : no dizziness [Suicidal] : not suicidal [Insomnia] : no insomnia [Anxiety] : no anxiety [Depression] : no depression

## 2018-10-12 DIAGNOSIS — R53.83 OTHER FATIGUE: ICD-10-CM

## 2018-10-12 DIAGNOSIS — E03.9 HYPOTHYROIDISM, UNSPECIFIED: ICD-10-CM

## 2018-10-16 ENCOUNTER — MOBILE ON CALL (OUTPATIENT)
Age: 58
End: 2018-10-16

## 2018-10-16 ENCOUNTER — MESSAGE (OUTPATIENT)
Age: 58
End: 2018-10-16

## 2018-11-05 ENCOUNTER — APPOINTMENT (OUTPATIENT)
Dept: OPHTHALMOLOGY | Facility: CLINIC | Age: 58
End: 2018-11-05

## 2018-11-07 LAB
BASOPHILS # BLD AUTO: 0.02 K/UL
BASOPHILS NFR BLD AUTO: 0.6 %
EOSINOPHIL # BLD AUTO: 0.09 K/UL
EOSINOPHIL NFR BLD AUTO: 2.6 %
HCT VFR BLD CALC: 41.6 %
HGB BLD-MCNC: 13.6 G/DL
IMM GRANULOCYTES NFR BLD AUTO: 0 %
LYMPHOCYTES # BLD AUTO: 1.62 K/UL
LYMPHOCYTES NFR BLD AUTO: 46.8 %
MAN DIFF?: NORMAL
MCHC RBC-ENTMCNC: 28.8 PG
MCHC RBC-ENTMCNC: 32.7 GM/DL
MCV RBC AUTO: 88.1 FL
MONOCYTES # BLD AUTO: 0.28 K/UL
MONOCYTES NFR BLD AUTO: 8.1 %
NEUTROPHILS # BLD AUTO: 1.45 K/UL
NEUTROPHILS NFR BLD AUTO: 41.9 %
PLATELET # BLD AUTO: 274 K/UL
RBC # BLD: 4.72 M/UL
RBC # FLD: 13 %
T4 FREE SERPL-MCNC: 1 NG/DL
TSH SERPL-ACNC: 2.28 UIU/ML
WBC # FLD AUTO: 3.46 K/UL

## 2018-12-06 ENCOUNTER — EMERGENCY (EMERGENCY)
Facility: HOSPITAL | Age: 58
LOS: 1 days | Discharge: HOME | End: 2018-12-06
Attending: EMERGENCY MEDICINE
Payer: MEDICAID

## 2018-12-06 ENCOUNTER — OUTPATIENT (OUTPATIENT)
Dept: OUTPATIENT SERVICES | Facility: HOSPITAL | Age: 58
LOS: 1 days | End: 2018-12-06
Payer: SELF-PAY

## 2018-12-06 ENCOUNTER — APPOINTMENT (OUTPATIENT)
Dept: ENDOCRINOLOGY | Facility: HOSPITAL | Age: 58
End: 2018-12-06

## 2018-12-06 VITALS
DIASTOLIC BLOOD PRESSURE: 75 MMHG | HEIGHT: 60 IN | SYSTOLIC BLOOD PRESSURE: 103 MMHG | TEMPERATURE: 98 F | HEART RATE: 70 BPM | WEIGHT: 119.93 LBS | RESPIRATION RATE: 18 BRPM | OXYGEN SATURATION: 100 %

## 2018-12-06 VITALS
HEART RATE: 83 BPM | DIASTOLIC BLOOD PRESSURE: 75 MMHG | RESPIRATION RATE: 14 BRPM | WEIGHT: 123 LBS | BODY MASS INDEX: 24.15 KG/M2 | SYSTOLIC BLOOD PRESSURE: 107 MMHG | HEIGHT: 60 IN

## 2018-12-06 VITALS
TEMPERATURE: 98 F | DIASTOLIC BLOOD PRESSURE: 78 MMHG | OXYGEN SATURATION: 99 % | HEART RATE: 69 BPM | RESPIRATION RATE: 16 BRPM | SYSTOLIC BLOOD PRESSURE: 116 MMHG

## 2018-12-06 DIAGNOSIS — E34.9 ENDOCRINE DISORDER, UNSPECIFIED: ICD-10-CM

## 2018-12-06 DIAGNOSIS — D44.3 NEOPLASM OF UNCERTAIN BEHAVIOR OF PITUITARY GLAND: ICD-10-CM

## 2018-12-06 DIAGNOSIS — M85.80 OTHER SPECIFIED DISORDERS OF BONE DENSITY AND STRUCTURE, UNSPECIFIED SITE: ICD-10-CM

## 2018-12-06 DIAGNOSIS — E23.7 DISORDER OF PITUITARY GLAND, UNSPECIFIED: ICD-10-CM

## 2018-12-06 DIAGNOSIS — E23.0 HYPOPITUITARISM: ICD-10-CM

## 2018-12-06 DIAGNOSIS — R07.9 CHEST PAIN, UNSPECIFIED: ICD-10-CM

## 2018-12-06 LAB
ALBUMIN SERPL ELPH-MCNC: 4.9 G/DL — SIGNIFICANT CHANGE UP (ref 3.3–5)
ALP SERPL-CCNC: 75 U/L — SIGNIFICANT CHANGE UP (ref 40–120)
ALT FLD-CCNC: 13 U/L — SIGNIFICANT CHANGE UP (ref 10–45)
ANION GAP SERPL CALC-SCNC: 11 MMOL/L — SIGNIFICANT CHANGE UP (ref 5–17)
APTT BLD: 34.5 SEC — SIGNIFICANT CHANGE UP (ref 27.5–36.3)
AST SERPL-CCNC: 22 U/L — SIGNIFICANT CHANGE UP (ref 10–40)
BASOPHILS # BLD AUTO: 0 K/UL — SIGNIFICANT CHANGE UP (ref 0–0.2)
BASOPHILS NFR BLD AUTO: 0.2 % — SIGNIFICANT CHANGE UP (ref 0–2)
BILIRUB SERPL-MCNC: 0.6 MG/DL — SIGNIFICANT CHANGE UP (ref 0.2–1.2)
BUN SERPL-MCNC: 12 MG/DL — SIGNIFICANT CHANGE UP (ref 7–23)
CALCIUM SERPL-MCNC: 10.6 MG/DL — HIGH (ref 8.4–10.5)
CHLORIDE SERPL-SCNC: 100 MMOL/L — SIGNIFICANT CHANGE UP (ref 96–108)
CO2 SERPL-SCNC: 30 MMOL/L — SIGNIFICANT CHANGE UP (ref 22–31)
CREAT SERPL-MCNC: 0.62 MG/DL — SIGNIFICANT CHANGE UP (ref 0.5–1.3)
EOSINOPHIL # BLD AUTO: 0.1 K/UL — SIGNIFICANT CHANGE UP (ref 0–0.5)
EOSINOPHIL NFR BLD AUTO: 1.8 % — SIGNIFICANT CHANGE UP (ref 0–6)
GLUCOSE SERPL-MCNC: 93 MG/DL — SIGNIFICANT CHANGE UP (ref 70–99)
HCT VFR BLD CALC: 42.6 % — SIGNIFICANT CHANGE UP (ref 34.5–45)
HGB BLD-MCNC: 15.2 G/DL — SIGNIFICANT CHANGE UP (ref 11.5–15.5)
INR BLD: 1 RATIO — SIGNIFICANT CHANGE UP (ref 0.88–1.16)
LYMPHOCYTES # BLD AUTO: 1.5 K/UL — SIGNIFICANT CHANGE UP (ref 1–3.3)
LYMPHOCYTES # BLD AUTO: 49 % — HIGH (ref 13–44)
MCHC RBC-ENTMCNC: 31 PG — SIGNIFICANT CHANGE UP (ref 27–34)
MCHC RBC-ENTMCNC: 35.7 GM/DL — SIGNIFICANT CHANGE UP (ref 32–36)
MCV RBC AUTO: 86.8 FL — SIGNIFICANT CHANGE UP (ref 80–100)
MONOCYTES # BLD AUTO: 0.2 K/UL — SIGNIFICANT CHANGE UP (ref 0–0.9)
MONOCYTES NFR BLD AUTO: 7.2 % — SIGNIFICANT CHANGE UP (ref 2–14)
NEUTROPHILS # BLD AUTO: 1.2 K/UL — LOW (ref 1.8–7.4)
NEUTROPHILS NFR BLD AUTO: 41.8 % — LOW (ref 43–77)
PLATELET # BLD AUTO: 261 K/UL — SIGNIFICANT CHANGE UP (ref 150–400)
POTASSIUM SERPL-MCNC: 4.9 MMOL/L — SIGNIFICANT CHANGE UP (ref 3.5–5.3)
POTASSIUM SERPL-SCNC: 4.9 MMOL/L — SIGNIFICANT CHANGE UP (ref 3.5–5.3)
PROT SERPL-MCNC: 8.1 G/DL — SIGNIFICANT CHANGE UP (ref 6–8.3)
PROTHROM AB SERPL-ACNC: 11.5 SEC — SIGNIFICANT CHANGE UP (ref 10–12.9)
RBC # BLD: 4.91 M/UL — SIGNIFICANT CHANGE UP (ref 3.8–5.2)
RBC # FLD: 11.4 % — SIGNIFICANT CHANGE UP (ref 10.3–14.5)
SODIUM SERPL-SCNC: 141 MMOL/L — SIGNIFICANT CHANGE UP (ref 135–145)
TROPONIN T, HIGH SENSITIVITY RESULT: <6 NG/L — SIGNIFICANT CHANGE UP (ref 0–51)
WBC # BLD: 3 K/UL — LOW (ref 3.8–10.5)
WBC # FLD AUTO: 3 K/UL — LOW (ref 3.8–10.5)

## 2018-12-06 PROCEDURE — 85027 COMPLETE CBC AUTOMATED: CPT

## 2018-12-06 PROCEDURE — 36415 COLL VENOUS BLD VENIPUNCTURE: CPT

## 2018-12-06 PROCEDURE — 71046 X-RAY EXAM CHEST 2 VIEWS: CPT

## 2018-12-06 PROCEDURE — G0463: CPT

## 2018-12-06 PROCEDURE — 80048 BASIC METABOLIC PNL TOTAL CA: CPT

## 2018-12-06 PROCEDURE — 71046 X-RAY EXAM CHEST 2 VIEWS: CPT | Mod: 26

## 2018-12-06 PROCEDURE — 99284 EMERGENCY DEPT VISIT MOD MDM: CPT | Mod: 25

## 2018-12-06 PROCEDURE — 84439 ASSAY OF FREE THYROXINE: CPT

## 2018-12-06 PROCEDURE — 93010 ELECTROCARDIOGRAM REPORT: CPT

## 2018-12-06 PROCEDURE — 99285 EMERGENCY DEPT VISIT HI MDM: CPT | Mod: 25

## 2018-12-06 PROCEDURE — 80053 COMPREHEN METABOLIC PANEL: CPT

## 2018-12-06 PROCEDURE — 84443 ASSAY THYROID STIM HORMONE: CPT

## 2018-12-06 PROCEDURE — 85610 PROTHROMBIN TIME: CPT

## 2018-12-06 PROCEDURE — 85730 THROMBOPLASTIN TIME PARTIAL: CPT

## 2018-12-06 PROCEDURE — 93005 ELECTROCARDIOGRAM TRACING: CPT

## 2018-12-06 PROCEDURE — 84484 ASSAY OF TROPONIN QUANT: CPT

## 2018-12-06 RX ORDER — ASPIRIN/CALCIUM CARB/MAGNESIUM 324 MG
324 TABLET ORAL ONCE
Qty: 0 | Refills: 0 | Status: COMPLETED | OUTPATIENT
Start: 2018-12-06 | End: 2018-12-06

## 2018-12-06 RX ADMIN — Medication 324 MILLIGRAM(S): at 13:11

## 2018-12-06 NOTE — ED PROVIDER NOTE - OBJECTIVE STATEMENT
57 yo F c PMH of hypothyroidism p/w 1 week hx of intermittent L chest pressure radiating to L arm, non-exertional, non-positional, non-pleuritic. has been on and off for past week, constant for past 3 hours, moderate to mild, saw endocrinologist today who said to come to ED. no hx of sx, no stress test or echo. no hx of dvt or pe, no leg swelling or recent travel or surgery    ROS positive: CP  ROS negative: f/c, cough, hemoptysis, abdominal pain, vomiting, dysuria, hematuria, leg edema, focal numbness or weakness

## 2018-12-06 NOTE — ED PROVIDER NOTE - PROGRESS NOTE DETAILS
Laisha DRIVER: troponin and ekg wnl will heart score 2 will d/c with cards and pmd f/u Laisha DRIVER: pt denies current CP appears in nad troponin and ekg wnl will heart score 2 will d/c with cards and pmd f/u spoke with pts pmd clinic who said they can see her in the next 2 days

## 2018-12-06 NOTE — ED PROVIDER NOTE - PLAN OF CARE
1. You were seen for chest pain. A copy of your resulted labs, imaging, and findings have been provided to you.  2. Continue to take your home medications as prescribed.   3. Follow up with a cardiologist and your primary care doctor within 48 hours. Please call [phone] to make an appointment or with any questions you may have.  4. Return immediately to the emergency department for new, persistent, or worsening symptoms or signs. Return immediately to the emergency department if you have chest pain, shortness of breath, loss of consciousness, fever, or vomiting.

## 2018-12-06 NOTE — ED PROVIDER NOTE - MEDICAL DECISION MAKING DETAILS
melecio - pt with nonexertional cp and l arm pain since yesterday , cp at reat no sig risk factors - chk ekg, asa, trop and reeval

## 2018-12-06 NOTE — ED ADULT NURSE NOTE - OBJECTIVE STATEMENT
pt 59yo female with left sided chest pain onset yesterday presnets to er from endocrinolgy clinic after visit pat alert oriennted sinus bradycardia rate 58 placed on portable cardiac mionitering exam by md with labs sent as ordered motor sensory intact to all extremities

## 2018-12-06 NOTE — ED PROVIDER NOTE - CARE PLAN
Principal Discharge DX:	Chest pain at rest Principal Discharge DX:	Chest pain at rest  Assessment and plan of treatment:	1. You were seen for chest pain. A copy of your resulted labs, imaging, and findings have been provided to you.  2. Continue to take your home medications as prescribed.   3. Follow up with a cardiologist and your primary care doctor within 48 hours. Please call [phone] to make an appointment or with any questions you may have.  4. Return immediately to the emergency department for new, persistent, or worsening symptoms or signs. Return immediately to the emergency department if you have chest pain, shortness of breath, loss of consciousness, fever, or vomiting.

## 2018-12-11 ENCOUNTER — APPOINTMENT (OUTPATIENT)
Dept: FAMILY MEDICINE | Facility: HOSPITAL | Age: 58
End: 2018-12-11

## 2018-12-12 ENCOUNTER — OUTPATIENT (OUTPATIENT)
Dept: OUTPATIENT SERVICES | Facility: HOSPITAL | Age: 58
LOS: 1 days | End: 2018-12-12
Payer: SELF-PAY

## 2018-12-12 ENCOUNTER — NON-APPOINTMENT (OUTPATIENT)
Age: 58
End: 2018-12-12

## 2018-12-12 ENCOUNTER — APPOINTMENT (OUTPATIENT)
Dept: CARDIOLOGY | Facility: HOSPITAL | Age: 58
End: 2018-12-12

## 2018-12-12 VITALS
SYSTOLIC BLOOD PRESSURE: 113 MMHG | OXYGEN SATURATION: 99 % | HEART RATE: 74 BPM | HEIGHT: 60 IN | DIASTOLIC BLOOD PRESSURE: 69 MMHG | WEIGHT: 123 LBS | BODY MASS INDEX: 24.15 KG/M2

## 2018-12-12 DIAGNOSIS — I25.10 ATHEROSCLEROTIC HEART DISEASE OF NATIVE CORONARY ARTERY WITHOUT ANGINA PECTORIS: ICD-10-CM

## 2018-12-12 PROCEDURE — G0463: CPT

## 2018-12-12 PROCEDURE — 93005 ELECTROCARDIOGRAM TRACING: CPT

## 2018-12-16 ENCOUNTER — FORM ENCOUNTER (OUTPATIENT)
Age: 58
End: 2018-12-16

## 2018-12-17 ENCOUNTER — APPOINTMENT (OUTPATIENT)
Dept: MRI IMAGING | Facility: HOSPITAL | Age: 58
End: 2018-12-17
Payer: COMMERCIAL

## 2018-12-17 ENCOUNTER — OUTPATIENT (OUTPATIENT)
Dept: OUTPATIENT SERVICES | Facility: HOSPITAL | Age: 58
LOS: 1 days | End: 2018-12-17
Payer: SELF-PAY

## 2018-12-17 DIAGNOSIS — D44.3 NEOPLASM OF UNCERTAIN BEHAVIOR OF PITUITARY GLAND: ICD-10-CM

## 2018-12-17 PROCEDURE — 70553 MRI BRAIN STEM W/O & W/DYE: CPT | Mod: 26

## 2018-12-17 PROCEDURE — A9579: CPT

## 2018-12-17 PROCEDURE — 70553 MRI BRAIN STEM W/O & W/DYE: CPT

## 2018-12-19 NOTE — PHYSICAL EXAM
[General Appearance - Well Developed] : well developed [General Appearance - Well Nourished] : well nourished [Normal Conjunctiva] : the conjunctiva exhibited no abnormalities [Normal Oral Mucosa] : normal oral mucosa [Normal Oropharynx] : normal oropharynx [] : no respiratory distress [Respiration, Rhythm And Depth] : normal respiratory rhythm and effort [Heart Sounds] : normal S1 and S2 [Bowel Sounds] : normal bowel sounds [Nail Clubbing] : no clubbing of the fingernails [Cyanosis, Localized] : no localized cyanosis

## 2018-12-19 NOTE — DISCUSSION/SUMMARY
[FreeTextEntry1] : 58 year old woman with HLD presenting with atypical chest pain. She is intermediate risk given age, gender.\par -given intermediate risk, would opt for exercise stress test \par -c/w atorvastatin \par -start aspirin 81 mg daily\par -follow up to clinic in 1 month \par \par \par

## 2018-12-19 NOTE — REASON FOR VISIT
[Follow-Up - Clinic] : a clinic follow-up of [FreeTextEntry1] : Peggy Guevara is a 58 year old woman with HLD who presents as a new visit. She stats that she has had on and off chest pain for the past few weeks but this past Tuesday, noticed a a few minutes of chest pain, substernal, with radiation to her left arm. This occurred without exertion while at home, but also occurred again later in the week. This resulted in her going to the hospital, with the patient with negative enzymes and work up. She has had no further episodes since then.

## 2018-12-20 ENCOUNTER — OUTPATIENT (OUTPATIENT)
Dept: OUTPATIENT SERVICES | Facility: HOSPITAL | Age: 58
LOS: 1 days | End: 2018-12-20
Payer: SELF-PAY

## 2018-12-20 ENCOUNTER — APPOINTMENT (OUTPATIENT)
Dept: CV DIAGNOSTICS | Facility: HOSPITAL | Age: 58
End: 2018-12-20

## 2018-12-20 DIAGNOSIS — R07.9 CHEST PAIN, UNSPECIFIED: ICD-10-CM

## 2018-12-20 DIAGNOSIS — I25.10 ATHEROSCLEROTIC HEART DISEASE OF NATIVE CORONARY ARTERY WITHOUT ANGINA PECTORIS: ICD-10-CM

## 2018-12-20 DIAGNOSIS — I10 ESSENTIAL (PRIMARY) HYPERTENSION: ICD-10-CM

## 2018-12-20 PROCEDURE — 93018 CV STRESS TEST I&R ONLY: CPT

## 2018-12-20 PROCEDURE — 93017 CV STRESS TEST TRACING ONLY: CPT

## 2018-12-20 PROCEDURE — 93016 CV STRESS TEST SUPVJ ONLY: CPT

## 2018-12-20 PROCEDURE — A9500: CPT

## 2018-12-20 PROCEDURE — 78452 HT MUSCLE IMAGE SPECT MULT: CPT

## 2018-12-20 PROCEDURE — 78452 HT MUSCLE IMAGE SPECT MULT: CPT | Mod: 26

## 2018-12-21 ENCOUNTER — RESULT REVIEW (OUTPATIENT)
Age: 58
End: 2018-12-21

## 2018-12-26 ENCOUNTER — OUTPATIENT (OUTPATIENT)
Dept: OUTPATIENT SERVICES | Facility: HOSPITAL | Age: 58
LOS: 1 days | End: 2018-12-26
Payer: SELF-PAY

## 2018-12-26 ENCOUNTER — APPOINTMENT (OUTPATIENT)
Dept: FAMILY MEDICINE | Facility: HOSPITAL | Age: 58
End: 2018-12-26

## 2018-12-26 ENCOUNTER — MED ADMIN CHARGE (OUTPATIENT)
Age: 58
End: 2018-12-26

## 2018-12-26 VITALS
WEIGHT: 124 LBS | OXYGEN SATURATION: 100 % | RESPIRATION RATE: 14 BRPM | DIASTOLIC BLOOD PRESSURE: 74 MMHG | BODY MASS INDEX: 24.22 KG/M2 | HEART RATE: 77 BPM | TEMPERATURE: 98 F | SYSTOLIC BLOOD PRESSURE: 121 MMHG

## 2018-12-26 DIAGNOSIS — Z00.00 ENCOUNTER FOR GENERAL ADULT MEDICAL EXAMINATION WITHOUT ABNORMAL FINDINGS: ICD-10-CM

## 2018-12-26 PROCEDURE — G0008: CPT

## 2018-12-26 PROCEDURE — G0463: CPT

## 2018-12-26 RX ORDER — UBIDECARENONE/VIT E ACET 100MG-5
50 MCG CAPSULE ORAL
Qty: 60 | Refills: 3 | Status: ACTIVE | COMMUNITY
Start: 2018-02-14 | End: 1900-01-01

## 2018-12-26 NOTE — HISTORY OF PRESENT ILLNESS
[FreeTextEntry1] : Follow-up chest pain [de-identified] : Patient was previously seen by Endocrinology, December 6th, referred to ED due to chest pain at that time. Today, patient is without active symptoms. Reports several weeks of chest pain, achy in quality, gradually improving. Previously noted to radiate to the left arm. ED work-up was negative for acute ischemia. Patient followed up with Dr. Teran (cardio) and nuclear stress test was performed which showed "a small,  mild defect in the apex that is mostly fixed suggestive of  scar with minimal ischemia" and "mildly reduced  systolic thickening of the apex with normal overall left ventricular ejection fraction." She has follow-up with cardiology next week and has concerns about nausea and stomach discomfort with her atorvastatin. \par \par In addition, patient has intermittent episodes of mid-thoracic back pain ongoing for several months. Achy in quality, improved with methocarbamol which was previously prescribed. Worse with certain movements. She does not take methocarbamol every day.

## 2018-12-26 NOTE — PHYSICAL EXAM
[No Acute Distress] : no acute distress [Well Nourished] : well nourished [Normal Sclera/Conjunctiva] : normal sclera/conjunctiva [EOMI] : extraocular movements intact [Normal Outer Ear/Nose] : the outer ears and nose were normal in appearance [Normal Oropharynx] : the oropharynx was normal [No Respiratory Distress] : no respiratory distress  [Clear to Auscultation] : lungs were clear to auscultation bilaterally [No Accessory Muscle Use] : no accessory muscle use [Normal Rate] : normal rate  [Regular Rhythm] : with a regular rhythm [Normal S1, S2] : normal S1 and S2 [No Murmur] : no murmur heard [Pedal Pulses Present] : the pedal pulses are present [No Edema] : there was no peripheral edema [Soft] : abdomen soft [Non Tender] : non-tender [No CVA Tenderness] : no CVA  tenderness [No Spinal Tenderness] : no spinal tenderness [Kyphosis] : no kyphosis [Scoliosis] : no scoliosis [No Rash] : no rash [Normal Gait] : normal gait [No Focal Deficits] : no focal deficits [Normal Affect] : the affect was normal [Normal Insight/Judgement] : insight and judgment were intact

## 2018-12-26 NOTE — ASSESSMENT
[FreeTextEntry1] : #Atypical chest pain\par -Continue atrovastatin\par -Follow-up with cardiology as scheduled\par -RTC 3 months or sooner if development of new or worsening symptoms.\par -Call EMS or clinic immediately if chest pain develops. \par \par #Acute on chronic midline back pain\par -Tylenol 650 mg q6H PRN\par -Methocarbamol q8H PRN. Counseled to avoid driving or operating heavy machinery with this medication\par -Consider further imaging/ PT if patient has persistent pain\par

## 2018-12-27 DIAGNOSIS — Z23 ENCOUNTER FOR IMMUNIZATION: ICD-10-CM

## 2018-12-27 DIAGNOSIS — R07.89 OTHER CHEST PAIN: ICD-10-CM

## 2019-01-02 ENCOUNTER — APPOINTMENT (OUTPATIENT)
Dept: CARDIOLOGY | Facility: HOSPITAL | Age: 59
End: 2019-01-02

## 2019-01-02 ENCOUNTER — OUTPATIENT (OUTPATIENT)
Dept: OUTPATIENT SERVICES | Facility: HOSPITAL | Age: 59
LOS: 1 days | End: 2019-01-02
Payer: SELF-PAY

## 2019-01-02 VITALS
OXYGEN SATURATION: 99 % | HEART RATE: 85 BPM | WEIGHT: 123 LBS | HEIGHT: 60 IN | SYSTOLIC BLOOD PRESSURE: 107 MMHG | DIASTOLIC BLOOD PRESSURE: 73 MMHG | BODY MASS INDEX: 24.15 KG/M2

## 2019-01-02 DIAGNOSIS — I25.10 ATHEROSCLEROTIC HEART DISEASE OF NATIVE CORONARY ARTERY WITHOUT ANGINA PECTORIS: ICD-10-CM

## 2019-01-02 PROCEDURE — G0463: CPT

## 2019-01-02 NOTE — PHYSICAL EXAM
[General Appearance - Well Developed] : well developed [Normal Conjunctiva] : the conjunctiva exhibited no abnormalities [Normal Oral Mucosa] : normal oral mucosa [Normal Jugular Venous V Waves Present] : normal jugular venous V waves present [] : no respiratory distress [Respiration, Rhythm And Depth] : normal respiratory rhythm and effort [Heart Sounds] : normal S1 and S2 [Bowel Sounds] : normal bowel sounds [Cyanosis, Localized] : no localized cyanosis [Skin Color & Pigmentation] : normal skin color and pigmentation [Skin Turgor] : normal skin turgor

## 2019-01-03 NOTE — DISCUSSION/SUMMARY
[FreeTextEntry1] : 58 year old woman with HLD who presented with chest pain. Her nuclear stress test is reassuring as she is extremely low risk for any cardiac event in the next year with 10 mets of exercise. Additionally, her apical disease is likely due to small vessel atherosclerosis without any big vessel involvement, given her normal EF without any other Wall motion. Lipid panel TG of 249 and cholesterol of 201.\par -at this point, plan is for aggressive medical management without any plan for other invasive testing\par -continue aspirin\par -c/w atorvastatin- discussed with patient that we should uptitrated but she would prefer to stay at 20 mg as she sometimes has n/v with this medication. \par -will obtain new lipid panel \par -educated patient on red flags that should prompt ER visit. \par -follow up to clinic in 4 months

## 2019-01-03 NOTE — REASON FOR VISIT
[Follow-Up - Clinic] : a clinic follow-up of [FreeTextEntry1] : Peggy Guevara is a 58 year old woman with HLD who presented last month for a first time visit. She stats that she has had on and off chest pain for the past few weeks but that past week had a few minutes of chest pain, substernal, with radiation to her left arm. This occurred without exertion while at home, but also occurred again later in the week. This resulted in her going to the hospital, with the patient with negative enzymes and work up. She has had no further episodes since then. Given her risk factors I sent her for a exercise stress test. She accomplished 10 mets, excellent for age and gender but also had a small, mild apical fixed defect, not reversible. \par \par She was started on a statin, aspirin, and is doing well without any further symptoms.

## 2019-03-28 LAB
ALBUMIN SERPL ELPH-MCNC: 4.2 G/DL
ALP BLD-CCNC: 65 U/L
ALT SERPL-CCNC: 14 U/L
ANION GAP SERPL CALC-SCNC: 11 MMOL/L
AST SERPL-CCNC: 22 U/L
BILIRUB DIRECT SERPL-MCNC: 0.1 MG/DL
BILIRUB INDIRECT SERPL-MCNC: 0.3 MG/DL
BILIRUB SERPL-MCNC: 0.4 MG/DL
BUN SERPL-MCNC: 13 MG/DL
CALCIUM SERPL-MCNC: 9.5 MG/DL
CHLORIDE SERPL-SCNC: 105 MMOL/L
CHOLEST SERPL-MCNC: 179 MG/DL
CHOLEST/HDLC SERPL: 5.3 RATIO
CO2 SERPL-SCNC: 27 MMOL/L
CREAT SERPL-MCNC: 0.58 MG/DL
GLUCOSE SERPL-MCNC: 95 MG/DL
HDLC SERPL-MCNC: 34 MG/DL
LDLC SERPL CALC-MCNC: 92 MG/DL
POTASSIUM SERPL-SCNC: 4.2 MMOL/L
PROT SERPL-MCNC: 7.1 G/DL
SODIUM SERPL-SCNC: 143 MMOL/L
TRIGL SERPL-MCNC: 265 MG/DL

## 2019-04-03 ENCOUNTER — OUTPATIENT (OUTPATIENT)
Dept: OUTPATIENT SERVICES | Facility: HOSPITAL | Age: 59
LOS: 1 days | End: 2019-04-03
Payer: SELF-PAY

## 2019-04-03 ENCOUNTER — NON-APPOINTMENT (OUTPATIENT)
Age: 59
End: 2019-04-03

## 2019-04-03 ENCOUNTER — APPOINTMENT (OUTPATIENT)
Dept: CARDIOLOGY | Facility: HOSPITAL | Age: 59
End: 2019-04-03

## 2019-04-03 VITALS
WEIGHT: 123 LBS | DIASTOLIC BLOOD PRESSURE: 77 MMHG | SYSTOLIC BLOOD PRESSURE: 120 MMHG | HEART RATE: 81 BPM | OXYGEN SATURATION: 100 % | BODY MASS INDEX: 24.02 KG/M2

## 2019-04-03 DIAGNOSIS — I25.10 ATHEROSCLEROTIC HEART DISEASE OF NATIVE CORONARY ARTERY WITHOUT ANGINA PECTORIS: ICD-10-CM

## 2019-04-03 PROCEDURE — 93005 ELECTROCARDIOGRAM TRACING: CPT

## 2019-04-03 PROCEDURE — G0463: CPT

## 2019-04-03 RX ORDER — ATORVASTATIN CALCIUM 20 MG/1
20 TABLET, FILM COATED ORAL
Qty: 2 | Refills: 0 | Status: DISCONTINUED | COMMUNITY
Start: 2018-12-13 | End: 2019-04-03

## 2019-04-05 NOTE — DISCUSSION/SUMMARY
[FreeTextEntry1] : 58 year old woman with HLD who presented with chest pain. Her nuclear stress test is reassuring as she is extremely low risk for any cardiac event in the next year with 10 mets of exercise. Additionally, her apical disease is likely due to small vessel atherosclerosis without any big vessel involvement, given her normal EF without any other Wall motion. Lipid panel TG of 249 and cholesterol of 201.\par -at this point, plan is for aggressive medical management without any plan for other invasive testing\par -continue aspirin\par -switched to simvastatin 20 mg. Admit high intensity preferred but given tolerance issues, will stay here\par -will obtain new lipid panel \par -educated patient on red flags that should prompt ER visit. \par -follow up to clinic in 4 months. \par

## 2019-04-05 NOTE — PHYSICAL EXAM
[General Appearance - Well Developed] : well developed [General Appearance - Well Nourished] : well nourished [Normal Conjunctiva] : the conjunctiva exhibited no abnormalities [Normal Oropharynx] : normal oropharynx [Normal Jugular Venous A Waves Present] : normal jugular venous A waves present [Normal Jugular Venous V Waves Present] : normal jugular venous V waves present [] : no respiratory distress [Respiration, Rhythm And Depth] : normal respiratory rhythm and effort [Heart Rate And Rhythm] : heart rate and rhythm were normal [Heart Sounds] : normal S1 and S2 [Bowel Sounds] : normal bowel sounds [Abdomen Soft] : soft [Abnormal Walk] : normal gait [Nail Clubbing] : no clubbing of the fingernails [Skin Color & Pigmentation] : normal skin color and pigmentation [Oriented To Time, Place, And Person] : oriented to person, place, and time

## 2019-04-05 NOTE — REASON FOR VISIT
[FreeTextEntry1] : Peggy Guevara is a 58 year old woman with HLD who presented last month for a first time visit. She recently underwent an exercise stress test. She accomplished 10 mets, excellent for age and gender but also had a small, mild apical fixed defect, not reversible. Since then, she overall had had no episodes of chest pain but admits that last week, while at rest, she felt some pain over her lateral chest wall. She denies any SOB, palpitations, PND, orthopnea, etc at that time. \par \par She was started on a statin, aspirin, and is doing well without any further symptoms. However, she states that she has self dc'ed atorvastin because of headaches. She was on simvastatin previously.

## 2019-04-22 ENCOUNTER — INPATIENT (INPATIENT)
Facility: HOSPITAL | Age: 59
LOS: 0 days | Discharge: ANOTHER HOSPITAL | DRG: 313 | End: 2019-04-23
Attending: FAMILY MEDICINE | Admitting: FAMILY MEDICINE
Payer: MEDICAID

## 2019-04-22 VITALS
WEIGHT: 126.1 LBS | TEMPERATURE: 97 F | DIASTOLIC BLOOD PRESSURE: 86 MMHG | SYSTOLIC BLOOD PRESSURE: 132 MMHG | RESPIRATION RATE: 18 BRPM | OXYGEN SATURATION: 100 % | HEART RATE: 79 BPM

## 2019-04-22 DIAGNOSIS — R42 DIZZINESS AND GIDDINESS: ICD-10-CM

## 2019-04-22 DIAGNOSIS — R07.89 OTHER CHEST PAIN: ICD-10-CM

## 2019-04-22 DIAGNOSIS — R07.9 CHEST PAIN, UNSPECIFIED: ICD-10-CM

## 2019-04-22 DIAGNOSIS — Z29.9 ENCOUNTER FOR PROPHYLACTIC MEASURES, UNSPECIFIED: ICD-10-CM

## 2019-04-22 DIAGNOSIS — E78.00 PURE HYPERCHOLESTEROLEMIA, UNSPECIFIED: ICD-10-CM

## 2019-04-22 LAB
ALBUMIN SERPL ELPH-MCNC: 3.8 G/DL — SIGNIFICANT CHANGE UP (ref 3.3–5)
ALP SERPL-CCNC: 79 U/L — SIGNIFICANT CHANGE UP (ref 40–120)
ALT FLD-CCNC: 23 U/L DA — SIGNIFICANT CHANGE UP (ref 10–45)
ANION GAP SERPL CALC-SCNC: 11 MMOL/L — SIGNIFICANT CHANGE UP (ref 5–17)
AST SERPL-CCNC: 21 U/L — SIGNIFICANT CHANGE UP (ref 10–40)
BILIRUB SERPL-MCNC: 0.6 MG/DL — SIGNIFICANT CHANGE UP (ref 0.2–1.2)
BUN SERPL-MCNC: 15 MG/DL — SIGNIFICANT CHANGE UP (ref 7–23)
CALCIUM SERPL-MCNC: 9.7 MG/DL — SIGNIFICANT CHANGE UP (ref 8.4–10.5)
CHLORIDE SERPL-SCNC: 104 MMOL/L — SIGNIFICANT CHANGE UP (ref 96–108)
CO2 SERPL-SCNC: 26 MMOL/L — SIGNIFICANT CHANGE UP (ref 22–31)
CREAT SERPL-MCNC: 0.64 MG/DL — SIGNIFICANT CHANGE UP (ref 0.5–1.3)
GLUCOSE SERPL-MCNC: 91 MG/DL — SIGNIFICANT CHANGE UP (ref 70–99)
HCT VFR BLD CALC: 42.7 % — SIGNIFICANT CHANGE UP (ref 34.5–45)
HGB BLD-MCNC: 14 G/DL — SIGNIFICANT CHANGE UP (ref 11.5–15.5)
MCHC RBC-ENTMCNC: 29.4 PG — SIGNIFICANT CHANGE UP (ref 27–34)
MCHC RBC-ENTMCNC: 32.8 GM/DL — SIGNIFICANT CHANGE UP (ref 32–36)
MCV RBC AUTO: 89.5 FL — SIGNIFICANT CHANGE UP (ref 80–100)
PLATELET # BLD AUTO: 276 K/UL — SIGNIFICANT CHANGE UP (ref 150–400)
POTASSIUM SERPL-MCNC: 3.8 MMOL/L — SIGNIFICANT CHANGE UP (ref 3.5–5.3)
POTASSIUM SERPL-SCNC: 3.8 MMOL/L — SIGNIFICANT CHANGE UP (ref 3.5–5.3)
PROT SERPL-MCNC: 7.6 G/DL — SIGNIFICANT CHANGE UP (ref 6–8.3)
RBC # BLD: 4.77 M/UL — SIGNIFICANT CHANGE UP (ref 3.8–5.2)
RBC # FLD: 11.7 % — SIGNIFICANT CHANGE UP (ref 10.3–14.5)
SODIUM SERPL-SCNC: 141 MMOL/L — SIGNIFICANT CHANGE UP (ref 135–145)
TROPONIN I SERPL-MCNC: <.017 NG/ML — LOW (ref 0.02–0.06)
WBC # BLD: 3.2 K/UL — LOW (ref 3.8–10.5)
WBC # FLD AUTO: 3.2 K/UL — LOW (ref 3.8–10.5)

## 2019-04-22 PROCEDURE — 93010 ELECTROCARDIOGRAM REPORT: CPT

## 2019-04-22 PROCEDURE — 99222 1ST HOSP IP/OBS MODERATE 55: CPT | Mod: GC

## 2019-04-22 PROCEDURE — 71045 X-RAY EXAM CHEST 1 VIEW: CPT | Mod: 26

## 2019-04-22 PROCEDURE — 99285 EMERGENCY DEPT VISIT HI MDM: CPT

## 2019-04-22 PROCEDURE — 99222 1ST HOSP IP/OBS MODERATE 55: CPT

## 2019-04-22 RX ORDER — HEPARIN SODIUM 5000 [USP'U]/ML
5000 INJECTION INTRAVENOUS; SUBCUTANEOUS EVERY 8 HOURS
Qty: 0 | Refills: 0 | Status: DISCONTINUED | OUTPATIENT
Start: 2019-04-22 | End: 2019-04-23

## 2019-04-22 RX ORDER — SODIUM CHLORIDE 9 MG/ML
1000 INJECTION INTRAMUSCULAR; INTRAVENOUS; SUBCUTANEOUS ONCE
Qty: 0 | Refills: 0 | Status: COMPLETED | OUTPATIENT
Start: 2019-04-22 | End: 2019-04-22

## 2019-04-22 RX ORDER — ASPIRIN/CALCIUM CARB/MAGNESIUM 324 MG
81 TABLET ORAL DAILY
Qty: 0 | Refills: 0 | Status: DISCONTINUED | OUTPATIENT
Start: 2019-04-22 | End: 2019-04-23

## 2019-04-22 RX ORDER — ATORVASTATIN CALCIUM 80 MG/1
10 TABLET, FILM COATED ORAL AT BEDTIME
Qty: 0 | Refills: 0 | Status: DISCONTINUED | OUTPATIENT
Start: 2019-04-22 | End: 2019-04-23

## 2019-04-22 RX ORDER — SIMVASTATIN 20 MG/1
20 TABLET, FILM COATED ORAL AT BEDTIME
Qty: 0 | Refills: 0 | Status: DISCONTINUED | OUTPATIENT
Start: 2019-04-22 | End: 2019-04-22

## 2019-04-22 RX ADMIN — ATORVASTATIN CALCIUM 10 MILLIGRAM(S): 80 TABLET, FILM COATED ORAL at 21:30

## 2019-04-22 RX ADMIN — SODIUM CHLORIDE 2000 MILLILITER(S): 9 INJECTION INTRAMUSCULAR; INTRAVENOUS; SUBCUTANEOUS at 12:38

## 2019-04-22 RX ADMIN — HEPARIN SODIUM 5000 UNIT(S): 5000 INJECTION INTRAVENOUS; SUBCUTANEOUS at 16:23

## 2019-04-22 RX ADMIN — HEPARIN SODIUM 5000 UNIT(S): 5000 INJECTION INTRAVENOUS; SUBCUTANEOUS at 21:30

## 2019-04-22 RX ADMIN — SODIUM CHLORIDE 1000 MILLILITER(S): 9 INJECTION INTRAMUSCULAR; INTRAVENOUS; SUBCUTANEOUS at 13:49

## 2019-04-22 NOTE — H&P ADULT - PROBLEM SELECTOR PLAN 2
- Etiology unclear  - BPPV vs orthostatic hypotension vs cardiac arrythmia  - [place]-Hallpike test in ED unsuccessful  - F/U orthostatics

## 2019-04-22 NOTE — ED ADULT NURSE NOTE - NSIMPLEMENTINTERV_GEN_ALL_ED
Implemented All Universal Safety Interventions:  [place] to call system. Call bell, personal items and telephone within reach. Instruct patient to call for assistance. Room bathroom lighting operational. Non-slip footwear when patient is off stretcher. Physically safe environment: no spills, clutter or unnecessary equipment. Stretcher in lowest position, wheels locked, appropriate side rails in place.

## 2019-04-22 NOTE — H&P ADULT - HISTORY OF PRESENT ILLNESS
58F w/PMH of HLD, hypothyroidism presents w/ a 2 day hx of chest pressure and dizziness. Pt reports yesterday evening while cleaning a table, she began to experience worsening substernal chest pressure radiating to L arm associated w/ nausea. This pressure was made somewhat better after resting. On AM of day of admission, pt awoke w/ this chest pressure and new onset dizziness as well. Pt reports the chest pressure was similar to an event back in December, but the dizziness was new. Dizziness waxing/waning, began when she arose from bed. Denies recent illnesses, fever/chills, vomiting, diarrhea, cough.     In the ED, Trop neg x1, EKG NSR, no ST changes. Pt noted to have brief episode of hypotension and was given 1L NS bolus. SBP returned to 130s.

## 2019-04-22 NOTE — H&P ADULT - PROBLEM SELECTOR PLAN 3
- F/U lipid panel  - Continue home simvastatin 20mg qhs (pt did not tolerate higher dose) - Lipid panel drawn last month significant for LDL WNL, elevated TriG, low HDL  - Continue home simvastatin 20mg qhs (pt did not tolerate higher dose)

## 2019-04-22 NOTE — ED ADULT NURSE REASSESSMENT NOTE - NS ED NURSE REASSESS COMMENT FT1
Patient states that chest pain is still present and she feels dizzy still, MD Colletta notified IVF was ordered and administered

## 2019-04-22 NOTE — H&P ADULT - PROBLEM SELECTOR PLAN 1
- Admit to tele for monitoring  - On admission, Trop neg x1, EKG NSR, no ST changes  - Continue home simvastatin 20mg qhs (pt did not tolerate higher dose)  - Start ASA 81mg qd  - Cardiology consult  - F/U Trop x2  - F/U echo

## 2019-04-22 NOTE — CONSULT NOTE ADULT - SUBJECTIVE AND OBJECTIVE BOX
Chief Complaint:  CP/DIZZINESS    HPI: this is a 58 yr old woman who was in her usual state of good health until yesterday morning when she began to feel dizzy. She states the dizziness was worse when she was walking and not necessarily when she changed positions. SHe also had some mild aching like chest pain early this morning for 4 hours without n/v diaphoresis or sob.  now she state she has some pain in her arm    PMH:   Hypothyroid  Hypercholesterolemia    PSH:   No significant past surgical history    Family History:  FAMILY HISTORY:  No pertinent family history in first degree relatives      Social History:  Smoking:no  Alcohol:no  Drugs:no    Allergies:  morphine (Unknown)  OxyContin (Unknown)      Medications:  aspirin enteric coated 81 milliGRAM(s) Oral daily  atorvastatin 10 milliGRAM(s) Oral at bedtime  heparin  Injectable 5000 Unit(s) SubCutaneous every 8 hours      REVIEW OF SYSTEMS:  CONSTITUTIONAL: No fever, weight loss, or fatigue  EYES: No eye pain, visual disturbances, or discharge  ENMT:  No difficulty hearing, tinnitus, vertigo; No sinus or throat pain  NECK: No pain or stiffness  BREASTS: No pain, masses, or nipple discharge  RESPIRATORY: No cough, wheezing, chills or hemoptysis; No shortness of breath  CARDIOVASCULAR: No chest pain, palpitations, dizziness, or leg swelling  GASTROINTESTINAL: No abdominal or epigastric pain. No nausea, vomiting, or hematemesis; No diarrhea or constipation. No melena or hematochezia.  GENITOURINARY: No dysuria, frequency, hematuria, or incontinence  NEUROLOGICAL: No headaches, memory loss, loss of strength, numbness, or tremors  SKIN: No itching, burning, rashes, or lesions   LYMPH NODES: No enlarged glands  ENDOCRINE: No heat or cold intolerance; No hair loss  MUSCULOSKELETAL: No joint pain or swelling; No muscle, back, or extremity pain  PSYCHIATRIC: No depression, anxiety, mood swings, or difficulty sleeping  HEME/LYMPH: No easy bruising, or bleeding gums  ALLERY AND IMMUNOLOGIC: No hives or eczema    Physical Exam:  T(C): 36.3 (04-22-19 @ 11:52), Max: 36.3 (04-22-19 @ 09:30)  HR: 72 (04-22-19 @ 15:04) (70 - 79)  BP: 99/59 (04-22-19 @ 15:04) (97/61 - 132/86)  RR: 18 (04-22-19 @ 15:04) (18 - 18)  SpO2: 98% (04-22-19 @ 15:04) (97% - 100%)  Wt(kg): --    GENERAL: NAD, well-groomed, well-developed  HEAD:  Atraumatic, Normocephalic  EYES: EOMI, conjunctiva and sclera clear  ENT: Moist mucous membranes,  NECK: Supple, No JVD, no bruits  CHEST/LUNG: Clear to percussion bilaterally; No rales, rhonchi, wheezing, or rubs  HEART: Regular rate and rhythm; No murmurs, rubs, or gallops PMI non displaced.  ABDOMEN: Soft, Nontender, Nondistended; Bowel sounds present  EXTREMITIES:  2+ Peripheral Pulses, No clubbing, cyanosis, or edema  SKIN: No rashes or lesions  NERVOUS SYSTEM:  Alert & Oriented X3, Good concentration; Motor Strength 5/5 B/L upper and lower extremities; DTRs 2+ intact and symmetric    Cardiovascular Diagnostic Testing:  ECG: sinus rhythm rightward axis unchanged from prior    Labs:                        14.0   3.2   )-----------( 276      ( 22 Apr 2019 09:45 )             42.7     04-22    141  |  104  |  15  ----------------------------<  91  3.8   |  26  |  0.64    Ca    9.7      22 Apr 2019 09:45    TPro  7.6  /  Alb  3.8  /  TBili  0.6  /  DBili  x   /  AST  21  /  ALT  23  /  AlkPhos  79  04-22      CARDIAC MARKERS ( 22 Apr 2019 09:45 )  <.017 ng/mL / x     / x     / x     / x                    Imaging:

## 2019-04-22 NOTE — ED PROVIDER NOTE - CLINICAL SUMMARY MEDICAL DECISION MAKING FREE TEXT BOX
Patient with recent stress test noting a fixed lesion . Patient developed chest pain /dizziness . BP dropped in ed responding to fluids .   R/ O coronary syndrome . Admission

## 2019-04-22 NOTE — H&P ADULT - PROBLEM SELECTOR PLAN 4
- DVT ppx : heparin subq    IMPROVE VTE Individual Risk Assessment  RISK                                                                Points  [  ] Previous VTE                                                  3  [  ] Thrombophilia                                               2  [  ] Lower limb paralysis                                      2        (unable to hold up >15 seconds)    [  ] Current Cancer                                              2         (within 6 months)  [ 1] Immobilization > 24 hrs                                1  [  ] ICU/CCU stay > 24 hours                              1  [1] Age > 60                                                      1    IMPROVE VTE Score 2    IMPROVE Score 0-1: Low Risk, No VTE prophylaxis required for most patients, encourage ambulation.   IMPROVE Score 2-3: At risk, pharmacologic VTE prophylaxis is indicated for most patients (in the absence of a contraindication)  IMPROVE Score > or = 4: High Risk, pharmacologic VTE prophylaxis is indicated for most patients (in the absence of a contraindication)

## 2019-04-22 NOTE — CONSULT NOTE ADULT - ASSESSMENT
In summary the patient is a 58 yr old woman with one known risk factor for heart disease and a mildly abnormal stress test several month ago with cp syndrome and dizziness.    Suggest f/u ekg and serial troponins  depending on clinical course further w/u may be necessary

## 2019-04-23 ENCOUNTER — TRANSCRIPTION ENCOUNTER (OUTPATIENT)
Age: 59
End: 2019-04-23

## 2019-04-23 ENCOUNTER — INPATIENT (INPATIENT)
Facility: HOSPITAL | Age: 59
LOS: 0 days | Discharge: HOME | DRG: 287 | End: 2019-04-23
Attending: INTERNAL MEDICINE | Admitting: INTERNAL MEDICINE
Payer: MEDICAID

## 2019-04-23 VITALS
HEART RATE: 78 BPM | OXYGEN SATURATION: 94 % | SYSTOLIC BLOOD PRESSURE: 102 MMHG | RESPIRATION RATE: 18 BRPM | TEMPERATURE: 98 F | DIASTOLIC BLOOD PRESSURE: 69 MMHG

## 2019-04-23 VITALS
OXYGEN SATURATION: 97 % | HEIGHT: 61 IN | RESPIRATION RATE: 18 BRPM | HEART RATE: 76 BPM | WEIGHT: 126.1 LBS | SYSTOLIC BLOOD PRESSURE: 98 MMHG | TEMPERATURE: 98 F | DIASTOLIC BLOOD PRESSURE: 64 MMHG

## 2019-04-23 VITALS
SYSTOLIC BLOOD PRESSURE: 100 MMHG | RESPIRATION RATE: 14 BRPM | DIASTOLIC BLOOD PRESSURE: 58 MMHG | TEMPERATURE: 98 F | OXYGEN SATURATION: 98 % | HEART RATE: 74 BPM

## 2019-04-23 DIAGNOSIS — R55 SYNCOPE AND COLLAPSE: ICD-10-CM

## 2019-04-23 DIAGNOSIS — D35.2 BENIGN NEOPLASM OF PITUITARY GLAND: Chronic | ICD-10-CM

## 2019-04-23 LAB
ANION GAP SERPL CALC-SCNC: 8 MMOL/L — SIGNIFICANT CHANGE UP (ref 5–17)
BUN SERPL-MCNC: 19 MG/DL — SIGNIFICANT CHANGE UP (ref 7–23)
CALCIUM SERPL-MCNC: 9.6 MG/DL — SIGNIFICANT CHANGE UP (ref 8.4–10.5)
CHLORIDE SERPL-SCNC: 107 MMOL/L — SIGNIFICANT CHANGE UP (ref 96–108)
CO2 SERPL-SCNC: 27 MMOL/L — SIGNIFICANT CHANGE UP (ref 22–31)
CREAT SERPL-MCNC: 0.6 MG/DL — SIGNIFICANT CHANGE UP (ref 0.5–1.3)
GLUCOSE SERPL-MCNC: 82 MG/DL — SIGNIFICANT CHANGE UP (ref 70–99)
HCT VFR BLD CALC: 40.9 % — SIGNIFICANT CHANGE UP (ref 34.5–45)
HCV AB S/CO SERPL IA: 0.16 S/CO — SIGNIFICANT CHANGE UP (ref 0–0.99)
HCV AB SERPL-IMP: SIGNIFICANT CHANGE UP
HGB BLD-MCNC: 13.5 G/DL — SIGNIFICANT CHANGE UP (ref 11.5–15.5)
MCHC RBC-ENTMCNC: 29.7 PG — SIGNIFICANT CHANGE UP (ref 27–34)
MCHC RBC-ENTMCNC: 33 GM/DL — SIGNIFICANT CHANGE UP (ref 32–36)
MCV RBC AUTO: 90 FL — SIGNIFICANT CHANGE UP (ref 80–100)
PLATELET # BLD AUTO: 243 K/UL — SIGNIFICANT CHANGE UP (ref 150–400)
POTASSIUM SERPL-MCNC: 4.1 MMOL/L — SIGNIFICANT CHANGE UP (ref 3.5–5.3)
POTASSIUM SERPL-SCNC: 4.1 MMOL/L — SIGNIFICANT CHANGE UP (ref 3.5–5.3)
RBC # BLD: 4.55 M/UL — SIGNIFICANT CHANGE UP (ref 3.8–5.2)
RBC # FLD: 11.8 % — SIGNIFICANT CHANGE UP (ref 10.3–14.5)
SODIUM SERPL-SCNC: 142 MMOL/L — SIGNIFICANT CHANGE UP (ref 135–145)
TROPONIN I SERPL-MCNC: <.017 NG/ML — LOW (ref 0.02–0.06)
WBC # BLD: 4.2 K/UL — SIGNIFICANT CHANGE UP (ref 3.8–10.5)
WBC # FLD AUTO: 4.2 K/UL — SIGNIFICANT CHANGE UP (ref 3.8–10.5)

## 2019-04-23 PROCEDURE — C1769: CPT

## 2019-04-23 PROCEDURE — 93458 L HRT ARTERY/VENTRICLE ANGIO: CPT

## 2019-04-23 PROCEDURE — 99233 SBSQ HOSP IP/OBS HIGH 50: CPT

## 2019-04-23 PROCEDURE — C1894: CPT

## 2019-04-23 PROCEDURE — 93010 ELECTROCARDIOGRAM REPORT: CPT | Mod: 77

## 2019-04-23 PROCEDURE — 99152 MOD SED SAME PHYS/QHP 5/>YRS: CPT | Mod: GC

## 2019-04-23 PROCEDURE — 93306 TTE W/DOPPLER COMPLETE: CPT | Mod: 26

## 2019-04-23 PROCEDURE — 93010 ELECTROCARDIOGRAM REPORT: CPT

## 2019-04-23 PROCEDURE — C1887: CPT

## 2019-04-23 PROCEDURE — 99238 HOSP IP/OBS DSCHRG MGMT 30/<: CPT | Mod: GC

## 2019-04-23 PROCEDURE — 93005 ELECTROCARDIOGRAM TRACING: CPT

## 2019-04-23 PROCEDURE — 99152 MOD SED SAME PHYS/QHP 5/>YRS: CPT

## 2019-04-23 PROCEDURE — 93458 L HRT ARTERY/VENTRICLE ANGIO: CPT | Mod: 26,GC

## 2019-04-23 RX ORDER — ASPIRIN/CALCIUM CARB/MAGNESIUM 324 MG
1 TABLET ORAL
Qty: 30 | Refills: 0 | OUTPATIENT
Start: 2019-04-23 | End: 2019-05-22

## 2019-04-23 RX ORDER — ACETAMINOPHEN 500 MG
650 TABLET ORAL ONCE
Qty: 0 | Refills: 0 | Status: COMPLETED | OUTPATIENT
Start: 2019-04-23 | End: 2019-04-23

## 2019-04-23 RX ORDER — METHOCARBAMOL 500 MG/1
1 TABLET, FILM COATED ORAL
Qty: 15 | Refills: 0
Start: 2019-04-23 | End: 2019-04-27

## 2019-04-23 RX ORDER — ERGOCALCIFEROL 1.25 MG/1
1 CAPSULE ORAL
Qty: 0 | Refills: 0 | COMMUNITY

## 2019-04-23 RX ADMIN — Medication 81 MILLIGRAM(S): at 12:18

## 2019-04-23 RX ADMIN — HEPARIN SODIUM 5000 UNIT(S): 5000 INJECTION INTRAVENOUS; SUBCUTANEOUS at 05:03

## 2019-04-23 RX ADMIN — Medication 650 MILLIGRAM(S): at 12:50

## 2019-04-23 RX ADMIN — Medication 650 MILLIGRAM(S): at 13:00

## 2019-04-23 RX ADMIN — Medication 650 MILLIGRAM(S): at 20:50

## 2019-04-23 RX ADMIN — Medication 650 MILLIGRAM(S): at 20:20

## 2019-04-23 NOTE — DISCHARGE NOTE PROVIDER - NSDCCPCAREPLAN_GEN_ALL_CORE_FT
PRINCIPAL DISCHARGE DIAGNOSIS  Diagnosis: Chest pain  Assessment and Plan of Treatment: No heavy lifting for 2 weeks, no strenuous activity  ( pushing/ pulling) no driving for x 2 days,  you may shower 24 hours following procedure but no bathing or swimming for x1  week, no strenuous sex for x 1 week & follow up with your cardiologist in 1-2 week

## 2019-04-23 NOTE — DISCHARGE NOTE PROVIDER - CARE PROVIDER_API CALL
Tali Morfin)  Family Medicine  [address] [place] [address]  Phone: [phone]  Fax: [phone]  Follow Up Time:

## 2019-04-23 NOTE — DISCHARGE NOTE PROVIDER - NSDCCPCAREPLAN_GEN_ALL_CORE_FT
PRINCIPAL DISCHARGE DIAGNOSIS  Diagnosis: Chest pain, unspecified type  Assessment and Plan of Treatment: - During this admission, you were evaluated for Acute Coronary Syndrome, but no evidence of this was found  - Continue taking simvastatin as prescribed  - Start taking aspirin as prescribed  - Follow up with your cardiologist and primary MD within the next week  - An appointment has been made with [facility] on FILL IN HERE      SECONDARY DISCHARGE DIAGNOSES  Diagnosis: Dizzinesses  Assessment and Plan of Treatment:     Diagnosis: Hypercholesteremia  Assessment and Plan of Treatment: - Continue taking simvastatin as prescribed PRINCIPAL DISCHARGE DIAGNOSIS  Diagnosis: Chest pain, unspecified type  Assessment and Plan of Treatment: - During this admission, you were evaluated for Acute Coronary Syndrome, but no evidence of this was found  - Continue taking simvastatin as prescribed  - Start taking aspirin as prescribed  - Follow up with your cardiologist and primary MD within the next week  - An appointment has been made with [facility] with Dr Trotter on Wednesday, May 1st at 5pm      SECONDARY DISCHARGE DIAGNOSES  Diagnosis: Dizzinesses  Assessment and Plan of Treatment: - Follow up with your primary MD    Diagnosis: Hypercholesteremia  Assessment and Plan of Treatment: - Continue taking simvastatin as prescribed PRINCIPAL DISCHARGE DIAGNOSIS  Diagnosis: Chest pain, unspecified type  Assessment and Plan of Treatment: - During this admission, you were evaluated for Acute Coronary Syndrome, but no evidence of this was immediately found  - You will be transferred to [facility] for a cardiac catheterization, as we have agreed  - Continue taking simvastatin as prescribed  - Start taking aspirin as prescribed  - Follow up with your cardiologist and primary MD within the next week  - An appointment has been made with [facility] with Dr Trotter on Wednesday, May 1st at 5pm  - We will go over the pending echo results at that time      SECONDARY DISCHARGE DIAGNOSES  Diagnosis: Dizzinesses  Assessment and Plan of Treatment: - Follow up with your primary MD    Diagnosis: Hypercholesteremia  Assessment and Plan of Treatment: - Continue taking simvastatin as prescribed

## 2019-04-23 NOTE — PROGRESS NOTE ADULT - ATTENDING COMMENTS
chest pains left arm radiation abnormal nuclear stress tet  suspect some coronary disease. consider cardiac cath and angiography. pros and cons discussed with dr alexandra and junito. patient accepts inherent risk and wishes to proceed. will notify dr tobias.

## 2019-04-23 NOTE — DISCHARGE NOTE NURSING/CASE MANAGEMENT/SOCIAL WORK - NSDCDPATPORTLINK_GEN_ALL_CORE
You can access the [facility] Patient Portal, offered by [facility], by registering with the following website: [email or web address]

## 2019-04-23 NOTE — H&P CARDIOLOGY - HISTORY OF PRESENT ILLNESS
58F w/PMH of HLD, hypothyroidism presents w/ a 2 day hx of chest pressure and dizziness. Pt reports yesterday evening while cleaning a table, she began to experience worsening substernal chest pressure radiating to L arm associated w/ nausea. This pressure was made somewhat better after resting. On AM of day of admission, pt awoke w/ this chest pressure and new onset dizziness as well. Pt reports the chest pressure was similar to an event back in December, but the dizziness was new. Dizziness waxing/waning, began when she arose from bed. Denies recent illnesses, fever/chills, vomiting, diarrhea, cough. s/p echo 4/22/19Left ventricular ejection fraction, by visual estimation, is 60%. Normal global left ventricular systolic function, Spectral Doppler shows impaired relaxation pattern of left   ventricular myocardial filling (Grade I diastolic dysfunction).. Mild thickening and calcification of the anterior and posterior   mitral valve leaflets. LA volume Index is 18.4 ml/m² ml/m2.      In the ED, Trop neg x1, EKG NSR, no ST changes. Pt noted to have brief episode of hypotension and was given 1L NS bolus. SBP returned to 130s.  s/p 58F w/PMH of HLD, hypothyroidism presents w/ a 2 day hx of chest pressure and dizziness. Pt reports yesterday evening while cleaning a table, she began to experience worsening substernal chest pressure radiating to L arm associated w/ nausea. This pressure was made somewhat better after resting. On AM of day of admission, pt awoke w/ this chest pressure and new onset dizziness as well. Pt reports the chest pressure was similar to an event back in December, but the dizziness was new. Dizziness waxing/waning, began when she arose from bed. Denies recent illnesses, fever/chills, vomiting, diarrhea, cough. mildly abnormal stress test several month ago,  s/p echo 4/22/19Left ventricular ejection fraction, by visual estimation, is 60%. Normal global left ventricular systolic function, Spectral Doppler shows impaired relaxation pattern of left ventricular myocardial filling (Grade I diastolic dysfunction).. Mild thickening and calcification of the anterior and posterior   mitral valve leaflets. LA volume Index is 18.4 ml/m² ml/m2. Seen & evaluated by cardiologist & now recommends for cardiac cath.      In the ED, Trop neg x1, EKG NSR, no ST changes. Pt noted to have brief episode of hypotension and was given 1L NS bolus. SBP returned to 130s.  s/p

## 2019-04-23 NOTE — DISCHARGE NOTE NURSING/CASE MANAGEMENT/SOCIAL WORK - NSDCDPATPORTLINK_GEN_ALL_CORE
You can access the [email or web address] Patient Portal, offered by [facility], by registering with the following website: [email or web address]

## 2019-04-23 NOTE — DISCHARGE NOTE PROVIDER - HOSPITAL COURSE
58F w/PMH of HLD, hypothyroidism presents w/ a 2 day hx of chest pressure and dizziness. Pt reports yesterday evening while cleaning a table, she began to experience worsening substernal chest pressure radiating to L arm associated w/ nausea. This pressure was made somewhat better after resting. On AM of day of admission, pt awoke w/ this chest pressure and new onset dizziness as well. Pt reports the chest pressure was similar to an event back in December, but the dizziness was new. Dizziness waxing/waning, began when she arose from bed. Denies recent illnesses, fever/chills, vomiting, diarrhea, cough.         In the ED, Trop neg x1, EKG NSR, no ST changes. Pt noted to have brief episode of hypotension and was given 1L NS bolus. SBP returned to 130s. 58F w/PMH of HLD, hypothyroidism presents w/ a 2 day hx of chest pressure and dizziness. Pt reports yesterday evening while cleaning a table, she began to experience worsening substernal chest pressure radiating to L arm associated w/ nausea. This pressure was made somewhat better after resting. On AM of day of admission, pt awoke w/ this chest pressure and new onset dizziness as well. Pt reports the chest pressure was similar to an event back in December, but the dizziness was new. Dizziness waxing/waning, began when she arose from bed. Denies recent illnesses, fever/chills, vomiting, diarrhea, cough. In the ED, Trop neg x1, EKG NSR, no ST changes. Pt noted to have brief episode of hypotension and was given 1L NS bolus. SBP returned to 130s.        Repeat Trop I negative, CP resolved but pt experiencing dizziness. No issues on tele overnight though. Suspect BPPV. Pt evaluated by cardiology and recommendation to transfer to [place] for cardiac cath made. Pt agrees, consents signed. Echo read pending.        Vital Signs Last 24 Hrs    T(C): 36.7 (23 Apr 2019 12:15), Max: 37.1 (22 Apr 2019 20:26)    T(F): 98 (23 Apr 2019 12:15), Max: 98.8 (22 Apr 2019 20:26)    HR: 74 (23 Apr 2019 12:15) (65 - 82)    BP: 100/58 (23 Apr 2019 12:15) (92/59 - 115/46)    BP(mean): --    RR: 14 (23 Apr 2019 12:15) (14 - 18)    SpO2: 98% (23 Apr 2019 12:15) (98% - 98%)        Constitutional: Pt lying in bed, awake and alert, NAD    HEENT: EOMI, normal hearing, moist mucous membranes    Neck: Soft and supple, no JVD    Respiratory: CTABL, No wheezing, rales or rhonchi    Cardiovascular: S1S2+, RRR, no M/G/R    Gastrointestinal: BS+, soft, NT/ND, no guarding, no rebound    Extremities: No peripheral edema    Vascular: 2+ peripheral pulses    Neurological: AAOx3, no focal deficits    Musculoskeletal: 5/5 strength b/l upper and lower extremities    Skin: No rashes

## 2019-04-23 NOTE — DISCHARGE NOTE PROVIDER - HOSPITAL COURSE
HPI:    58F w/PMH of HLD, hypothyroidism presents w/ a 2 day hx of chest pressure and dizziness. Pt reports yesterday evening while cleaning a table, she began to experience worsening substernal chest pressure radiating to L arm associated w/ nausea. This pressure was made somewhat better after resting. On AM of day of admission, pt awoke w/ this chest pressure and new onset dizziness as well. Pt reports the chest pressure was similar to an event back in December, but the dizziness was new. Dizziness waxing/waning, began when she arose from bed. Denies recent illnesses, fever/chills, vomiting, diarrhea, cough. mildly abnormal stress test several month ago,  s/p echo 4/22/19Left ventricular ejection fraction, by visual estimation, is 60%. Normal global left ventricular systolic function, Spectral Doppler shows impaired relaxation pattern of left ventricular myocardial filling (Grade I diastolic dysfunction).. Mild thickening and calcification of the anterior and posterior     mitral valve leaflets. LA volume Index is 18.4 ml/m² ml/m2. Seen & evaluated by cardiologist & now recommends for cardiac cath.            In the ED, Trop neg x1, EKG NSR, no ST changes. Pt noted to have brief episode of hypotension and was given 1L NS bolus. SBP returned to 130s.    s/p (23 Apr 2019 14:48)    s/p cardiac cath normal cors

## 2019-04-23 NOTE — PROGRESS NOTE ADULT - SUBJECTIVE AND OBJECTIVE BOX
Follow up for  SUBJ:    mostly dizziness, but also has chest pains that radiate down left arm consistent with angina      PMH  Hypothyroid  Hypercholesterolemia      MEDICATIONS  (STANDING):  aspirin enteric coated 81 milliGRAM(s) Oral daily  atorvastatin 10 milliGRAM(s) Oral at bedtime  heparin  Injectable 5000 Unit(s) SubCutaneous every 8 hours    MEDICATIONS  (PRN):        PHYSICAL EXAM:  Vital Signs Last 24 Hrs  T(C): 36.7 (23 Apr 2019 09:01), Max: 37.1 (22 Apr 2019 20:26)  T(F): 98 (23 Apr 2019 09:01), Max: 98.8 (22 Apr 2019 20:26)  HR: 75 (23 Apr 2019 09:01) (65 - 82)  BP: 92/59 (23 Apr 2019 06:29) (92/59 - 115/46)  BP(mean): --  RR: 16 (23 Apr 2019 06:29) (16 - 18)  SpO2: 98% (23 Apr 2019 06:29) (97% - 98%)    GENERAL: NAD, well-groomed, well-developed  HEAD:  Atraumatic, Normocephalic  EYES: EOMI, PERRLA, conjunctiva and sclera clear  ENT: Moist mucous membranes,  NECK: Supple, No JVD, no bruits  CHEST/LUNG: Clear to percussion bilaterally; No rales, rhonchi, wheezing, or rubs  HEART: Regular rate and rhythm; No murmurs, rubs, or gallops PMI non displaced.  ABDOMEN: Soft, Nontender, Nondistended; Bowel sounds present  EXTREMITIES:  2+ Peripheral Pulses, No clubbing, cyanosis, or edema  SKIN: No rashes or lesions  NERVOUS SYSTEM:  Cranial Nerves II-XII intact      TELEMETRY:    rsr    ECG:    < from: 12 Lead ECG (04.22.19 @ 09:35) >  Ventricular Rate 67 BPM    Atrial Rate 67 BPM    P-R Interval 184 ms    QRS Duration 72 ms    Q-T Interval 354 ms    QTC Calculation(Bezet) 374 ms    P Axis 56 degrees    R Axis 95 degrees    T Axis 74 degrees    Diagnosis Line Normal sinus rhythm  Rightward axis    When compared with ECG of 12-AUG-2017 03:30,  No significant change was found  Confirmed by DOMINGUEZ DIETRICH MD ([id number]) on 4/22/2019 4:55:18 PM    < end of copied text >    ECHO:    10/17/16 normal ef      LABS:                        13.5   4.2   )-----------( 243      ( 23 Apr 2019 06:00 )             40.9     04-23    142  |  107  |  19  ----------------------------<  82  4.1   |  27  |  0.60    Ca    9.6      23 Apr 2019 06:00    TPro  7.6  /  Alb  3.8  /  TBili  0.6  /  DBili  x   /  AST  21  /  ALT  23  /  AlkPhos  79  04-22    CARDIAC MARKERS ( 23 Apr 2019 06:00 )  <.017 ng/mL / x     / x     / x     / x      CARDIAC MARKERS ( 22 Apr 2019 09:45 )  <.017 ng/mL / x     / x     / x     / x            I&O's Summary    22 Apr 2019 07:01  -  23 Apr 2019 07:00  --------------------------------------------------------  IN: 100 mL / OUT: 1 mL / NET: 99 mL      BNP    RADIOLOGY & ADDITIONAL STUDIES:    < from: Xray Chest 1 View AP/PA (04.22.19 @ 09:45) >  EXAM:  XR CHEST AP OR PA 1V      PROCEDURE DATE:  04/22/2019        INTERPRETATION:  INDICATION: Chest Pain    PRIORS: 12/6/2018    VIEWS: AP radiography of the chest performed. Evaluation limited   secondary to shallow inspiration.    FINDINGS: Heart size appears within normal limits. No hilar or superior   mediastinal abnormalities are identified.    There is no evidence for focal infiltrate, lobar consolidation or   pulmonary edema. No pleural effusion or pneumothorax is demonstrated. No   mediastinal shift is noted. The visualized osseous structures appear   unremarkable.    IMPRESSION: No evidence for focal infiltrate or lobar consolidation.        EAN NARVAEZ M.D., ATTENDING RADIOLOGIST  This document has been electronically signed. Apr 22 2019  9:54AM    < end of copied text >      ECHO:

## 2019-04-29 ENCOUNTER — OUTPATIENT (OUTPATIENT)
Dept: OUTPATIENT SERVICES | Facility: HOSPITAL | Age: 59
LOS: 1 days | End: 2019-04-29
Payer: SELF-PAY

## 2019-04-29 ENCOUNTER — APPOINTMENT (OUTPATIENT)
Age: 59
End: 2019-04-29

## 2019-04-29 VITALS
SYSTOLIC BLOOD PRESSURE: 121 MMHG | HEART RATE: 76 BPM | RESPIRATION RATE: 14 BRPM | WEIGHT: 125 LBS | BODY MASS INDEX: 24.54 KG/M2 | OXYGEN SATURATION: 99 % | TEMPERATURE: 98.2 F | DIASTOLIC BLOOD PRESSURE: 82 MMHG | HEIGHT: 60 IN

## 2019-04-29 DIAGNOSIS — Z00.00 ENCOUNTER FOR GENERAL ADULT MEDICAL EXAMINATION WITHOUT ABNORMAL FINDINGS: ICD-10-CM

## 2019-04-29 DIAGNOSIS — D35.2 BENIGN NEOPLASM OF PITUITARY GLAND: Chronic | ICD-10-CM

## 2019-04-29 PROCEDURE — G0463: CPT

## 2019-04-29 PROCEDURE — 84439 ASSAY OF FREE THYROXINE: CPT

## 2019-04-29 PROCEDURE — 84443 ASSAY THYROID STIM HORMONE: CPT

## 2019-04-29 PROCEDURE — 36415 COLL VENOUS BLD VENIPUNCTURE: CPT

## 2019-04-29 RX ORDER — METHOCARBAMOL 750 MG/1
750 TABLET, FILM COATED ORAL EVERY 8 HOURS
Qty: 15 | Refills: 1 | Status: COMPLETED | COMMUNITY
Start: 2018-12-26 | End: 2019-04-29

## 2019-04-29 RX ORDER — SIMVASTATIN 20 MG/1
20 TABLET, FILM COATED ORAL DAILY
Qty: 30 | Refills: 5 | Status: DISCONTINUED | COMMUNITY
Start: 2019-04-03 | End: 2019-04-29

## 2019-04-29 NOTE — PHYSICAL EXAM
[No Acute Distress] : no acute distress [Well-Appearing] : well-appearing [Supple] : supple [Thyroid Normal, No Nodules] : the thyroid was normal and there were no nodules present [No Respiratory Distress] : no respiratory distress  [Clear to Auscultation] : lungs were clear to auscultation bilaterally [Normal Rate] : normal rate  [Regular Rhythm] : with a regular rhythm [Normal S1, S2] : normal S1 and S2 [No Murmur] : no murmur heard [Soft] : abdomen soft [Non-distended] : non-distended [Normal Bowel Sounds] : normal bowel sounds [No Focal Deficits] : no focal deficits

## 2019-04-29 NOTE — ASSESSMENT
[FreeTextEntry1] : 59 yo female with h/o hypothyroidism, HLD, following up post-hospital discharge for chest pressure pain and dizziness. Pt reports that she has been feeling well since discharge, no further episodes of chest pain/pressure or dizziness.\par \par #s/p hospital stay for chest pressure/dizziness\par - f/u with Cardiologist, Dr. Teran as scheduled\par - Pt wishes to take simvastatin 10mg instead of 20mg due to c/o dizziness and headache with the high dose\par - Rx for Simvastatin 10mg daily sent to pharmacy\par \par #HLD\par - continue on simvastatin\par \par Hypothyroidism\par -TSH lab sent today\par -Will followup with result\par \par Return to clinic in 2 months for follow up visit. Will be also due to mammogram at that time\par \par \par Discussed with Dr. Gonzales

## 2019-04-30 DIAGNOSIS — Z09 ENCOUNTER FOR FOLLOW-UP EXAMINATION AFTER COMPLETED TREATMENT FOR CONDITIONS OTHER THAN MALIGNANT NEOPLASM: ICD-10-CM

## 2019-05-01 ENCOUNTER — APPOINTMENT (OUTPATIENT)
Dept: CARDIOLOGY | Facility: HOSPITAL | Age: 59
End: 2019-05-01

## 2019-05-01 ENCOUNTER — NON-APPOINTMENT (OUTPATIENT)
Age: 59
End: 2019-05-01

## 2019-05-01 ENCOUNTER — APPOINTMENT (OUTPATIENT)
Dept: FAMILY MEDICINE | Facility: HOSPITAL | Age: 59
End: 2019-05-01

## 2019-05-01 ENCOUNTER — OUTPATIENT (OUTPATIENT)
Dept: OUTPATIENT SERVICES | Facility: HOSPITAL | Age: 59
LOS: 1 days | End: 2019-05-01
Payer: SELF-PAY

## 2019-05-01 VITALS
DIASTOLIC BLOOD PRESSURE: 74 MMHG | HEIGHT: 60 IN | WEIGHT: 125 LBS | SYSTOLIC BLOOD PRESSURE: 107 MMHG | HEART RATE: 81 BPM | BODY MASS INDEX: 24.54 KG/M2 | OXYGEN SATURATION: 100 %

## 2019-05-01 DIAGNOSIS — D35.2 BENIGN NEOPLASM OF PITUITARY GLAND: Chronic | ICD-10-CM

## 2019-05-01 DIAGNOSIS — I25.10 ATHEROSCLEROTIC HEART DISEASE OF NATIVE CORONARY ARTERY WITHOUT ANGINA PECTORIS: ICD-10-CM

## 2019-05-01 PROCEDURE — 93005 ELECTROCARDIOGRAM TRACING: CPT

## 2019-05-01 PROCEDURE — G0463: CPT

## 2019-05-01 NOTE — PHYSICAL EXAM
[General Appearance - Well Developed] : well developed [General Appearance - Well Nourished] : well nourished [Normal Oral Mucosa] : normal oral mucosa [Normal Oropharynx] : normal oropharynx [Normal Jugular Venous A Waves Present] : normal jugular venous A waves present [Normal Jugular Venous V Waves Present] : normal jugular venous V waves present [Respiration, Rhythm And Depth] : normal respiratory rhythm and effort [No Jugular Venous Malone A Waves] : no jugular venous malone A waves [Heart Rate And Rhythm] : heart rate and rhythm were normal [Heart Sounds] : normal S1 and S2 [Abnormal Walk] : normal gait [Nail Clubbing] : no clubbing of the fingernails [Cyanosis, Localized] : no localized cyanosis [Nail Splinter Hemorrhages] : no splinter hemorrhages of the nails [Skin Color & Pigmentation] : normal skin color and pigmentation [Skin Turgor] : normal skin turgor [Oriented To Time, Place, And Person] : oriented to person, place, and time

## 2019-05-02 DIAGNOSIS — E78.5 HYPERLIPIDEMIA, UNSPECIFIED: ICD-10-CM

## 2019-05-02 NOTE — DISCUSSION/SUMMARY
[FreeTextEntry1] : 58 year old woman with HLD who presents with spells of dizziness, palpitations as well as extreme fatigue. TSH WNL, Hb within normal. Prior TTE with restrictive filling (no images available) with low voltage. Coronary disease rule out, unclear cause for symptoms. Will rule out arrhythmias and reaccess restrictive echo given ekg.\par -obtain holter/zio patch\par -obtain BAILEE \par -continue aspirin\par -c/w simvastatin 20 mg. Admit high intensity preferred but given tolerance issues, will stay here\par -will work up symptoms from cardiac standpoint, possibly pituitary issue which can be reaccessed by endocrine. \par -educated patient on red flags that should prompt ER visit. \par -follow up to clinic in 4 months. \par  \par

## 2019-05-02 NOTE — REASON FOR VISIT
[FreeTextEntry1] : Peggy Guevara is a 58 year old woman with HLD who presented last month for a first time visit. She recently underwent an exercise stress test in December 2018. She accomplished 10 mets, excellent for age and gender but also had a small, mild apical fixed defect, not  reversible. She was medically managed but continued to have mostly dizziness on and off, as well as intermittent chest pain. She underwent a left cardiac catheterization last week, which was normal. She continues to have similar symptoms, mainly dizziness and headaches. She admits that she had some palpitations as well during these episodes. She denies any SOB, palpitations, PND, orthopnea, etc at that time. \par \par She was started on a statin, aspirin, and is doing well without any further symptoms. However, she states that she has self dc'ed atorvastin because of headaches. She was on simvastatin previously

## 2019-05-03 LAB
T4 FREE SERPL-MCNC: 1 NG/DL
TSH SERPL-ACNC: 2.1 UIU/ML

## 2019-05-15 ENCOUNTER — OUTPATIENT (OUTPATIENT)
Dept: OUTPATIENT SERVICES | Facility: HOSPITAL | Age: 59
LOS: 1 days | End: 2019-05-15
Payer: SELF-PAY

## 2019-05-15 ENCOUNTER — APPOINTMENT (OUTPATIENT)
Dept: CARDIOLOGY | Facility: HOSPITAL | Age: 59
End: 2019-05-15

## 2019-05-15 VITALS
WEIGHT: 125 LBS | HEART RATE: 82 BPM | SYSTOLIC BLOOD PRESSURE: 113 MMHG | HEIGHT: 60 IN | BODY MASS INDEX: 24.54 KG/M2 | OXYGEN SATURATION: 100 % | DIASTOLIC BLOOD PRESSURE: 77 MMHG

## 2019-05-15 DIAGNOSIS — I25.10 ATHEROSCLEROTIC HEART DISEASE OF NATIVE CORONARY ARTERY WITHOUT ANGINA PECTORIS: ICD-10-CM

## 2019-05-15 DIAGNOSIS — D35.2 BENIGN NEOPLASM OF PITUITARY GLAND: Chronic | ICD-10-CM

## 2019-05-15 PROCEDURE — G0463: CPT

## 2019-05-15 NOTE — REASON FOR VISIT
[Follow-Up - Clinic] : a clinic follow-up of [FreeTextEntry1] : Peggy Guevara is a 58 year old woman with HLD who presented last month for a first time visit. She recently underwent an exercise stress test in December 2018. She accomplished 10 mets, excellent for age and gender but also had a small, mild apical fixed defect, not reversible. She was medically managed but continued to have mostly dizziness on and off, as well as intermittent chest pain. She underwent a left cardiac catheterization last week, which was normal. She continues to have similar symptoms, mainly dizziness and headaches. She admits that she had some palpitations as well during these episodes. She denies any SOB, palpitations, PND, orthopnea, etc at that time. \par \par She was started on a statin, aspirin, and is doing well without any further symptoms. However, she states that she has self dc'ed atorvastin because of headaches. She was on simvastatin previously

## 2019-05-15 NOTE — DISCUSSION/SUMMARY
[FreeTextEntry1] : 58 year old woman with HLD who presents with spells of dizziness, palpitations as well as extreme fatigue. TSH WNL, Hb within normal. Prior TTE with restrictive filling (no images available) with low voltage. Coronary disease rule out, unclear cause for symptoms. Will rule out arrhythmias and reaccess restrictive echo given ekg.\par -holter/zio patch sent in today\par -obtain TTE here to review imaging\par -continue aspirin\par -c/w simvastatin 20 mg. Admit high intensity preferred but given tolerance issues, will stay here\par -will work up symptoms from cardiac standpoint, possibly pituitary issue which can be reaccessed by endocrine. \par -educated patient on red flags that should prompt ER visit. \par -follow up to clinic in 4 months. \par

## 2019-05-15 NOTE — PHYSICAL EXAM
[General Appearance - Well Developed] : well developed [Normal Conjunctiva] : the conjunctiva exhibited no abnormalities [General Appearance - Well Nourished] : well nourished [Normal Oral Mucosa] : normal oral mucosa [No Oral Pallor] : no oral pallor [Respiration, Rhythm And Depth] : normal respiratory rhythm and effort [Heart Sounds] : normal S1 and S2 [Heart Rate And Rhythm] : heart rate and rhythm were normal [Bowel Sounds] : normal bowel sounds [Abdomen Soft] : soft [Abnormal Walk] : normal gait [Nail Clubbing] : no clubbing of the fingernails [Cyanosis, Localized] : no localized cyanosis [Skin Turgor] : normal skin turgor [Skin Color & Pigmentation] : normal skin color and pigmentation

## 2019-05-16 DIAGNOSIS — R42 DIZZINESS AND GIDDINESS: ICD-10-CM

## 2019-05-29 ENCOUNTER — OUTPATIENT (OUTPATIENT)
Dept: OUTPATIENT SERVICES | Facility: HOSPITAL | Age: 59
LOS: 1 days | End: 2019-05-29
Payer: SELF-PAY

## 2019-05-29 ENCOUNTER — APPOINTMENT (OUTPATIENT)
Dept: CV DIAGNOSITCS | Facility: HOSPITAL | Age: 59
End: 2019-05-29

## 2019-05-29 ENCOUNTER — APPOINTMENT (OUTPATIENT)
Dept: CARDIOLOGY | Facility: HOSPITAL | Age: 59
End: 2019-05-29

## 2019-05-29 VITALS
OXYGEN SATURATION: 99 % | SYSTOLIC BLOOD PRESSURE: 117 MMHG | DIASTOLIC BLOOD PRESSURE: 76 MMHG | WEIGHT: 125 LBS | HEART RATE: 86 BPM | HEIGHT: 60 IN | BODY MASS INDEX: 24.54 KG/M2

## 2019-05-29 DIAGNOSIS — D35.2 BENIGN NEOPLASM OF PITUITARY GLAND: Chronic | ICD-10-CM

## 2019-05-29 DIAGNOSIS — I25.10 ATHEROSCLEROTIC HEART DISEASE OF NATIVE CORONARY ARTERY WITHOUT ANGINA PECTORIS: ICD-10-CM

## 2019-05-29 DIAGNOSIS — R42 DIZZINESS AND GIDDINESS: ICD-10-CM

## 2019-05-29 PROCEDURE — 93306 TTE W/DOPPLER COMPLETE: CPT | Mod: 26

## 2019-05-29 PROCEDURE — G0463: CPT

## 2019-05-29 PROCEDURE — 93306 TTE W/DOPPLER COMPLETE: CPT

## 2019-05-29 NOTE — PHYSICAL EXAM
[Normal Conjunctiva] : the conjunctiva exhibited no abnormalities [General Appearance - Well Nourished] : well nourished [General Appearance - Well Developed] : well developed [Normal Oral Mucosa] : normal oral mucosa [Normal Jugular Venous V Waves Present] : normal jugular venous V waves present [Normal Oropharynx] : normal oropharynx [Respiration, Rhythm And Depth] : normal respiratory rhythm and effort [Heart Rate And Rhythm] : heart rate and rhythm were normal [Heart Sounds] : normal S1 and S2 [Murmurs] : no murmurs present [Arterial Pulses Normal] : the arterial pulses were normal [Bowel Sounds] : normal bowel sounds [Abdomen Soft] : soft

## 2019-05-30 DIAGNOSIS — E78.5 HYPERLIPIDEMIA, UNSPECIFIED: ICD-10-CM

## 2019-05-31 NOTE — REASON FOR VISIT
[Follow-Up - Clinic] : a clinic follow-up of [FreeTextEntry1] : Peggy Guevara is a 58 year old woman with HLD who presented last month for a first time visit. She recently underwent an exercise stress test in December 2018. She accomplished 10 mets, excellent for age and gender but also had a small, mild apical fixed defect, not reversible. She was medically managed but continued to have mostly dizziness on and off, as well as intermittent chest pain. She underwent a left cardiac catheterization last week, which was normal. She continues to have similar symptoms, mainly dizziness and headaches. She admits that she had some palpitations as well during these episodes. She denies any SOB, palpitations, PND, orthopnea, etc at that time. A recent Zio monitor revealed that her triggered events were in sinus rhythm in the 90s. Also recent echo revealed no findings apart from stage 1 diastolic dysfunction. \par \par She was started on a statin, aspirin, and is doing well without any further symptoms. However, she states that she has self dc'ed atorvastin because of headaches. She was on simvastatin previously

## 2019-05-31 NOTE — DISCUSSION/SUMMARY
[FreeTextEntry1] : 58 year old woman with HLD who presents with spells of dizziness, palpitations as well as extreme fatigue. TSH WNL, Hb within normal. Prior TTE with restrictive filling (no images available) with low voltage. Coronary disease rule out, unclear cause for symptoms.  No findings of arrhythmias on her holter . No systolic dysfunction on TTE or thickness of walls. Symptoms of on and off fatigue point to a larger process at play, no cardiac etiologies found. Recent TSH, Cr, LFTs WNL but reasonable to follow back up with her endocrinologist. \par -continue aspirin 81 mg\par -c/w simvastatin 20 mg. Admit high intensity preferred but given tolerance issues, will stay here\par -would recommend follow up with PCP and endocrinologist. \par -educated patient on red flags that should prompt ER visit. \par -follow up to clinic in 3 months. \par

## 2019-06-03 ENCOUNTER — APPOINTMENT (OUTPATIENT)
Dept: OPHTHALMOLOGY | Facility: CLINIC | Age: 59
End: 2019-06-03

## 2019-06-12 ENCOUNTER — OUTPATIENT (OUTPATIENT)
Dept: OUTPATIENT SERVICES | Facility: HOSPITAL | Age: 59
LOS: 1 days | End: 2019-06-12
Payer: SELF-PAY

## 2019-06-12 ENCOUNTER — APPOINTMENT (OUTPATIENT)
Dept: FAMILY MEDICINE | Facility: HOSPITAL | Age: 59
End: 2019-06-12

## 2019-06-12 VITALS
RESPIRATION RATE: 14 BRPM | WEIGHT: 124 LBS | BODY MASS INDEX: 24.22 KG/M2 | DIASTOLIC BLOOD PRESSURE: 81 MMHG | OXYGEN SATURATION: 100 % | SYSTOLIC BLOOD PRESSURE: 118 MMHG | HEART RATE: 81 BPM | TEMPERATURE: 97.9 F

## 2019-06-12 DIAGNOSIS — D35.2 BENIGN NEOPLASM OF PITUITARY GLAND: Chronic | ICD-10-CM

## 2019-06-12 DIAGNOSIS — Z00.00 ENCOUNTER FOR GENERAL ADULT MEDICAL EXAMINATION WITHOUT ABNORMAL FINDINGS: ICD-10-CM

## 2019-06-12 NOTE — REVIEW OF SYSTEMS
[Fatigue] : fatigue [Dizziness] : dizziness [Insomnia] : insomnia [Negative] : Musculoskeletal [Fainting] : no fainting

## 2019-06-12 NOTE — ASSESSMENT
[FreeTextEntry1] : 58F here for follow up for dizziness and fatigue\par -could be from statin use\par -Ordered AM cortisol levels, lyme, vitamin B12, and creatine kinase\par -if normal will send pt to endocrinology for further evaluation\par -educated on sleep hygeine and recommended melatonin  before bed as pt only gets 5hrs of sleep\par \par RTC in 1 week to follow up results\par \par Discussed w/ Dr. Darby

## 2019-06-12 NOTE — ED ADULT TRIAGE NOTE - NS ED NURSE DIRECT TO ROOM YN
No PAST MEDICAL HISTORY:  Asthma does not use inhalers often    CAD (coronary artery disease) angio 2012, with 2 stents    Cancer of prostate 2011    Cataract both eyes    CHF (congestive heart failure) 2004, with cardiomyopathy, last EF 2013 = 45-50%    CVA (cerebral infarction) 2008 and 2012 - no residual as per patient; was treated with aspirin and plavix but has been taken off by his PMD about one year ago    ESRD (end stage renal disease) on dialysis right upper arm AV shunt , Monday, wed/ friday [facility] Dr. Barrientos.    GERD (gastroesophageal reflux disease)     Hepatitis C dx: ' 2010    HLD (hyperlipidemia)     Hypertension     Liver cirrhosis     Oliguria     Pacemaker     Pneumonia 2004    Sciatic nerve pain, left leg

## 2019-06-12 NOTE — PHYSICAL EXAM
[No Acute Distress] : no acute distress [Well Nourished] : well nourished [Normal Sclera/Conjunctiva] : normal sclera/conjunctiva [PERRL] : pupils equal round and reactive to light [Supple] : supple [No Respiratory Distress] : no respiratory distress  [No Accessory Muscle Use] : no accessory muscle use

## 2019-06-12 NOTE — HISTORY OF PRESENT ILLNESS
[FreeTextEntry1] : dizziness and fatigue [de-identified] : 58F who is presenting for folllow up for fatigue, dizziness, and palpitations of 2 months. Pt was worked up bt cardio and everything was negative. The pt had thyroid labs and regular labs which were all within normal limits. The pt states she is no longer having palpitaitons but she is still fatigue every day. The pt does endorse a history of insomnia. PT says she also  get dizzy randomly. The dizziness is non positional and is not associated with palpitations or loss of consciousness.

## 2019-06-12 NOTE — COUNSELING
[Sleep ___ hours/day] : Sleep [unfilled] hours/day [Plan in advance] : Plan in advance [Engage in a relaxing activity] : Engage in a relaxing activity

## 2019-06-13 DIAGNOSIS — R53.83 OTHER FATIGUE: ICD-10-CM

## 2019-06-13 DIAGNOSIS — R42 DIZZINESS AND GIDDINESS: ICD-10-CM

## 2019-06-14 ENCOUNTER — LABORATORY RESULT (OUTPATIENT)
Age: 59
End: 2019-06-14

## 2019-06-14 PROCEDURE — 86618 LYME DISEASE ANTIBODY: CPT

## 2019-06-14 PROCEDURE — 82550 ASSAY OF CK (CPK): CPT

## 2019-06-14 PROCEDURE — 82533 TOTAL CORTISOL: CPT

## 2019-06-14 PROCEDURE — G0463: CPT

## 2019-06-14 PROCEDURE — 82607 VITAMIN B-12: CPT

## 2019-06-20 LAB
B BURGDOR IGG+IGM SER QL IB: NORMAL
CK SERPL-CCNC: 65 U/L
CORTIS SERPL-MCNC: 8.3 UG/DL
VIT B12 SERPL-MCNC: 624 PG/ML

## 2019-06-21 ENCOUNTER — OUTPATIENT (OUTPATIENT)
Dept: OUTPATIENT SERVICES | Facility: HOSPITAL | Age: 59
LOS: 1 days | End: 2019-06-21
Payer: SELF-PAY

## 2019-06-21 ENCOUNTER — APPOINTMENT (OUTPATIENT)
Dept: FAMILY MEDICINE | Facility: HOSPITAL | Age: 59
End: 2019-06-21

## 2019-06-21 VITALS
BODY MASS INDEX: 24.54 KG/M2 | DIASTOLIC BLOOD PRESSURE: 76 MMHG | HEIGHT: 60 IN | HEART RATE: 67 BPM | WEIGHT: 125 LBS | OXYGEN SATURATION: 100 % | RESPIRATION RATE: 14 BRPM | SYSTOLIC BLOOD PRESSURE: 130 MMHG | TEMPERATURE: 98 F

## 2019-06-21 DIAGNOSIS — D35.2 BENIGN NEOPLASM OF PITUITARY GLAND: Chronic | ICD-10-CM

## 2019-06-21 DIAGNOSIS — Z00.00 ENCOUNTER FOR GENERAL ADULT MEDICAL EXAMINATION WITHOUT ABNORMAL FINDINGS: ICD-10-CM

## 2019-06-21 PROCEDURE — G0463: CPT

## 2019-06-21 NOTE — PHYSICAL EXAM
[No Acute Distress] : no acute distress [Well Nourished] : well nourished [Well Developed] : well developed [Well-Appearing] : well-appearing [Normal Sclera/Conjunctiva] : normal sclera/conjunctiva [PERRL] : pupils equal round and reactive to light [Normal Oropharynx] : the oropharynx was normal [No Respiratory Distress] : no respiratory distress  [No Accessory Muscle Use] : no accessory muscle use [No Edema] : there was no peripheral edema [No Rash] : no rash [Normal Gait] : normal gait [Coordination Grossly Intact] : coordination grossly intact [No Focal Deficits] : no focal deficits [Normal Affect] : the affect was normal [Normal Insight/Judgement] : insight and judgment were intact

## 2019-06-21 NOTE — HISTORY OF PRESENT ILLNESS
[[employer] ] : provided by [employer]   [FreeTextEntry1] : [id number] [de-identified] : 58F w/ hx of dizziness, fatgue, and chest palpitations who was worked up by cardio and all results were negative. PT was last here and labs were ordered which were also negative(lyme, cortisol, vit B12, creatinine kinase). Pt states she still feels fatigue and dizzy but the insomnia has improved. Explained to the patient that all her labs are normal and that we will send her to endocrinology to further evaluate for other causes that we may be missing. All questions were answered

## 2019-06-21 NOTE — ASSESSMENT
[FreeTextEntry1] : 58F here for follow up for results\par dizziness and fatigue\par -all lab results were normal\par -given a referral for endocrinology\par \par rtc prn or annual visit\par \par Discussed w/ Dr. Mesa

## 2019-06-21 NOTE — REVIEW OF SYSTEMS
[Fatigue] : fatigue [Dizziness] : dizziness [Insomnia] : insomnia [Negative] : Heme/Lymph [de-identified] : insomnia improved

## 2019-06-24 DIAGNOSIS — R53.83 OTHER FATIGUE: ICD-10-CM

## 2019-06-24 DIAGNOSIS — R42 DIZZINESS AND GIDDINESS: ICD-10-CM

## 2019-07-10 PROCEDURE — 36415 COLL VENOUS BLD VENIPUNCTURE: CPT

## 2019-07-10 PROCEDURE — 93306 TTE W/DOPPLER COMPLETE: CPT

## 2019-07-10 PROCEDURE — 85027 COMPLETE CBC AUTOMATED: CPT

## 2019-07-10 PROCEDURE — 80048 BASIC METABOLIC PNL TOTAL CA: CPT

## 2019-07-10 PROCEDURE — 84484 ASSAY OF TROPONIN QUANT: CPT

## 2019-07-10 PROCEDURE — 71045 X-RAY EXAM CHEST 1 VIEW: CPT

## 2019-07-10 PROCEDURE — 86803 HEPATITIS C AB TEST: CPT

## 2019-07-10 PROCEDURE — 36000 PLACE NEEDLE IN VEIN: CPT

## 2019-07-10 PROCEDURE — 93005 ELECTROCARDIOGRAM TRACING: CPT

## 2019-07-10 PROCEDURE — 80053 COMPREHEN METABOLIC PANEL: CPT

## 2019-07-10 PROCEDURE — 99285 EMERGENCY DEPT VISIT HI MDM: CPT | Mod: 25

## 2019-07-11 ENCOUNTER — APPOINTMENT (OUTPATIENT)
Dept: ENDOCRINOLOGY | Facility: HOSPITAL | Age: 59
End: 2019-07-11

## 2019-07-11 ENCOUNTER — OUTPATIENT (OUTPATIENT)
Dept: OUTPATIENT SERVICES | Facility: HOSPITAL | Age: 59
LOS: 1 days | End: 2019-07-11
Payer: SELF-PAY

## 2019-07-11 VITALS
HEIGHT: 60 IN | RESPIRATION RATE: 14 BRPM | SYSTOLIC BLOOD PRESSURE: 111 MMHG | BODY MASS INDEX: 24.35 KG/M2 | DIASTOLIC BLOOD PRESSURE: 76 MMHG | WEIGHT: 124 LBS | HEART RATE: 79 BPM

## 2019-07-11 DIAGNOSIS — D35.2 BENIGN NEOPLASM OF PITUITARY GLAND: Chronic | ICD-10-CM

## 2019-07-11 DIAGNOSIS — E06.9 THYROIDITIS, UNSPECIFIED: ICD-10-CM

## 2019-07-11 DIAGNOSIS — E23.7 DISORDER OF PITUITARY GLAND, UNSPECIFIED: ICD-10-CM

## 2019-07-11 PROCEDURE — G0463: CPT

## 2019-07-12 DIAGNOSIS — E23.7 DISORDER OF PITUITARY GLAND, UNSPECIFIED: ICD-10-CM

## 2019-07-12 DIAGNOSIS — E78.5 HYPERLIPIDEMIA, UNSPECIFIED: ICD-10-CM

## 2019-07-12 DIAGNOSIS — E03.9 HYPOTHYROIDISM, UNSPECIFIED: ICD-10-CM

## 2019-07-12 NOTE — PHYSICAL EXAM
[Alert] : alert [Well Nourished] : well nourished [No Acute Distress] : no acute distress [EOMI] : extra ocular movement intact [Well Developed] : well developed [Normal Sclera/Conjunctiva] : normal sclera/conjunctiva [Normal Oropharynx] : the oropharynx was normal [No Proptosis] : no proptosis [Thyroid Not Enlarged] : the thyroid was not enlarged [No Thyroid Nodules] : there were no palpable thyroid nodules [No Respiratory Distress] : no respiratory distress [No Accessory Muscle Use] : no accessory muscle use [Clear to Auscultation] : lungs were clear to auscultation bilaterally [Normal Rate] : heart rate was normal  [Normal S1, S2] : normal S1 and S2 [Regular Rhythm] : with a regular rhythm [Pedal Pulses Normal] : the pedal pulses are present [No Edema] : there was no peripheral edema [Normal Bowel Sounds] : normal bowel sounds [Not Tender] : non-tender [Soft] : abdomen soft [Post Cervical Nodes] : posterior cervical nodes [Not Distended] : not distended [Anterior Cervical Nodes] : anterior cervical nodes [Normal] : normal and non tender [Spine Straight] : spine straight [No Spinal Tenderness] : no spinal tenderness [No Stigmata of Cushings Syndrome] : no stigmata of cushings syndrome [Normal Strength/Tone] : muscle strength and tone were normal [Normal Gait] : normal gait [No Rash] : no rash [Normal Reflexes] : deep tendon reflexes were 2+ and symmetric [Oriented x3] : oriented to person, place, and time [No Tremors] : no tremors [Acanthosis Nigricans] : no acanthosis nigricans

## 2019-07-12 NOTE — DISCUSSION/SUMMARY
[FreeTextEntry1] : Called the patient (Jeet,  # [id number]) and discussed thyroid labs done on 05/31/18. TSH 1.66 and FT3 was normal. FT4 was not done and patient is clinically euthyroid. Will attempt to monitor her OFF levothyroxine at this time and advised her to stop it and monitor for symptoms of fatigue, sluggishness, slowness, constipation, dry skin and to notify the clinic (to notify me). I have re-ordered TSH, FT4 and Lipids (Triglyceride f/u) for July 30 to reassess levels without levothyroxine. Patient understands and will follow advise.\par

## 2019-07-12 NOTE — DATA REVIEWED
[FreeTextEntry1] : 10/2016:\par [id number]-LDL\par [phone] A1c 5.5%\par BUN/Cr-15/0.62\par HH-13.9/39.1\par \par 07/17/16:\par [id number]-PHI-LDL\par [phone] Am Cortisol 9.8\par FT4 1.4\par

## 2019-07-12 NOTE — HISTORY OF PRESENT ILLNESS
[FreeTextEntry1] : 57 y/o female presents for a f/u visit today. \par During last appointment she reported pressure like chest pain at which time she was instructed to go to ED to rule out ACS. ACS was ruled. Patient had multiple episodes of pressure like chest pain radiating down the left arm with associated dizziness and blurred vision over the next few months. She was worked up by cardiologist, Dr Teran. ECHO, Exercise stress test, cardiac cath and Holter monitoring was all unremarkable. Last episode of Dizziness and chest pain was yesterday and lasted about 3 hours with spontaneous resolution. Currently taking ASA 81mg daily and Simvastation 10mg qhs. 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 Also with hypertriglyceridemia. \par \par 1. Rathke's cleft cyst s/p transsphenoidal resection- Surgery in 2012. \par 12/2018 MRI unremarkable post op changes in pituitary. mild sphenoidal mucosal thickening \par  2/2014 AM Cortisol of 9.2.  7/2015 AM cortisol 10.8. 7/2016  AM Cortisol 9.8. 6/2019 Cortisol 8.3.\par Patient continues to have have headache and dizziness. Denies ringing in the ear. Occurs at resting and with movement and resolves spontaneously. \par \par 2. Central Hypothyroidism- She had an US of her thyroid on 2013 which showed subcentimeter nodules. F/U thyroid U/S in July 2015 stable in size and no nodules on thyroid US in 07/2016. FT4 1.1 in Dec 2014, 1.2 in July 2015 and in 07/2016 was 1.4. 4/2019 TSH 2.1 and free T4 1.0. No fatigue, some hair loss, nails are not brittle. Reports dry skin and  some cold intolerance. Reports intentional 15lb weight loss in last  year. \par \par 3. Osteopenia- DEXA scan done in 7/2015 stable- Fem Neck T-Score -1.5, Total Hip -0.2, Spine -1.1. 7/2017: Total Hip -0.4, lumbar Spine -1.3.  LMP at age 46. 25 OH D- 21.9 mg/dL in July 2015. currently on vitamin D 2000 IU q day. takes it in the afternoon with lunch.\par \par 4. Hypertriglycerides- trig 265, chol 179, HLD 34, LDL 92. Started on simvastatin per cardiology.

## 2019-07-12 NOTE — REVIEW OF SYSTEMS
[Dizziness] : dizziness [Fatigue] : no fatigue [Recent Weight Gain (___ Lbs)] : no recent weight gain [Recent Weight Loss (___ Lbs)] : no recent weight loss [Chills] : no chills [Fever] : no fever [Visual Field Defect] : no visual field defect [Blurry Vision] : no blurred vision [Dry Eyes] : no dryness of the eyes [Eyes Itch] : no itching of the eyes [Dysphagia] : no dysphagia [Dysphonia] : no dysphonia [Neck Pain] : no neck pain [Chest Pain] : no chest pain [Palpitations] : no palpitations [Leg Claudication] : no intermittent leg claudication [Lower Ext Edema] : no lower extremity edema [Shortness Of Breath] : no shortness of breath [Wheezing] : no wheezing was heard [Cough] : no cough [SOB on Exertion] : no shortness of breath during exertion [Orthopnea] : no orthopnea [PND] : no PND [Nausea] : no nausea [Vomiting] : no vomiting was observed [Constipation] : no constipation [Diarrhea] : no diarrhea [Abdominal Pain] : no abdominal pain [Heartburn] : no heartburn [Polyuria] : no polyuria [Dysuria] : no dysuria [Joint Pain] : no joint pain [Joint Stiffness] : no joint stiffness [Muscle Weakness] : no muscle weakness [Muscle Cramps] : no muscle cramps [Back Pain] : no back pain [Myalgia] : no myalgia  [Acne] : no acne [Acanthosis] : no acanthosis  [Hair Loss] : no hair loss [Dry Skin] : no dry skin [Headache] : no headaches [Tremors] : no tremors [Confusion] : no confusion [Pain/Numbness of Digits] : no pain/numbness of digits [Poor Balance] : steady state balance [Depression] : no depression [Anxiety] : no anxiety [Insomnia] : no insomnia [Stress] : no stress [Polydipsia] : no polydipsia [Easy Bleeding] : no ~M tendency for easy bleeding [Swelling] : no swelling [Lymphadenopathy] : no lymphadenopathy [All other systems negative] : All other systems negative

## 2019-07-12 NOTE — ASSESSMENT
[Carbohydrate Consistent Diet] : carbohydrate consistent diet [Levothyroxine] : The patient was instructed to take Levothyroxine on an empty stomach, separate from vitamins, and wait at least 30 minutes before eating [Importance of Diet and Exercise] : importance of diet and exercise to improve glycemic control, achieve weight loss and improve cardiovascular health [FreeTextEntry1] : 58 year old female presenting today for f/u on transient panhypopitutarism, central hypothyroidism, osteopenia, hypertriglyceridemia experiencing persistent dizziness and chest pain over last few months. \par \par Transient Panhypopitutarism\par -  AM cortisol appropriate in June 2019, TSH and Free T4 wnl \par - Minimal symptoms of central hypothyroidism\par - So signs of DI or hypocortisolism\par - Due to persistent dizziness and cardiac etiology ruled out and endocrine labs wnl, patient instructed to follow up with neurologist \par \par Central Hypothyroidism\par - Clinically euthyroid ( 4/2019 TSH 2.1, T4 1.0)\par - Synthroid discontinued last year, will continue to monitor off Synthroid \par \par \par HLD and Hypertriglyceridemia\par - Trig 265, Chol 179, HDL 34, LDL 92\par - Switched to simvastatin 10mg qhs per cardiologist \par \par Osteopenia\par - Last DEXA scan 2017, stable \par - Will consider repeat next year \par \par RTC in 12 months\par \par D/w Dr. Maldonado\par \par Alysha Lawson \par Endocrine Fellow

## 2019-07-17 ENCOUNTER — APPOINTMENT (OUTPATIENT)
Dept: OPHTHALMOLOGY | Facility: CLINIC | Age: 59
End: 2019-07-17

## 2019-08-08 ENCOUNTER — OUTPATIENT (OUTPATIENT)
Dept: OUTPATIENT SERVICES | Facility: HOSPITAL | Age: 59
LOS: 1 days | End: 2019-08-08
Payer: SELF-PAY

## 2019-08-08 ENCOUNTER — APPOINTMENT (OUTPATIENT)
Dept: NEUROLOGY | Facility: HOSPITAL | Age: 59
End: 2019-08-08

## 2019-08-08 VITALS
HEIGHT: 60 IN | SYSTOLIC BLOOD PRESSURE: 109 MMHG | BODY MASS INDEX: 25.54 KG/M2 | HEART RATE: 88 BPM | WEIGHT: 130.07 LBS | RESPIRATION RATE: 14 BRPM | DIASTOLIC BLOOD PRESSURE: 75 MMHG

## 2019-08-08 DIAGNOSIS — R42 DIZZINESS AND GIDDINESS: ICD-10-CM

## 2019-08-08 DIAGNOSIS — R51 HEADACHE: ICD-10-CM

## 2019-08-08 DIAGNOSIS — D35.2 BENIGN NEOPLASM OF PITUITARY GLAND: Chronic | ICD-10-CM

## 2019-08-08 PROCEDURE — G0463: CPT

## 2019-08-08 NOTE — HISTORY OF PRESENT ILLNESS
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 Pt referred for follow up by Endocrine after an episode of dizziness associated with CP and SOB in late June, with negative cardiac w/u (including stress test). Currently pt notes that symptoms have come back to baseline over the past month and a half. Pt was last seen in 2017 and was on Amitriptyline with good response, but has since stopped taking it with a sustained improvement in HAs. Pt had also been tried on Indocin and Topamax.\par \par Previously headaches, always right sided sometimes with tearing and injection of R eye, each episode 3-4 hours. 3-4 days/weak, 15-16 days/month, not able to sleep well at night. Now same semiology, but only 1-2/week and less severe, though with continued dizziness that is just as severe.\par \par Interpretor ID: [id number]

## 2019-08-08 NOTE — ASSESSMENT
[FreeTextEntry1] : Pt is a 59 yo F with a PMH of HLD, Rafke Cleft Cyst (s/p resection in 2012) with resultant Panhypopituitarism (currently off Synthroid) and hemicranial headaches, returned for follow up on HAs and dizziness that is now improved from prior visit in 2017.\par \par \par Plan:\par - Continue off Amitriptyline for now and reassess (pt currently taking Tylenol 1-2/week and happy)\par - Start Magnesium Oxide 400mg BID for HA and Dizziness\par - Vestibular Rehab referral\par - Advised to keep hydrated\par - RTC in 4 months

## 2019-08-08 NOTE — PHYSICAL EXAM
[General Appearance - Alert] : alert [General Appearance - In No Acute Distress] : in no acute distress [General Appearance - Well Nourished] : well nourished [General Appearance - Well Developed] : well developed [General Appearance - Well-Appearing] : healthy appearing [Oriented To Time, Place, And Person] : oriented to person, place, and time [Impaired Insight] : insight and judgment were intact [Affect] : the affect was normal [Mood] : the mood was normal [Memory Recent] : recent memory was not impaired [Memory Remote] : remote memory was not impaired [Cranial Nerves Optic (II)] : visual acuity intact bilaterally,  visual fields full to confrontation, pupils equal round and reactive to light [Cranial Nerves Oculomotor (III)] : extraocular motion intact [Cranial Nerves Trigeminal (V)] : facial sensation intact symmetrically [Cranial Nerves Facial (VII)] : face symmetrical [Cranial Nerves Vestibulocochlear (VIII)] : hearing was intact bilaterally [Cranial Nerves Glossopharyngeal (IX)] : tongue and palate midline [Cranial Nerves Accessory (XI - Cranial And Spinal)] : head turning and shoulder shrug symmetric [Cranial Nerves Hypoglossal (XII)] : there was no tongue deviation with protrusion [Motor Tone] : muscle tone was normal in all four extremities [Motor Strength] : muscle strength was normal in all four extremities [Involuntary Movements] : no involuntary movements were seen [No Muscle Atrophy] : normal bulk in all four extremities [Sensation Tactile Decrease] : light touch was intact [Proprioception] : proprioception was intact [Abnormal Walk] : normal gait [Balance] : balance was intact [2+] : Brachioradialis left 2+ [1+] : Patella left 1+ [0] : Ankle jerk left 0 [Sclera] : the sclera and conjunctiva were normal [PERRL With Normal Accommodation] : pupils were equal in size, round, reactive to light, with normal accommodation [Extraocular Movements] : extraocular movements were intact [Full Visual Field] : full visual field [Outer Ear] : the ears and nose were normal in appearance [Hearing Threshold Finger Rub Not Sunflower] : hearing was normal [Neck Appearance] : the appearance of the neck was normal [] : no respiratory distress [Exaggerated Use Of Accessory Muscles For Inspiration] : no accessory muscle use [Limited Balance] : balance was intact [Past-pointing] : there was no past-pointing [Tremor] : no tremor present [Dysdiadochokinesia Bilaterally] : not present [Coordination - Dysmetria Impaired Heel-to-Shin Bilateral] : not present [Coordination - Dysmetria Impaired Finger-to-Nose Bilateral] : not present [Plantar Reflex Left Only] : normal on the left [Plantar Reflex Right Only] : normal on the right [___] : absent on the right [___] : absent on the left

## 2019-08-26 ENCOUNTER — EMERGENCY (EMERGENCY)
Facility: HOSPITAL | Age: 59
LOS: 1 days | Discharge: HOME | End: 2019-08-26
Attending: EMERGENCY MEDICINE
Payer: MEDICAID

## 2019-08-26 VITALS
HEART RATE: 89 BPM | RESPIRATION RATE: 16 BRPM | WEIGHT: 123.02 LBS | SYSTOLIC BLOOD PRESSURE: 118 MMHG | OXYGEN SATURATION: 100 % | DIASTOLIC BLOOD PRESSURE: 75 MMHG | HEIGHT: 59 IN | TEMPERATURE: 98 F

## 2019-08-26 VITALS
HEART RATE: 69 BPM | SYSTOLIC BLOOD PRESSURE: 100 MMHG | TEMPERATURE: 98 F | DIASTOLIC BLOOD PRESSURE: 68 MMHG | RESPIRATION RATE: 16 BRPM | OXYGEN SATURATION: 99 %

## 2019-08-26 DIAGNOSIS — D35.2 BENIGN NEOPLASM OF PITUITARY GLAND: Chronic | ICD-10-CM

## 2019-08-26 LAB
ALBUMIN SERPL ELPH-MCNC: 4.4 G/DL — SIGNIFICANT CHANGE UP (ref 3.3–5)
ALP SERPL-CCNC: 66 U/L — SIGNIFICANT CHANGE UP (ref 40–120)
ALT FLD-CCNC: <5 U/L — LOW (ref 10–45)
ANION GAP SERPL CALC-SCNC: 12 MMOL/L — SIGNIFICANT CHANGE UP (ref 5–17)
ANION GAP SERPL CALC-SCNC: 9 MMOL/L — SIGNIFICANT CHANGE UP (ref 5–17)
APPEARANCE UR: CLEAR — SIGNIFICANT CHANGE UP
AST SERPL-CCNC: 42 U/L — HIGH (ref 10–40)
BACTERIA # UR AUTO: NEGATIVE — SIGNIFICANT CHANGE UP
BASOPHILS # BLD AUTO: 0 K/UL — SIGNIFICANT CHANGE UP (ref 0–0.2)
BASOPHILS NFR BLD AUTO: 0.2 % — SIGNIFICANT CHANGE UP (ref 0–2)
BILIRUB SERPL-MCNC: 0.3 MG/DL — SIGNIFICANT CHANGE UP (ref 0.2–1.2)
BILIRUB UR-MCNC: NEGATIVE — SIGNIFICANT CHANGE UP
BUN SERPL-MCNC: 10 MG/DL — SIGNIFICANT CHANGE UP (ref 7–23)
BUN SERPL-MCNC: 8 MG/DL — SIGNIFICANT CHANGE UP (ref 7–23)
CALCIUM SERPL-MCNC: 8.5 MG/DL — SIGNIFICANT CHANGE UP (ref 8.4–10.5)
CALCIUM SERPL-MCNC: 9.8 MG/DL — SIGNIFICANT CHANGE UP (ref 8.4–10.5)
CHLORIDE SERPL-SCNC: 100 MMOL/L — SIGNIFICANT CHANGE UP (ref 96–108)
CHLORIDE SERPL-SCNC: 105 MMOL/L — SIGNIFICANT CHANGE UP (ref 96–108)
CO2 SERPL-SCNC: 26 MMOL/L — SIGNIFICANT CHANGE UP (ref 22–31)
CO2 SERPL-SCNC: 29 MMOL/L — SIGNIFICANT CHANGE UP (ref 22–31)
COLOR SPEC: COLORLESS — SIGNIFICANT CHANGE UP
CREAT SERPL-MCNC: 0.47 MG/DL — LOW (ref 0.5–1.3)
CREAT SERPL-MCNC: 0.53 MG/DL — SIGNIFICANT CHANGE UP (ref 0.5–1.3)
DIFF PNL FLD: ABNORMAL
EOSINOPHIL # BLD AUTO: 0.1 K/UL — SIGNIFICANT CHANGE UP (ref 0–0.5)
EOSINOPHIL NFR BLD AUTO: 1.8 % — SIGNIFICANT CHANGE UP (ref 0–6)
EPI CELLS # UR: 0 /HPF — SIGNIFICANT CHANGE UP
GAS PNL BLDV: SIGNIFICANT CHANGE UP
GLUCOSE SERPL-MCNC: 110 MG/DL — HIGH (ref 70–99)
GLUCOSE SERPL-MCNC: 90 MG/DL — SIGNIFICANT CHANGE UP (ref 70–99)
GLUCOSE UR QL: NEGATIVE — SIGNIFICANT CHANGE UP
HCT VFR BLD CALC: 41.2 % — SIGNIFICANT CHANGE UP (ref 34.5–45)
HGB BLD-MCNC: 13.7 G/DL — SIGNIFICANT CHANGE UP (ref 11.5–15.5)
HYALINE CASTS # UR AUTO: 0 /LPF — SIGNIFICANT CHANGE UP (ref 0–2)
KETONES UR-MCNC: NEGATIVE — SIGNIFICANT CHANGE UP
LACTATE BLDV-MCNC: 0.9 MMOL/L — SIGNIFICANT CHANGE UP (ref 0.7–2)
LEUKOCYTE ESTERASE UR-ACNC: NEGATIVE — SIGNIFICANT CHANGE UP
LYMPHOCYTES # BLD AUTO: 2.1 K/UL — SIGNIFICANT CHANGE UP (ref 1–3.3)
LYMPHOCYTES # BLD AUTO: 31.1 % — SIGNIFICANT CHANGE UP (ref 13–44)
MCHC RBC-ENTMCNC: 29.2 PG — SIGNIFICANT CHANGE UP (ref 27–34)
MCHC RBC-ENTMCNC: 33.2 GM/DL — SIGNIFICANT CHANGE UP (ref 32–36)
MCV RBC AUTO: 87.9 FL — SIGNIFICANT CHANGE UP (ref 80–100)
MONOCYTES # BLD AUTO: 0.5 K/UL — SIGNIFICANT CHANGE UP (ref 0–0.9)
MONOCYTES NFR BLD AUTO: 6.6 % — SIGNIFICANT CHANGE UP (ref 2–14)
NEUTROPHILS # BLD AUTO: 4.2 K/UL — SIGNIFICANT CHANGE UP (ref 1.8–7.4)
NEUTROPHILS NFR BLD AUTO: 60.3 % — SIGNIFICANT CHANGE UP (ref 43–77)
NITRITE UR-MCNC: NEGATIVE — SIGNIFICANT CHANGE UP
PH UR: 7.5 — SIGNIFICANT CHANGE UP (ref 5–8)
PLATELET # BLD AUTO: 250 K/UL — SIGNIFICANT CHANGE UP (ref 150–400)
POTASSIUM SERPL-MCNC: 3.7 MMOL/L — SIGNIFICANT CHANGE UP (ref 3.5–5.3)
POTASSIUM SERPL-MCNC: 6.4 MMOL/L — CRITICAL HIGH (ref 3.5–5.3)
POTASSIUM SERPL-SCNC: 3.7 MMOL/L — SIGNIFICANT CHANGE UP (ref 3.5–5.3)
POTASSIUM SERPL-SCNC: 6.4 MMOL/L — CRITICAL HIGH (ref 3.5–5.3)
PROT SERPL-MCNC: 7.7 G/DL — SIGNIFICANT CHANGE UP (ref 6–8.3)
PROT UR-MCNC: NEGATIVE — SIGNIFICANT CHANGE UP
RBC # BLD: 4.69 M/UL — SIGNIFICANT CHANGE UP (ref 3.8–5.2)
RBC # FLD: 11.5 % — SIGNIFICANT CHANGE UP (ref 10.3–14.5)
RBC CASTS # UR COMP ASSIST: 0 /HPF — SIGNIFICANT CHANGE UP (ref 0–4)
SODIUM SERPL-SCNC: 140 MMOL/L — SIGNIFICANT CHANGE UP (ref 135–145)
SODIUM SERPL-SCNC: 141 MMOL/L — SIGNIFICANT CHANGE UP (ref 135–145)
SP GR SPEC: 1.01 — LOW (ref 1.01–1.02)
UROBILINOGEN FLD QL: NEGATIVE — SIGNIFICANT CHANGE UP
WBC # BLD: 6.9 K/UL — SIGNIFICANT CHANGE UP (ref 3.8–10.5)
WBC # FLD AUTO: 6.9 K/UL — SIGNIFICANT CHANGE UP (ref 3.8–10.5)
WBC UR QL: 0 /HPF — SIGNIFICANT CHANGE UP (ref 0–5)

## 2019-08-26 PROCEDURE — 85027 COMPLETE CBC AUTOMATED: CPT

## 2019-08-26 PROCEDURE — 82803 BLOOD GASES ANY COMBINATION: CPT

## 2019-08-26 PROCEDURE — 99284 EMERGENCY DEPT VISIT MOD MDM: CPT

## 2019-08-26 PROCEDURE — 81001 URINALYSIS AUTO W/SCOPE: CPT

## 2019-08-26 PROCEDURE — 84295 ASSAY OF SERUM SODIUM: CPT

## 2019-08-26 PROCEDURE — 80048 BASIC METABOLIC PNL TOTAL CA: CPT

## 2019-08-26 PROCEDURE — 80053 COMPREHEN METABOLIC PANEL: CPT

## 2019-08-26 PROCEDURE — 87040 BLOOD CULTURE FOR BACTERIA: CPT

## 2019-08-26 PROCEDURE — 84132 ASSAY OF SERUM POTASSIUM: CPT

## 2019-08-26 PROCEDURE — 83605 ASSAY OF LACTIC ACID: CPT

## 2019-08-26 PROCEDURE — 99284 EMERGENCY DEPT VISIT MOD MDM: CPT | Mod: 25

## 2019-08-26 PROCEDURE — 87086 URINE CULTURE/COLONY COUNT: CPT

## 2019-08-26 PROCEDURE — 82330 ASSAY OF CALCIUM: CPT

## 2019-08-26 PROCEDURE — 74177 CT ABD & PELVIS W/CONTRAST: CPT | Mod: 26

## 2019-08-26 PROCEDURE — 82435 ASSAY OF BLOOD CHLORIDE: CPT

## 2019-08-26 PROCEDURE — 85014 HEMATOCRIT: CPT

## 2019-08-26 PROCEDURE — 96374 THER/PROPH/DIAG INJ IV PUSH: CPT | Mod: XU

## 2019-08-26 PROCEDURE — 74177 CT ABD & PELVIS W/CONTRAST: CPT

## 2019-08-26 PROCEDURE — 96375 TX/PRO/DX INJ NEW DRUG ADDON: CPT

## 2019-08-26 PROCEDURE — 82947 ASSAY GLUCOSE BLOOD QUANT: CPT

## 2019-08-26 RX ORDER — FAMOTIDINE 10 MG/ML
20 INJECTION INTRAVENOUS ONCE
Refills: 0 | Status: COMPLETED | OUTPATIENT
Start: 2019-08-26 | End: 2019-08-26

## 2019-08-26 RX ORDER — KETOROLAC TROMETHAMINE 30 MG/ML
30 SYRINGE (ML) INJECTION ONCE
Refills: 0 | Status: DISCONTINUED | OUTPATIENT
Start: 2019-08-26 | End: 2019-08-26

## 2019-08-26 RX ORDER — ACETAMINOPHEN 500 MG
975 TABLET ORAL ONCE
Refills: 0 | Status: COMPLETED | OUTPATIENT
Start: 2019-08-26 | End: 2019-08-26

## 2019-08-26 RX ORDER — CEFTRIAXONE 500 MG/1
1000 INJECTION, POWDER, FOR SOLUTION INTRAMUSCULAR; INTRAVENOUS ONCE
Refills: 0 | Status: DISCONTINUED | OUTPATIENT
Start: 2019-08-26 | End: 2019-08-26

## 2019-08-26 RX ORDER — ONDANSETRON 8 MG/1
4 TABLET, FILM COATED ORAL ONCE
Refills: 0 | Status: COMPLETED | OUTPATIENT
Start: 2019-08-26 | End: 2019-08-26

## 2019-08-26 RX ORDER — SODIUM CHLORIDE 9 MG/ML
1000 INJECTION INTRAMUSCULAR; INTRAVENOUS; SUBCUTANEOUS ONCE
Refills: 0 | Status: COMPLETED | OUTPATIENT
Start: 2019-08-26 | End: 2019-08-26

## 2019-08-26 RX ADMIN — FAMOTIDINE 20 MILLIGRAM(S): 10 INJECTION INTRAVENOUS at 16:51

## 2019-08-26 RX ADMIN — ONDANSETRON 4 MILLIGRAM(S): 8 TABLET, FILM COATED ORAL at 16:51

## 2019-08-26 RX ADMIN — SODIUM CHLORIDE 1000 MILLILITER(S): 9 INJECTION INTRAMUSCULAR; INTRAVENOUS; SUBCUTANEOUS at 16:32

## 2019-08-26 RX ADMIN — Medication 975 MILLIGRAM(S): at 16:52

## 2019-08-26 NOTE — ED ADULT NURSE NOTE - CHPI ED NUR SYMPTOMS NEG
no vomiting/no fever/no burning urination/no chills/no hematuria/no blood in stool/no dysuria/no diarrhea/no abdominal distension

## 2019-08-26 NOTE — ED ADULT NURSE NOTE - OBJECTIVE STATEMENT
57 y/o female history of hypothyroidism & hypercholesteremia presents to the ED from home c/o right sided lower abdominal pain . Patient states that she has had a sharp stabbing pain in her right lower quadrant since yesterday. Patient states it radiates to the right flank. Patient states that she has been feeling nauseous and has been constipated since yesterday. States that the pain is constant and rates it at 6/10. Denies blood in stool/urine.  Denies fever, chills, vomiting, weakness, diarrhea numbness/tingling, urinary s/s. Patient A&Ox3, in no respiratory distress, no chest pain. Strong peripheral pulses. Tender to palpation of the right lower quadrant. Abdomen soft and non distended. Patient ambulated in ED.

## 2019-08-26 NOTE — ED ADULT NURSE REASSESSMENT NOTE - NS ED NURSE REASSESS COMMENT FT1
Pt returned from CT scan. Blood work taken and sent to lab. PT resting quietly on stretcher reporting no pain at this time. Awaiting CT scan and blood results. Safety and comfort measures provided and maintained.

## 2019-08-26 NOTE — ED PROVIDER NOTE - PROGRESS NOTE DETAILS
Pt stable. K on VBG high, will await CMP Duarte DRIVER: Received signout on patient. Pt feeling well. No pain now. Labs reassuring on repeat. CT scan non focal. Will dc home

## 2019-08-26 NOTE — ED ADULT NURSE NOTE - NSIMPLEMENTINTERV_GEN_ALL_ED
Implemented All Universal Safety Interventions:  [place] to call system. Call bell, personal items and telephone within reach. Instruct patient to call for assistance. Room bathroom lighting operational. Non-slip footwear when patient is off stretcher. Physically safe environment: no spills, clutter or unnecessary equipment. Stretcher in lowest position, wheels locked, appropriate side rails in place.

## 2019-08-26 NOTE — ED PROVIDER NOTE - CLINICAL SUMMARY MEDICAL DECISION MAKING FREE TEXT BOX
Attending note (Oliver): R sided abd pain; appendicitis, diverticulitis, vs  (stone vs pyelo); no significant RUQ tenderness unlikely biliary etiology; will check labs: cbc, cmp, ua, vbg/lactate, and trial medications w/ iv fluids: tylenol, pepcid, zofran.  Obtain CT AP for further evaluation.

## 2019-08-26 NOTE — ED ADULT NURSE REASSESSMENT NOTE - NS ED NURSE REASSESS COMMENT FT1
Report received from GAY Adams. Upon reassessment pt at CT scan. Will reassess and take blood work upon return to [facility].

## 2019-08-26 NOTE — ED PROVIDER NOTE - PROVIDER TOKENS
FREE:[LAST:[[facility]],FIRST:[Greg],PHONE:[phone]],FAX:[phone]   [phone]    -],ADDRESS:[[address] [place] [address]],FOLLOWUP:[1-3 Days]]

## 2019-08-26 NOTE — ED PROVIDER NOTE - NSFOLLOWUPINSTRUCTIONS_ED_ALL_ED_FT
1) YOu were seen in the ED for abdominal pain. You had laboratory studies and a CT scan. Your labs were normal. Your CT scan of your abdomen did not show a reason for your abdominal pain.   2) You can alternate tylenol and ibuprofen every 3 hours for pain control. You should follow up with your gastroenterologist this week.  3) Please follow up with your PMD in the next 24-48hrs.  4) Please return to the ED if you have any new or concerning symptoms.

## 2019-08-26 NOTE — ED PROVIDER NOTE - CARE PROVIDER_API CALL
Greg Burgos  [address] [place] [id number]  Phone: [phone]  Fax: (   )    -  Follow Up Time: 1-3 Days

## 2019-08-26 NOTE — ED PROVIDER NOTE - OBJECTIVE STATEMENT
Attending note (Oliver): 57 y/o F c/o right sided abdominal pain since saturday, and worsening since last night; constant, pain radiating from right side to the right back/flank; +nausea; +chills.  No vomiting.  No urinary symptoms.  Pain is similar to episode of diverticulitis last year.  Tmax yesterday 100.0F.    PMH: none  Meds: none  Allergies: morphine (rash palpitations and hot sensation)  PSH: pituitary tumor resection

## 2019-08-28 LAB
CULTURE RESULTS: NO GROWTH — SIGNIFICANT CHANGE UP
SPECIMEN SOURCE: SIGNIFICANT CHANGE UP

## 2019-08-31 LAB
CULTURE RESULTS: SIGNIFICANT CHANGE UP
CULTURE RESULTS: SIGNIFICANT CHANGE UP
SPECIMEN SOURCE: SIGNIFICANT CHANGE UP
SPECIMEN SOURCE: SIGNIFICANT CHANGE UP

## 2019-09-04 ENCOUNTER — OUTPATIENT (OUTPATIENT)
Dept: OUTPATIENT SERVICES | Facility: HOSPITAL | Age: 59
LOS: 1 days | End: 2019-09-04
Payer: SELF-PAY

## 2019-09-04 ENCOUNTER — APPOINTMENT (OUTPATIENT)
Dept: CARDIOLOGY | Facility: HOSPITAL | Age: 59
End: 2019-09-04

## 2019-09-04 VITALS
DIASTOLIC BLOOD PRESSURE: 73 MMHG | HEIGHT: 60 IN | SYSTOLIC BLOOD PRESSURE: 107 MMHG | BODY MASS INDEX: 24.54 KG/M2 | WEIGHT: 125 LBS | OXYGEN SATURATION: 98 % | HEART RATE: 73 BPM

## 2019-09-04 DIAGNOSIS — I25.10 ATHEROSCLEROTIC HEART DISEASE OF NATIVE CORONARY ARTERY WITHOUT ANGINA PECTORIS: ICD-10-CM

## 2019-09-04 DIAGNOSIS — D35.2 BENIGN NEOPLASM OF PITUITARY GLAND: Chronic | ICD-10-CM

## 2019-09-04 PROCEDURE — 93005 ELECTROCARDIOGRAM TRACING: CPT

## 2019-09-04 PROCEDURE — G0463: CPT

## 2019-09-04 RX ORDER — SIMVASTATIN 10 MG/1
10 TABLET, FILM COATED ORAL DAILY
Qty: 30 | Refills: 1 | Status: COMPLETED | COMMUNITY
Start: 2019-04-29 | End: 2019-09-04

## 2019-09-04 NOTE — HISTORY OF PRESENT ILLNESS
[FreeTextEntry1] : 59 y/o F w/ hx of HLD here for follow up.  Initial consultation for intermittent chest pain, extensive workup leading to LHC in 4/2019 which was normal.  Pt last seen in 5/2019, has been doing well since then.  Pt states b/l shoulder and LE pain, dizziness has essentially resolved over the last few months.  Pt has no other active complaints.  Denies CP, fevers, chills, n/v, palpitations, Gardner, LE edema.

## 2019-09-04 NOTE — PHYSICAL EXAM
[General Appearance - Well Developed] : well developed [Normal Appearance] : normal appearance [Well Groomed] : well groomed [General Appearance - Well Nourished] : well nourished [No Deformities] : no deformities [General Appearance - In No Acute Distress] : no acute distress [Normal Conjunctiva] : the conjunctiva exhibited no abnormalities [Eyelids - No Xanthelasma] : the eyelids demonstrated no xanthelasmas [Respiration, Rhythm And Depth] : normal respiratory rhythm and effort [Exaggerated Use Of Accessory Muscles For Inspiration] : no accessory muscle use [Auscultation Breath Sounds / Voice Sounds] : lungs were clear to auscultation bilaterally [Heart Rate And Rhythm] : heart rate and rhythm were normal [Heart Sounds] : normal S1 and S2 [Murmurs] : no murmurs present [Abdomen Soft] : soft [Abdomen Tenderness] : non-tender [Abdomen Mass (___ Cm)] : no abdominal mass palpated [Abnormal Walk] : normal gait [Gait - Sufficient For Exercise Testing] : the gait was sufficient for exercise testing [Nail Clubbing] : no clubbing of the fingernails [Cyanosis, Localized] : no localized cyanosis [Petechial Hemorrhages (___cm)] : no petechial hemorrhages [Skin Color & Pigmentation] : normal skin color and pigmentation [] : no rash [No Venous Stasis] : no venous stasis [Skin Lesions] : no skin lesions [No Skin Ulcers] : no skin ulcer [No Xanthoma] : no  xanthoma was observed

## 2019-09-04 NOTE — ASSESSMENT
[FreeTextEntry1] : 59 y/o F w/ hx of HLD here for follow up.\par \par #HLD\par -ASCVD risk 3.1%\par -will d/c statin given no CAD, tolerance issues, and low ASCVD\par -will recheck lipid panel in 3/2020 at next appt\par \par #health care maintenance\par -will d/c ASA as pt low-risk and has no CAD\par \par Discussed w/ Dr. Torrez\par \par Stephan Manzo, PGY4\par

## 2019-09-05 DIAGNOSIS — E78.1 PURE HYPERGLYCERIDEMIA: ICD-10-CM

## 2019-09-11 ENCOUNTER — APPOINTMENT (OUTPATIENT)
Dept: OPHTHALMOLOGY | Facility: CLINIC | Age: 59
End: 2019-09-11

## 2019-10-02 ENCOUNTER — OUTPATIENT (OUTPATIENT)
Dept: OUTPATIENT SERVICES | Facility: HOSPITAL | Age: 59
LOS: 1 days | End: 2019-10-02
Payer: COMMERCIAL

## 2019-10-02 ENCOUNTER — APPOINTMENT (OUTPATIENT)
Dept: FAMILY MEDICINE | Facility: HOSPITAL | Age: 59
End: 2019-10-02

## 2019-10-02 ENCOUNTER — OUTPATIENT (OUTPATIENT)
Dept: OUTPATIENT SERVICES | Facility: HOSPITAL | Age: 59
LOS: 1 days | End: 2019-10-02
Payer: SELF-PAY

## 2019-10-02 ENCOUNTER — MED ADMIN CHARGE (OUTPATIENT)
Age: 59
End: 2019-10-02

## 2019-10-02 VITALS
BODY MASS INDEX: 24.41 KG/M2 | TEMPERATURE: 98.3 F | OXYGEN SATURATION: 98 % | HEART RATE: 72 BPM | DIASTOLIC BLOOD PRESSURE: 70 MMHG | WEIGHT: 125 LBS | RESPIRATION RATE: 15 BRPM | SYSTOLIC BLOOD PRESSURE: 108 MMHG

## 2019-10-02 DIAGNOSIS — D35.2 BENIGN NEOPLASM OF PITUITARY GLAND: Chronic | ICD-10-CM

## 2019-10-02 DIAGNOSIS — Z00.00 ENCOUNTER FOR GENERAL ADULT MEDICAL EXAMINATION WITHOUT ABNORMAL FINDINGS: ICD-10-CM

## 2019-10-02 PROCEDURE — G0463: CPT

## 2019-10-02 PROCEDURE — G0008: CPT

## 2019-10-02 NOTE — PHYSICAL EXAM
[No Acute Distress] : no acute distress [Well Nourished] : well nourished [Normal Sclera/Conjunctiva] : normal sclera/conjunctiva [No Respiratory Distress] : no respiratory distress  [PERRL] : pupils equal round and reactive to light [No Accessory Muscle Use] : no accessory muscle use [Clear to Auscultation] : lungs were clear to auscultation bilaterally [Normal Rate] : normal rate  [Normal S1, S2] : normal S1 and S2 [Regular Rhythm] : with a regular rhythm [No Murmur] : no murmur heard [Soft] : abdomen soft [Non Tender] : non-tender [Non-distended] : non-distended [Grossly Normal Strength/Tone] : grossly normal strength/tone [Coordination Grossly Intact] : coordination grossly intact [No Focal Deficits] : no focal deficits [Normal Gait] : normal gait [Normal Affect] : the affect was normal [Normal Insight/Judgement] : insight and judgment were intact [Examination Of The Breasts] : a normal appearance [Normal] : normal [No Discharge] : no discharge [No Masses] : no breast masses were palpable [Axillary Lymph Nodes Enlarged Bilaterally] : no enlarged nodes

## 2019-10-02 NOTE — HISTORY OF PRESENT ILLNESS
[FreeTextEntry8] : 58f here for CSP for mammogram. Patient is up to date for colon cancer screening and cervical cancer screening

## 2019-10-02 NOTE — ASSESSMENT
[FreeTextEntry1] : CSP visit\par -pap and hpv in 2017 negative\par -colonoscopy in 2017 recommend 2-5yr follow up\par -Mammogram referral given today\par \par Discussed w/ Dr. Morfin

## 2019-10-02 NOTE — PHYSICAL EXAM
[No Acute Distress] : no acute distress [Well Nourished] : well nourished [Normal Sclera/Conjunctiva] : normal sclera/conjunctiva [PERRL] : pupils equal round and reactive to light [No Respiratory Distress] : no respiratory distress  [No Accessory Muscle Use] : no accessory muscle use [Clear to Auscultation] : lungs were clear to auscultation bilaterally [Normal Rate] : normal rate  [Normal S1, S2] : normal S1 and S2 [Regular Rhythm] : with a regular rhythm [No Murmur] : no murmur heard [Soft] : abdomen soft [Non Tender] : non-tender [Non-distended] : non-distended [Grossly Normal Strength/Tone] : grossly normal strength/tone [Coordination Grossly Intact] : coordination grossly intact [No Focal Deficits] : no focal deficits [Normal Gait] : normal gait [Normal Affect] : the affect was normal [Normal Insight/Judgement] : insight and judgment were intact [Normal] : normal [Examination Of The Breasts] : a normal appearance [No Discharge] : no discharge [No Masses] : no breast masses were palpable [Axillary Lymph Nodes Enlarged Bilaterally] : no enlarged nodes

## 2019-10-03 DIAGNOSIS — Z23 ENCOUNTER FOR IMMUNIZATION: ICD-10-CM

## 2019-10-07 ENCOUNTER — FORM ENCOUNTER (OUTPATIENT)
Age: 59
End: 2019-10-07

## 2019-10-08 ENCOUNTER — APPOINTMENT (OUTPATIENT)
Dept: MAMMOGRAPHY | Facility: HOSPITAL | Age: 59
End: 2019-10-08
Payer: SELF-PAY

## 2019-10-08 ENCOUNTER — OUTPATIENT (OUTPATIENT)
Dept: OUTPATIENT SERVICES | Facility: HOSPITAL | Age: 59
LOS: 1 days | End: 2019-10-08
Payer: COMMERCIAL

## 2019-10-08 DIAGNOSIS — D35.2 BENIGN NEOPLASM OF PITUITARY GLAND: Chronic | ICD-10-CM

## 2019-10-08 DIAGNOSIS — Z00.8 ENCOUNTER FOR OTHER GENERAL EXAMINATION: ICD-10-CM

## 2019-10-08 PROCEDURE — 77067 SCR MAMMO BI INCL CAD: CPT

## 2019-10-08 PROCEDURE — 77063 BREAST TOMOSYNTHESIS BI: CPT | Mod: 26

## 2019-10-08 PROCEDURE — 77063 BREAST TOMOSYNTHESIS BI: CPT

## 2019-10-08 PROCEDURE — 77067 SCR MAMMO BI INCL CAD: CPT | Mod: 26

## 2019-10-22 ENCOUNTER — OUTPATIENT (OUTPATIENT)
Dept: OUTPATIENT SERVICES | Facility: HOSPITAL | Age: 59
LOS: 1 days | End: 2019-10-22
Payer: SELF-PAY

## 2019-10-22 ENCOUNTER — APPOINTMENT (OUTPATIENT)
Dept: FAMILY MEDICINE | Facility: HOSPITAL | Age: 59
End: 2019-10-22

## 2019-10-22 VITALS
OXYGEN SATURATION: 100 % | SYSTOLIC BLOOD PRESSURE: 119 MMHG | HEART RATE: 83 BPM | BODY MASS INDEX: 24.61 KG/M2 | RESPIRATION RATE: 16 BRPM | TEMPERATURE: 97.9 F | DIASTOLIC BLOOD PRESSURE: 78 MMHG | WEIGHT: 126 LBS

## 2019-10-22 DIAGNOSIS — E78.1 PURE HYPERGLYCERIDEMIA: ICD-10-CM

## 2019-10-22 DIAGNOSIS — D35.2 BENIGN NEOPLASM OF PITUITARY GLAND: Chronic | ICD-10-CM

## 2019-10-22 DIAGNOSIS — Z87.898 PERSONAL HISTORY OF OTHER SPECIFIED CONDITIONS: ICD-10-CM

## 2019-10-22 DIAGNOSIS — Z00.00 ENCOUNTER FOR GENERAL ADULT MEDICAL EXAMINATION WITHOUT ABNORMAL FINDINGS: ICD-10-CM

## 2019-10-22 DIAGNOSIS — E78.5 HYPERLIPIDEMIA, UNSPECIFIED: ICD-10-CM

## 2019-10-22 PROCEDURE — G0463: CPT

## 2019-10-23 DIAGNOSIS — L23.9 ALLERGIC CONTACT DERMATITIS, UNSPECIFIED CAUSE: ICD-10-CM

## 2019-10-23 NOTE — REVIEW OF SYSTEMS
[Itching] : Itching [Skin Rash] : skin rash [Fever] : no fever [Chills] : no chills [Discharge] : no discharge [Pain] : no pain [Redness] : no redness [Dryness] : no dryness  [Nasal Discharge] : no nasal discharge [Sore Throat] : no sore throat [Postnasal Drip] : no postnasal drip [Chest Pain] : no chest pain [Palpitations] : no palpitations [Shortness Of Breath] : no shortness of breath [Wheezing] : no wheezing [Cough] : no cough [Nausea] : no nausea [Vomiting] : no vomiting [Headache] : no headache [Insomnia] : no insomnia [Anxiety] : no anxiety

## 2019-10-23 NOTE — PHYSICAL EXAM
[No Acute Distress] : no acute distress [Well Nourished] : well nourished [Normal Sclera/Conjunctiva] : normal sclera/conjunctiva [Normal Oropharynx] : the oropharynx was normal [No Respiratory Distress] : no respiratory distress  [No Accessory Muscle Use] : no accessory muscle use [Clear to Auscultation] : lungs were clear to auscultation bilaterally [Normal Rate] : normal rate  [Regular Rhythm] : with a regular rhythm [Normal S1, S2] : normal S1 and S2 [No Murmur] : no murmur heard [Examination Of The Hair] : texture and distribution of hair was normal [FreeTextEntry2] : maculopapular rash over forehead with inflammation, contiguous erythematous rash, spread over frontal area as well as slight nasal bridge involvement. Patient without swelling amy-orbitally, no wheals or amy-oral inflammation+

## 2019-10-23 NOTE — HISTORY OF PRESENT ILLNESS
[Rash (Location) ___] : rash on [unfilled] [Mild] : mild [___ Weeks ago] :  [unfilled] weeks ago [Constant] : constant [Stable] : stable [FreeTextEntry1] : once in the past in [place] [FreeTextEntry2] : Burning/itching. Admits using Neosporin on her forehead preceding rash. [FreeTextEntry5] : Nothing [FreeTextEntry4] : Nothing admittedly [FreeTextEntry8] : 59F presents acutely with rash.

## 2019-10-31 ENCOUNTER — APPOINTMENT (OUTPATIENT)
Dept: FAMILY MEDICINE | Facility: HOSPITAL | Age: 59
End: 2019-10-31

## 2019-10-31 ENCOUNTER — OUTPATIENT (OUTPATIENT)
Dept: OUTPATIENT SERVICES | Facility: HOSPITAL | Age: 59
LOS: 1 days | End: 2019-10-31
Payer: SELF-PAY

## 2019-10-31 VITALS
RESPIRATION RATE: 16 BRPM | WEIGHT: 125 LBS | SYSTOLIC BLOOD PRESSURE: 103 MMHG | OXYGEN SATURATION: 99 % | DIASTOLIC BLOOD PRESSURE: 68 MMHG | HEART RATE: 80 BPM | TEMPERATURE: 98.3 F | BODY MASS INDEX: 24.41 KG/M2

## 2019-10-31 DIAGNOSIS — D35.2 BENIGN NEOPLASM OF PITUITARY GLAND: Chronic | ICD-10-CM

## 2019-10-31 DIAGNOSIS — Z00.00 ENCOUNTER FOR GENERAL ADULT MEDICAL EXAMINATION WITHOUT ABNORMAL FINDINGS: ICD-10-CM

## 2019-10-31 PROCEDURE — G0463: CPT

## 2019-10-31 RX ORDER — METRONIDAZOLE 10 MG/60G
1 CREAM TOPICAL TWICE DAILY
Qty: 1 | Refills: 0 | Status: DISCONTINUED | COMMUNITY
Start: 2019-10-31 | End: 2019-10-31

## 2019-10-31 RX ORDER — MAGNESIUM OXIDE 241.3 MG/1000MG
400 TABLET ORAL TWICE DAILY
Qty: 60 | Refills: 5 | Status: DISCONTINUED | COMMUNITY
Start: 2019-08-08 | End: 2019-10-31

## 2019-10-31 NOTE — PHYSICAL EXAM
[No Acute Distress] : no acute distress [Well Nourished] : well nourished [Well Developed] : well developed [Well-Appearing] : well-appearing [Normal Sclera/Conjunctiva] : normal sclera/conjunctiva [Normal Oropharynx] : the oropharynx was normal [No Lymphadenopathy] : no lymphadenopathy [Supple] : supple [Thyroid Normal, No Nodules] : the thyroid was normal and there were no nodules present [No Respiratory Distress] : no respiratory distress  [Clear to Auscultation] : lungs were clear to auscultation bilaterally [Normal Rate] : normal rate  [Regular Rhythm] : with a regular rhythm [Normal S1, S2] : normal S1 and S2 [Coordination Grossly Intact] : coordination grossly intact [No Focal Deficits] : no focal deficits [Normal Gait] : normal gait [de-identified] : erythema of the forehead and cheeks. One pimple on the left of the nose

## 2019-10-31 NOTE — HISTORY OF PRESENT ILLNESS
[de-identified] : 59F w/ Hx of hypothyroid and HLD who was recently here for a facial rash and was given hydrocortisone and is here for a follow up appointment. The hydrocortisone initially helped but the patient states that it returned when she stops using the cream the rash returns. \par Noticed peeling. It is worse with sun exposure. Also associated with comodones. Denies changes in soap, face wash, lotion, or medication.

## 2019-10-31 NOTE — REVIEW OF SYSTEMS
[Itching] : Itching [Skin Rash] : skin rash [Negative] : Heme/Lymph [de-identified] : facial rash w/ itching

## 2019-10-31 NOTE — ASSESSMENT
[FreeTextEntry1] : 59F w/ hx of hypothyroidism and HLD who is here for facial rash follow up\par Rosacea\par -Clindamycin lotion twice a day\par -instructed to stop the hyddrocortisone while on the clindamyciin\par -instructed to wear hats and avoid the sun\par -can consider derm referral if no improvement  \par \par \par RTC in 1 week for follow up\par \par Discussed w/ Dr. Gonzales

## 2019-11-01 DIAGNOSIS — L23.9 ALLERGIC CONTACT DERMATITIS, UNSPECIFIED CAUSE: ICD-10-CM

## 2019-11-01 DIAGNOSIS — L71.9 ROSACEA, UNSPECIFIED: ICD-10-CM

## 2019-11-11 ENCOUNTER — APPOINTMENT (OUTPATIENT)
Dept: FAMILY MEDICINE | Facility: HOSPITAL | Age: 59
End: 2019-11-11

## 2019-11-20 ENCOUNTER — APPOINTMENT (OUTPATIENT)
Dept: FAMILY MEDICINE | Facility: HOSPITAL | Age: 59
End: 2019-11-20

## 2019-11-20 ENCOUNTER — OUTPATIENT (OUTPATIENT)
Dept: OUTPATIENT SERVICES | Facility: HOSPITAL | Age: 59
LOS: 1 days | End: 2019-11-20
Payer: SELF-PAY

## 2019-11-20 VITALS
HEIGHT: 60 IN | BODY MASS INDEX: 24.74 KG/M2 | DIASTOLIC BLOOD PRESSURE: 81 MMHG | WEIGHT: 126 LBS | HEART RATE: 75 BPM | RESPIRATION RATE: 6 BRPM | OXYGEN SATURATION: 98 % | SYSTOLIC BLOOD PRESSURE: 117 MMHG | TEMPERATURE: 97.7 F

## 2019-11-20 DIAGNOSIS — D35.2 BENIGN NEOPLASM OF PITUITARY GLAND: Chronic | ICD-10-CM

## 2019-11-20 DIAGNOSIS — Z00.00 ENCOUNTER FOR GENERAL ADULT MEDICAL EXAMINATION WITHOUT ABNORMAL FINDINGS: ICD-10-CM

## 2019-11-20 RX ORDER — HYDROCORTISONE 10 MG/G
1 CREAM TOPICAL TWICE DAILY
Qty: 15 | Refills: 1 | Status: DISCONTINUED | COMMUNITY
Start: 2019-10-22 | End: 2019-11-20

## 2019-11-20 RX ORDER — CLINDAMYCIN PHOSPHATE 10 MG/ML
1 LOTION TOPICAL TWICE DAILY
Qty: 1 | Refills: 0 | Status: DISCONTINUED | COMMUNITY
Start: 2019-10-31 | End: 2019-11-20

## 2019-11-20 NOTE — HISTORY OF PRESENT ILLNESS
[de-identified] : 59F w/ hx of hypothyroidism and hld who is coming in for follow up for rosacea. Pt was given clindamycin at last visit. PT states that the redness and most of the spots have improved There is still two red dots on the forehead that is causing some pain when she washes her hair.

## 2019-11-20 NOTE — ASSESSMENT
[FreeTextEntry1] : 59F w/ hx of HLD and hypothyroidism coming in for rosacea follow up\par Rosacea and actinic keratosis\par -encourage to avoid the sun and wear hats \par -will give a dermatology referral\par \par \par Hypothyroid \par -will check tsh. Referral given\par \par above discussed w/ Dr. Tillman

## 2019-11-20 NOTE — PHYSICAL EXAM
[No Acute Distress] : no acute distress [Well Nourished] : well nourished [Well Developed] : well developed [PERRL] : pupils equal round and reactive to light [Normal Oropharynx] : the oropharynx was normal [No Lymphadenopathy] : no lymphadenopathy [Supple] : supple [Thyroid Normal, No Nodules] : the thyroid was normal and there were no nodules present [No Respiratory Distress] : no respiratory distress  [No Accessory Muscle Use] : no accessory muscle use [Normal Rate] : normal rate  [Clear to Auscultation] : lungs were clear to auscultation bilaterally [Regular Rhythm] : with a regular rhythm [Normal S1, S2] : normal S1 and S2 [Normal Posterior Cervical Nodes] : no posterior cervical lymphadenopathy [No Murmur] : no murmur heard [Coordination Grossly Intact] : coordination grossly intact [Grossly Normal Strength/Tone] : grossly normal strength/tone [No Focal Deficits] : no focal deficits [Normal Anterior Cervical Nodes] : no anterior cervical lymphadenopathy [Normal Insight/Judgement] : insight and judgment were intact [Normal Affect] : the affect was normal [Normal Gait] : normal gait [de-identified] : actinic keratosis on forehead

## 2019-11-21 DIAGNOSIS — E03.9 HYPOTHYROIDISM, UNSPECIFIED: ICD-10-CM

## 2019-11-21 DIAGNOSIS — L71.9 ROSACEA, UNSPECIFIED: ICD-10-CM

## 2019-11-27 ENCOUNTER — OUTPATIENT (OUTPATIENT)
Dept: OUTPATIENT SERVICES | Facility: HOSPITAL | Age: 59
LOS: 1 days | End: 2019-11-27

## 2019-11-27 DIAGNOSIS — D35.2 BENIGN NEOPLASM OF PITUITARY GLAND: Chronic | ICD-10-CM

## 2019-11-27 DIAGNOSIS — E03.9 HYPOTHYROIDISM, UNSPECIFIED: ICD-10-CM

## 2019-11-27 PROCEDURE — G0463: CPT

## 2019-11-27 PROCEDURE — 84443 ASSAY THYROID STIM HORMONE: CPT

## 2019-11-29 LAB — TSH SERPL-ACNC: 2.16 UIU/ML

## 2019-12-18 ENCOUNTER — APPOINTMENT (OUTPATIENT)
Dept: OPHTHALMOLOGY | Facility: CLINIC | Age: 59
End: 2019-12-18

## 2019-12-20 ENCOUNTER — APPOINTMENT (OUTPATIENT)
Dept: FAMILY MEDICINE | Facility: HOSPITAL | Age: 59
End: 2019-12-20

## 2020-01-17 ENCOUNTER — INPATIENT (INPATIENT)
Facility: HOSPITAL | Age: 60
LOS: 0 days | Discharge: HOME | DRG: 149 | End: 2020-01-18
Attending: FAMILY MEDICINE | Admitting: FAMILY MEDICINE
Payer: MEDICAID

## 2020-01-17 VITALS
OXYGEN SATURATION: 100 % | WEIGHT: 123.9 LBS | SYSTOLIC BLOOD PRESSURE: 134 MMHG | HEART RATE: 79 BPM | HEIGHT: 59 IN | DIASTOLIC BLOOD PRESSURE: 83 MMHG | TEMPERATURE: 98 F | RESPIRATION RATE: 15 BRPM

## 2020-01-17 DIAGNOSIS — R09.89 OTHER SPECIFIED SYMPTOMS AND SIGNS INVOLVING THE CIRCULATORY AND RESPIRATORY SYSTEMS: ICD-10-CM

## 2020-01-17 DIAGNOSIS — R07.9 CHEST PAIN, UNSPECIFIED: ICD-10-CM

## 2020-01-17 DIAGNOSIS — E03.9 HYPOTHYROIDISM, UNSPECIFIED: ICD-10-CM

## 2020-01-17 DIAGNOSIS — R42 DIZZINESS AND GIDDINESS: ICD-10-CM

## 2020-01-17 DIAGNOSIS — D35.2 BENIGN NEOPLASM OF PITUITARY GLAND: Chronic | ICD-10-CM

## 2020-01-17 DIAGNOSIS — Z29.9 ENCOUNTER FOR PROPHYLACTIC MEASURES, UNSPECIFIED: ICD-10-CM

## 2020-01-17 LAB
ALBUMIN SERPL ELPH-MCNC: 3.8 G/DL — SIGNIFICANT CHANGE UP (ref 3.3–5)
ALP SERPL-CCNC: 83 U/L — SIGNIFICANT CHANGE UP (ref 40–120)
ALT FLD-CCNC: 25 U/L — SIGNIFICANT CHANGE UP (ref 10–45)
ANION GAP SERPL CALC-SCNC: 6 MMOL/L — SIGNIFICANT CHANGE UP (ref 5–17)
APTT BLD: 28.8 SEC — SIGNIFICANT CHANGE UP (ref 27.5–36.3)
AST SERPL-CCNC: 22 U/L — SIGNIFICANT CHANGE UP (ref 10–40)
BILIRUB SERPL-MCNC: 0.5 MG/DL — SIGNIFICANT CHANGE UP (ref 0.2–1.2)
BUN SERPL-MCNC: 13 MG/DL — SIGNIFICANT CHANGE UP (ref 7–23)
CALCIUM SERPL-MCNC: 9.3 MG/DL — SIGNIFICANT CHANGE UP (ref 8.4–10.5)
CHLORIDE SERPL-SCNC: 103 MMOL/L — SIGNIFICANT CHANGE UP (ref 96–108)
CO2 SERPL-SCNC: 32 MMOL/L — HIGH (ref 22–31)
CREAT SERPL-MCNC: 0.68 MG/DL — SIGNIFICANT CHANGE UP (ref 0.5–1.3)
GLUCOSE SERPL-MCNC: 96 MG/DL — SIGNIFICANT CHANGE UP (ref 70–99)
HCT VFR BLD CALC: 41.2 % — SIGNIFICANT CHANGE UP (ref 34.5–45)
HGB BLD-MCNC: 13.9 G/DL — SIGNIFICANT CHANGE UP (ref 11.5–15.5)
INR BLD: 1.01 RATIO — SIGNIFICANT CHANGE UP (ref 0.88–1.16)
MCHC RBC-ENTMCNC: 29.6 PG — SIGNIFICANT CHANGE UP (ref 27–34)
MCHC RBC-ENTMCNC: 33.7 GM/DL — SIGNIFICANT CHANGE UP (ref 32–36)
MCV RBC AUTO: 87.7 FL — SIGNIFICANT CHANGE UP (ref 80–100)
NRBC # BLD: 0 /100 WBCS — SIGNIFICANT CHANGE UP (ref 0–0)
PLATELET # BLD AUTO: 260 K/UL — SIGNIFICANT CHANGE UP (ref 150–400)
POTASSIUM SERPL-MCNC: 4.3 MMOL/L — SIGNIFICANT CHANGE UP (ref 3.5–5.3)
POTASSIUM SERPL-SCNC: 4.3 MMOL/L — SIGNIFICANT CHANGE UP (ref 3.5–5.3)
PROT SERPL-MCNC: 7.8 G/DL — SIGNIFICANT CHANGE UP (ref 6–8.3)
PROTHROM AB SERPL-ACNC: 11.3 SEC — SIGNIFICANT CHANGE UP (ref 10–12.9)
RBC # BLD: 4.7 M/UL — SIGNIFICANT CHANGE UP (ref 3.8–5.2)
RBC # FLD: 12.4 % — SIGNIFICANT CHANGE UP (ref 10.3–14.5)
SODIUM SERPL-SCNC: 141 MMOL/L — SIGNIFICANT CHANGE UP (ref 135–145)
TROPONIN I SERPL-MCNC: <.017 NG/ML — LOW (ref 0.02–0.06)
WBC # BLD: 3.54 K/UL — LOW (ref 3.8–10.5)
WBC # FLD AUTO: 3.54 K/UL — LOW (ref 3.8–10.5)

## 2020-01-17 PROCEDURE — 71275 CT ANGIOGRAPHY CHEST: CPT | Mod: 26

## 2020-01-17 PROCEDURE — 71045 X-RAY EXAM CHEST 1 VIEW: CPT | Mod: 26

## 2020-01-17 PROCEDURE — 99221 1ST HOSP IP/OBS SF/LOW 40: CPT

## 2020-01-17 PROCEDURE — 99285 EMERGENCY DEPT VISIT HI MDM: CPT

## 2020-01-17 PROCEDURE — 93010 ELECTROCARDIOGRAM REPORT: CPT

## 2020-01-17 RX ORDER — NITROGLYCERIN 6.5 MG
1 CAPSULE, EXTENDED RELEASE ORAL ONCE
Refills: 0 | Status: COMPLETED | OUTPATIENT
Start: 2020-01-17 | End: 2020-01-17

## 2020-01-17 RX ORDER — SODIUM CHLORIDE 9 MG/ML
1000 INJECTION INTRAMUSCULAR; INTRAVENOUS; SUBCUTANEOUS
Refills: 0 | Status: COMPLETED | OUTPATIENT
Start: 2020-01-17 | End: 2020-01-17

## 2020-01-17 RX ORDER — SENNA PLUS 8.6 MG/1
2 TABLET ORAL AT BEDTIME
Refills: 0 | Status: DISCONTINUED | OUTPATIENT
Start: 2020-01-17 | End: 2020-01-18

## 2020-01-17 RX ORDER — ACETAMINOPHEN 500 MG
650 TABLET ORAL EVERY 6 HOURS
Refills: 0 | Status: DISCONTINUED | OUTPATIENT
Start: 2020-01-17 | End: 2020-01-18

## 2020-01-17 RX ORDER — ENOXAPARIN SODIUM 100 MG/ML
40 INJECTION SUBCUTANEOUS DAILY
Refills: 0 | Status: DISCONTINUED | OUTPATIENT
Start: 2020-01-17 | End: 2020-01-18

## 2020-01-17 RX ORDER — ASPIRIN/CALCIUM CARB/MAGNESIUM 324 MG
324 TABLET ORAL DAILY
Refills: 0 | Status: DISCONTINUED | OUTPATIENT
Start: 2020-01-17 | End: 2020-01-18

## 2020-01-17 RX ORDER — MECLIZINE HCL 12.5 MG
50 TABLET ORAL ONCE
Refills: 0 | Status: COMPLETED | OUTPATIENT
Start: 2020-01-17 | End: 2020-01-17

## 2020-01-17 RX ORDER — ATORVASTATIN CALCIUM 80 MG/1
40 TABLET, FILM COATED ORAL AT BEDTIME
Refills: 0 | Status: DISCONTINUED | OUTPATIENT
Start: 2020-01-17 | End: 2020-01-18

## 2020-01-17 RX ADMIN — Medication 324 MILLIGRAM(S): at 09:11

## 2020-01-17 RX ADMIN — Medication 50 MILLIGRAM(S): at 09:10

## 2020-01-17 RX ADMIN — Medication 1 INCH(S): at 21:00

## 2020-01-17 RX ADMIN — SODIUM CHLORIDE 1000 MILLILITER(S): 9 INJECTION INTRAMUSCULAR; INTRAVENOUS; SUBCUTANEOUS at 09:11

## 2020-01-17 RX ADMIN — ATORVASTATIN CALCIUM 40 MILLIGRAM(S): 80 TABLET, FILM COATED ORAL at 20:57

## 2020-01-17 RX ADMIN — Medication 1 INCH(S): at 09:10

## 2020-01-17 RX ADMIN — Medication 650 MILLIGRAM(S): at 17:19

## 2020-01-17 RX ADMIN — SODIUM CHLORIDE 1000 MILLILITER(S): 9 INJECTION INTRAMUSCULAR; INTRAVENOUS; SUBCUTANEOUS at 11:20

## 2020-01-17 NOTE — ED ADULT NURSE REASSESSMENT NOTE - REASSESS COMMUNICATION
Dr. Gonzalez/ED physician notified
Dr. Gonzalez/ED physician notified
ED physician notified/Dr. Gonzalez

## 2020-01-17 NOTE — CHART NOTE - NSCHARTNOTEFT_GEN_A_CORE
I was informed by Dr. Gonzalez that this patient in ED for evaluation of chest pain. Patient follows closely with cardiology Dr. Teran and [facility]. Patient had cardiac work -up leading up to C on 4/23/2019 at [facility]. Cardiac Cath report notes that there was no coronary artery disease. On September 4th, 2019, patient was told to Cardiology to d/c Atorvastatin and ASA as her ASCVD risk was 3.1%. Patient has a scheduled f/u appt with Dr. Manzo at [facility] on 2/5/2020.

## 2020-01-17 NOTE — ED ADULT NURSE NOTE - OBJECTIVE STATEMENT
59 yr old female c/o dizziness and left sided chest pain started last night. Pt states "I was walking last night and started having intermittent chest pain, took Tylenol without relief". Pt denies fever, chills, nausea, vomiting. PMHX: Hyperlipidemia, Hypothyroidism

## 2020-01-17 NOTE — ED ADULT NURSE REASSESSMENT NOTE - NS ED NURSE REASSESS COMMENT FT1
Pt c/o headache 2/10. pt medicated for pain with Tylenol 650 mg per admitting orders. VSS. Awaiting Telemetry bed. pt denies chest pain, dizziness, shortness of breath. CTM

## 2020-01-17 NOTE — ED ADULT NURSE REASSESSMENT NOTE - STATUS
Pt awaiting Tele bed/awaiting bed, no change
Pt awaiting Tele bed, Nurse manager Luisa jeffrey,. CTM
awaiting admitting orders

## 2020-01-17 NOTE — ED ADULT NURSE REASSESSMENT NOTE - GENERAL PATIENT STATE
comfortable appearance
comfortable appearance
comfortable appearance/Continuous monitoring
improvement verbalized/comfortable appearance

## 2020-01-17 NOTE — H&P ADULT - MUSCULOSKELETAL
negative detailed exam no joint swelling/ROM intact/normal strength/no joint erythema/no joint warmth/no calf tenderness

## 2020-01-17 NOTE — PATIENT PROFILE ADULT - FLU SEASON?
Patient: Elli Edwards    Procedure Summary     Date Anesthesia Start Anesthesia Stop Room / Location    02/16/17 0812 0836  [facility]       Procedure Diagnosis Surgeon Provider    LEFT CATARACT PHACO EXTRACTION WITH INTRAOCULAR LENS IMPLANT (Left Eye) No diagnosis on file. MD Justice Katz CRNA          Anesthesia Type: MAC  Last vitals  BP      Temp      Pulse     Resp      SpO2        Post Anesthesia Care and Evaluation    Patient location during evaluation: PACU  Patient participation: complete - patient participated  Level of consciousness: awake and alert  Pain management: adequate  Airway patency: patent  Anesthetic complications: No anesthetic complications  PONV Status: none  Cardiovascular status: acceptable  Respiratory status: acceptable  Hydration status: acceptable       Yes...

## 2020-01-17 NOTE — H&P ADULT - PROBLEM SELECTOR PLAN 3
- TSH 2.4 11/2019  - clinically euthyroid  - no levothyroxine supplementation needed at this time  - outpatient f/u

## 2020-01-17 NOTE — H&P ADULT - NSHPPHYSICALEXAM_GEN_ALL_CORE
Vital Signs Last 24 Hrs  T(C): 36.6 (17 Jan 2020 08:35), Max: 36.6 (17 Jan 2020 08:35)  T(F): 97.9 (17 Jan 2020 08:35), Max: 97.9 (17 Jan 2020 08:35)  HR: 66 (17 Jan 2020 12:05) (66 - 79)  BP: 132/71 (17 Jan 2020 12:05) (123/69 - 134/83)  RR: 16 (17 Jan 2020 12:05) (15 - 18)  SpO2: 98% (17 Jan 2020 12:05) (98% - 100%)

## 2020-01-17 NOTE — H&P ADULT - PROBLEM SELECTOR PLAN 1
- recent cardiac w/u in 2019 including cardiac cath negative  - admit to tele  - trop negative x 2  - continue , statin  - f/u Echo  - Cardio recommendations appreciated

## 2020-01-17 NOTE — H&P ADULT - HISTORY OF PRESENT ILLNESS
59F w/PMH of HLD, hypothyroidism (not on meds as she is clinically euthyroid) presenting to ED complaining of stabbing L sided chest pain and pressure that radiates to left arm. Pain began suddenly while walking home and was 8/10. Patient states pain associated with dizziness (she spins while the room stays still). 59F w/PMH of HLD, hypothyroidism (not on meds as she is clinically euthyroid) presenting to ED complaining of stabbing L sided chest pain and pressure that radiates to left arm. Pain began suddenly while walking home and was 8/10. Patient states pain associated with dizziness (she spins while the room stays still). Pain aggravated by walking and deep inspiration. States tylenol did not improve pain. Patient states that she had similar pain last year that she was hospitalized for and ultimately had Cath (4/2019- no CAD).     In ED, patient received Nitro ointment which improved pain mildly. 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 Pain began suddenly while walking home and was 8/10. Patient states pain associated with dizziness (she spins while the room stays still). Pain aggravated by walking and deep inspiration. States tylenol did not improve pain. Patient states that she had similar pain last year that she was hospitalized for and ultimately had Cath (4/2019- no CAD).     In ED, patient received Nitro ointment which improved pain mildly.

## 2020-01-17 NOTE — H&P ADULT - ASSESSMENT
59F w/PMH of HLD, hypothyroidism (not on meds as she is clinically euthyroid), Rathke pouch tumor s/p removal presenting to ED complaining of Left sided CP and dizziness.           IMPROVE VTE Individual Risk Assessment    RISK                                                                Points    [  ] Previous VTE                                                  3    [  ] Thrombophilia                                               2    [  ] Lower limb paralysis                                      2        (unable to hold up >15 seconds)      [  ] Current Cancer                                              2         (within 6 months)    [  ] Immobilization > 24 hrs                                1    [  ] ICU/CCU stay > 24 hours                              1    [ x ] Age > 60                                                      1    IMPROVE VTE Score ___1______    IMPROVE Score 0-1: Low Risk, No VTE prophylaxis required for most patients, encourage ambulation.   IMPROVE Score 2-3: At risk, pharmacologic VTE prophylaxis is indicated for most patients (in the absence of a contraindication)  IMPROVE Score > or = 4: High Risk, pharmacologic VTE prophylaxis is indicated for most patients (in the absence of a contraindication)

## 2020-01-17 NOTE — ED PROVIDER NOTE - CONSTITUTIONAL, MLM
normal... Well appearing, awake, alert, oriented to person, place, time/situation and in no apparent distress. + camis halpike test

## 2020-01-18 ENCOUNTER — TRANSCRIPTION ENCOUNTER (OUTPATIENT)
Age: 60
End: 2020-01-18

## 2020-01-18 VITALS
DIASTOLIC BLOOD PRESSURE: 64 MMHG | TEMPERATURE: 98 F | HEART RATE: 92 BPM | SYSTOLIC BLOOD PRESSURE: 103 MMHG | RESPIRATION RATE: 16 BRPM | OXYGEN SATURATION: 99 %

## 2020-01-18 LAB
ANION GAP SERPL CALC-SCNC: 10 MMOL/L — SIGNIFICANT CHANGE UP (ref 5–17)
BASOPHILS # BLD AUTO: 0.02 K/UL — SIGNIFICANT CHANGE UP (ref 0–0.2)
BASOPHILS NFR BLD AUTO: 0.5 % — SIGNIFICANT CHANGE UP (ref 0–2)
BUN SERPL-MCNC: 15 MG/DL — SIGNIFICANT CHANGE UP (ref 7–23)
CALCIUM SERPL-MCNC: 8.8 MG/DL — SIGNIFICANT CHANGE UP (ref 8.4–10.5)
CHLORIDE SERPL-SCNC: 106 MMOL/L — SIGNIFICANT CHANGE UP (ref 96–108)
CO2 SERPL-SCNC: 28 MMOL/L — SIGNIFICANT CHANGE UP (ref 22–31)
CREAT SERPL-MCNC: 0.64 MG/DL — SIGNIFICANT CHANGE UP (ref 0.5–1.3)
EOSINOPHIL # BLD AUTO: 0.1 K/UL — SIGNIFICANT CHANGE UP (ref 0–0.5)
EOSINOPHIL NFR BLD AUTO: 2.5 % — SIGNIFICANT CHANGE UP (ref 0–6)
GLUCOSE SERPL-MCNC: 95 MG/DL — SIGNIFICANT CHANGE UP (ref 70–99)
HCT VFR BLD CALC: 38.5 % — SIGNIFICANT CHANGE UP (ref 34.5–45)
HGB BLD-MCNC: 12.7 G/DL — SIGNIFICANT CHANGE UP (ref 11.5–15.5)
IMM GRANULOCYTES NFR BLD AUTO: 0.2 % — SIGNIFICANT CHANGE UP (ref 0–1.5)
LYMPHOCYTES # BLD AUTO: 1.89 K/UL — SIGNIFICANT CHANGE UP (ref 1–3.3)
LYMPHOCYTES # BLD AUTO: 46.9 % — HIGH (ref 13–44)
MCHC RBC-ENTMCNC: 29.1 PG — SIGNIFICANT CHANGE UP (ref 27–34)
MCHC RBC-ENTMCNC: 33 GM/DL — SIGNIFICANT CHANGE UP (ref 32–36)
MCV RBC AUTO: 88.1 FL — SIGNIFICANT CHANGE UP (ref 80–100)
MONOCYTES # BLD AUTO: 0.31 K/UL — SIGNIFICANT CHANGE UP (ref 0–0.9)
MONOCYTES NFR BLD AUTO: 7.7 % — SIGNIFICANT CHANGE UP (ref 2–14)
NEUTROPHILS # BLD AUTO: 1.7 K/UL — LOW (ref 1.8–7.4)
NEUTROPHILS NFR BLD AUTO: 42.2 % — LOW (ref 43–77)
NRBC # BLD: 0 /100 WBCS — SIGNIFICANT CHANGE UP (ref 0–0)
PLATELET # BLD AUTO: 227 K/UL — SIGNIFICANT CHANGE UP (ref 150–400)
POTASSIUM SERPL-MCNC: 3.8 MMOL/L — SIGNIFICANT CHANGE UP (ref 3.5–5.3)
POTASSIUM SERPL-SCNC: 3.8 MMOL/L — SIGNIFICANT CHANGE UP (ref 3.5–5.3)
RBC # BLD: 4.37 M/UL — SIGNIFICANT CHANGE UP (ref 3.8–5.2)
RBC # FLD: 12.6 % — SIGNIFICANT CHANGE UP (ref 10.3–14.5)
SODIUM SERPL-SCNC: 144 MMOL/L — SIGNIFICANT CHANGE UP (ref 135–145)
WBC # BLD: 4.03 K/UL — SIGNIFICANT CHANGE UP (ref 3.8–10.5)
WBC # FLD AUTO: 4.03 K/UL — SIGNIFICANT CHANGE UP (ref 3.8–10.5)

## 2020-01-18 PROCEDURE — 80053 COMPREHEN METABOLIC PANEL: CPT

## 2020-01-18 PROCEDURE — 85027 COMPLETE CBC AUTOMATED: CPT

## 2020-01-18 PROCEDURE — 99285 EMERGENCY DEPT VISIT HI MDM: CPT | Mod: 25

## 2020-01-18 PROCEDURE — 85610 PROTHROMBIN TIME: CPT

## 2020-01-18 PROCEDURE — 71045 X-RAY EXAM CHEST 1 VIEW: CPT

## 2020-01-18 PROCEDURE — 93306 TTE W/DOPPLER COMPLETE: CPT

## 2020-01-18 PROCEDURE — 96361 HYDRATE IV INFUSION ADD-ON: CPT

## 2020-01-18 PROCEDURE — 84484 ASSAY OF TROPONIN QUANT: CPT

## 2020-01-18 PROCEDURE — 80048 BASIC METABOLIC PNL TOTAL CA: CPT

## 2020-01-18 PROCEDURE — 93005 ELECTROCARDIOGRAM TRACING: CPT

## 2020-01-18 PROCEDURE — 93306 TTE W/DOPPLER COMPLETE: CPT | Mod: 26

## 2020-01-18 PROCEDURE — 36415 COLL VENOUS BLD VENIPUNCTURE: CPT

## 2020-01-18 PROCEDURE — 96360 HYDRATION IV INFUSION INIT: CPT

## 2020-01-18 PROCEDURE — 71275 CT ANGIOGRAPHY CHEST: CPT

## 2020-01-18 PROCEDURE — 85730 THROMBOPLASTIN TIME PARTIAL: CPT

## 2020-01-18 RX ORDER — IBUPROFEN 200 MG
1 TABLET ORAL
Qty: 1 | Refills: 0
Start: 2020-01-18

## 2020-01-18 RX ORDER — MECLIZINE HCL 12.5 MG
1 TABLET ORAL
Qty: 14 | Refills: 0
Start: 2020-01-18 | End: 2020-01-24

## 2020-01-18 RX ADMIN — ENOXAPARIN SODIUM 40 MILLIGRAM(S): 100 INJECTION SUBCUTANEOUS at 12:22

## 2020-01-18 RX ADMIN — Medication 324 MILLIGRAM(S): at 13:12

## 2020-01-18 NOTE — DISCHARGE NOTE PROVIDER - CARE PROVIDER_API CALL
[facility],   Phone: (   )    -  Fax: (   )    -  Established Patient  Follow Up Time: 1 week

## 2020-01-18 NOTE — DISCHARGE NOTE NURSING/CASE MANAGEMENT/SOCIAL WORK - PATIENT PORTAL LINK FT
You can access the FollowMyHealth Patient Portal offered by [facility] by registering at the following website: [email or web address]. By joining [employer]’s FollowMyHealth portal, you will also be able to view your health information using other applications (apps) compatible with our system.

## 2020-01-18 NOTE — DISCHARGE NOTE NURSING/CASE MANAGEMENT/SOCIAL WORK - NSPROEXTENSIONSOFSELF_GEN_A_NUR
Treatment Minutes: 30   Total Treatment Minutes: 50     [x] EVAL LOW 05125  [x] JI(22520) x  2   [] IONTO  [] NMR (33366) x      [] VASO  [] Manual (77084) x       [] Other:  [] TA x       [] Mech Traction (39835)  [] ES(attended) (56976)      [] ES (un) (65951):     GOALS:   Patient stated goal: Understanding of HEP for [facility]    Therapist goals for Patient:   Short Term Goals: To be achieved in: 2 weeks  1. Independent in HEP and progression per patient tolerance, in order to prevent re-injury. 2. Patient will have a decrease in pain to facilitate improvement in movement, function, and ADLs as indicated by Functional Deficits. Long Term Goals: To be achieved in: 6 weeks  1. Disability index score of 20% or less for the LEFS to assist with reaching prior level of function. 2. Patient will demonstrate increased AROM to [facility] to allow for proper joint functioning as indicated by patients Functional Deficits. 3. Patient will demonstrate an increase in Strength to good proximal hip strength and control, within 5lb HHD in LE to allow for proper functional mobility as indicated by patients Functional Deficits. 4. Patient will return to full functional activities without increased symptoms or restriction including ability to ambulate I without gait abnormality. 5. Patient will be able to return to [place] [id number] with good understanding of HEP to prevent need for further intervention. Progression Towards Functional goals:  [] Patient is progressing as expected towards functional goals listed. [] Progression is slowed due to complexities listed. [] Progression has been slowed due to co-morbidities.   [x] Plan just implemented, too soon to assess goals progression  [] Other:     ASSESSMENT:  See eval    Treatment/Activity Tolerance:  [] Patient tolerated treatment well [] Patient limited by fatique  [] Patient limited by pain  [] Patient limited by other medical complications  [] Other:     Prognosis: [] Good [] none

## 2020-01-18 NOTE — DISCHARGE NOTE PROVIDER - NSDCCPCAREPLAN_GEN_ALL_CORE_FT
PRINCIPAL DISCHARGE DIAGNOSIS  Diagnosis: Acute chest pain  Assessment and Plan of Treatment: The chest pain you were experiencing is likely due to your muscles. Please take motrin as needed (with food) to help with pain. The cardiac lab tests and sonograms did not reveal a cause of your pain. You were also monitored for 24 hours and there were no arrythemias seen. Please follow-up at [facility] on Wednesday . Please also follow-up with Cardiology.      SECONDARY DISCHARGE DIAGNOSES  Diagnosis: Dizziness  Assessment and Plan of Treatment: Please take meclizine to help with dizziness.  Please follow-up [facility] Neurology.

## 2020-01-18 NOTE — CHART NOTE - NSCHARTNOTEFT_GEN_A_CORE
Patient states CP has resolved and dizziness has improved since admission. States that the meclizine given in ED improved symptoms greatly. Patient's trop negative x 3 and patient has been NSR 60-90 bpm on telemetry. Echo as below (unchanged from previous echo). At this time, patient cleared to d/c home. Discussed with Dr. Mesa. patient agreeable to d/c home with close f/u at [facility] clinic. Will cancel Cardio consult.     < from: TTE Echo Complete w/Doppler (01.18.20 @ 08:44) >       PHYSICIAN INTERPRETATION:  Left Ventricle: The left ventricular internal cavity size is normal. Increased relative wall thickness with normal mass index consistent with left ventricular concentric remodeling.  Global LV systolic function was normal. Left ventricular ejection fraction, by visual estimation, is 60 to 65%. Spectral Doppler shows impaired relaxation pattern of left ventricular myocardial filling (Grade I diastolic dysfunction).  Right Ventricle: The right ventricular sizeis normal.  Left Atrium: Normal left atrial size. LA volume Index is 16.2 ml/m² ml/m2.  Right Atrium: Normal right atrial size.  Pericardium: There is no evidence of pericardial effusion.  Mitral Valve: The mitral valve is normal in structure. Mild mitral valve regurgitation is seen.  Tricuspid Valve: The tricuspid valve is normal in structure. Trivial tricuspid regurgitation is visualized.  Aortic Valve: The aortic valve is trileaflet. No evidence of aortic valve regurgitation is seen.  PulmonicValve: The pulmonic valve was not well visualized. Trace pulmonic valve regurgitation.  Aorta: The aortic root is normal in size and structure.  Pulmonary Artery: Pulmonary hypertension is absent.       Summary:   1. Left ventricular ejection fraction, by visual estimation, is 60 to 65%.   2. Technically fair study.   3. Normal global left ventricular systolic function.   4. Spectral Doppler shows impaired relaxation pattern of left ventricular myocardial filling (Grade I diastolic dysfunction).   5. Pulmonary hypertension is absent.   6. LA volume Index is 16.2 ml/m² ml/m2.    [id number] Eduin Roy MD,FACC , Electronically signed on 1/18/2020 at 11:50:52 AM    < end of copied text >

## 2020-01-18 NOTE — DISCHARGE NOTE PROVIDER - NSDCFUSCHEDAPPT_GEN_ALL_CORE_FT
KATHLEEN WILKINSON ; 02/05/2020 ; [facility] Cardio [address]  KATHLEEN WILKINSON ; 04/09/2020 ; [facility] Derm [address]

## 2020-01-18 NOTE — DISCHARGE NOTE PROVIDER - NSDCFUADDINST_GEN_ALL_CORE_FT
Please return to ED if you devlop chest pain not relieved with motrin, fever > 100.4F or shortness of breath.

## 2020-01-18 NOTE — DISCHARGE NOTE PROVIDER - NSDCMRMEDTOKEN_GEN_ALL_CORE_FT
simvastatin 20 mg oral tablet: 1 tab(s) orally once a day (at bedtime)  mg oral tablet: 1 tab(s) orally every 6 hours -for mild pain   meclizine 12.5 mg oral tablet: 1 tab(s) orally 2 times a day, As Needed -for dizziness

## 2020-01-18 NOTE — DISCHARGE NOTE PROVIDER - HOSPITAL COURSE
59F w/PMH of HLD, hypothyroidism (not on meds as she is clinically euthyroid), Rathke pouch tumor s/p removal admitted to [facility] for observation after experiencing left sided CP with dizziness the day before admission. Patient admitted to telemetry unit  for 24 hours. Troponin negative x 3 and no arrhythmias noted on cardiac monitoring. Echo was performed on 1/18/2020 noted EF 60-65%, Grade 1 diastolic dysfunction. 59F w/PMH of HLD, hypothyroidism (not on meds as she is clinically euthyroid), Rathke pouch tumor s/p removal admitted to [facility] for observation after experiencing left sided CP with dizziness the day before admission. Patient admitted to telemetry unit  for 24 hours. Troponin negative x 3 and no arrhythmias noted on cardiac monitoring. Echo was performed on 1/18/2020 noted EF 60-65%, Grade 1 diastolic dysfunction. Patient states pain has improved and she doesn't feel dizzy when walking. Requesting meclizine when d/c home.  At this time patient is cleared to be discharged home with f/u with Cardiology and Neurology.         Discharge instructions given via [employer] Interpretor # [id number]        Vital Signs Last 24 Hrs    T(C): 36.7 (18 Jan 2020 04:57), Max: 36.8 (17 Jan 2020 16:43)    T(F): 98 (18 Jan 2020 04:57), Max: 98.3 (17 Jan 2020 19:16)    HR: 66 (18 Jan 2020 04:57) (66 - 90)    BP: 106/62 (18 Jan 2020 04:57) (91/61 - 106/76)    BP(mean): 67 (17 Jan 2020 19:16) (67 - 67)    RR: 16 (18 Jan 2020 04:57) (15 - 17)    SpO2: 100% (18 Jan 2020 04:57) (98% - 100%)        Constitutional: Pt lying in bed, awake and alert, NAD    HEENT: EOMI, normal hearing, moist mucous membranes    Neck: Soft and supple, no JVD    Respiratory: CTABL, No wheezing, rales or rhonchi    Cardiovascular: S1S2+, RRR, no M/G/R    Gastrointestinal: BS+, soft, NT/ND, no guarding, no rebound    Extremities: No peripheral edema    Vascular: 2+ peripheral pulses    Neurological: AAOx3, no focal deficits    Musculoskeletal: 5/5 strength b/l upper and lower extremities, mildly tender to deep palpation left lateral chest wall.    Skin: No rashes

## 2020-01-18 NOTE — DISCHARGE NOTE PROVIDER - PROVIDER TOKENS
FREE:[LAST:[[facility]],PHONE:[(   )    -],FAX:[(   )    -],FOLLOWUP:[1 week],ESTABLISHEDPATIENT:[T]]

## 2020-01-22 ENCOUNTER — OUTPATIENT (OUTPATIENT)
Dept: OUTPATIENT SERVICES | Facility: HOSPITAL | Age: 60
LOS: 1 days | End: 2020-01-22
Payer: SELF-PAY

## 2020-01-22 ENCOUNTER — APPOINTMENT (OUTPATIENT)
Dept: FAMILY MEDICINE | Facility: HOSPITAL | Age: 60
End: 2020-01-22

## 2020-01-22 VITALS
TEMPERATURE: 98 F | WEIGHT: 125 LBS | RESPIRATION RATE: 16 BRPM | OXYGEN SATURATION: 100 % | BODY MASS INDEX: 24.41 KG/M2 | HEART RATE: 75 BPM | SYSTOLIC BLOOD PRESSURE: 105 MMHG | DIASTOLIC BLOOD PRESSURE: 71 MMHG

## 2020-01-22 DIAGNOSIS — D35.2 BENIGN NEOPLASM OF PITUITARY GLAND: Chronic | ICD-10-CM

## 2020-01-22 DIAGNOSIS — Z00.00 ENCOUNTER FOR GENERAL ADULT MEDICAL EXAMINATION WITHOUT ABNORMAL FINDINGS: ICD-10-CM

## 2020-01-22 PROCEDURE — G0463: CPT

## 2020-01-22 NOTE — REVIEW OF SYSTEMS
[Chest Pain] : chest pain [Dizziness] : dizziness [Negative] : Psychiatric [FreeTextEntry5] : chest pain improved [de-identified] : dizziness improved

## 2020-01-22 NOTE — ASSESSMENT
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 Tillman

## 2020-01-22 NOTE — HISTORY OF PRESENT ILLNESS
[Post-hospitalization from ___ Hospital] : Post-hospitalization from [unfilled] Hospital [Admitted on: ___] : The patient was admitted on [unfilled] [Discharged on ___] : discharged on [unfilled] [FreeTextEntry2] : 59F w/PMH of HLD, hypothyroidism (not on meds as she is clinically euthyroid), Rathke pouch tumor s/p removal admitted to [facility] for observation after experiencing left sided CP with dizziness the day before admission. CTA negative for PE. Patient admitted to telemetry unit for 24 hours. Troponin negative x 3 and no arrhythmias noted on cardiac monitoring. Echo was performed on 1/18/2020 noted EF 60-65%, Grade 1 diastolic dysfunction. Patient states pain has improved and she doesn't feel dizzy when walking. Requesting meclizine when d/c home. At this time patient is cleared and discharged home\par \par Pt states that on Monday was the last time she experience dizziness and the chest pain has resolved. Pt  has no other complaints

## 2020-01-22 NOTE — PHYSICAL EXAM
[Well Nourished] : well nourished [No Acute Distress] : no acute distress [Well Developed] : well developed [Well-Appearing] : well-appearing [Normal Sclera/Conjunctiva] : normal sclera/conjunctiva [EOMI] : extraocular movements intact [PERRL] : pupils equal round and reactive to light [No Lymphadenopathy] : no lymphadenopathy [Supple] : supple [Normal Oropharynx] : the oropharynx was normal [No Accessory Muscle Use] : no accessory muscle use [No Respiratory Distress] : no respiratory distress  [Normal Rate] : normal rate  [Clear to Auscultation] : lungs were clear to auscultation bilaterally [Regular Rhythm] : with a regular rhythm [Normal S1, S2] : normal S1 and S2 [No Murmur] : no murmur heard [Coordination Grossly Intact] : coordination grossly intact [No Focal Deficits] : no focal deficits [Grossly Normal Strength/Tone] : grossly normal strength/tone [Normal Affect] : the affect was normal [Normal Gait] : normal gait [Normal Insight/Judgement] : insight and judgment were intact

## 2020-01-24 ENCOUNTER — APPOINTMENT (OUTPATIENT)
Dept: DERMATOLOGY | Facility: HOSPITAL | Age: 60
End: 2020-01-24

## 2020-01-24 DIAGNOSIS — R07.9 CHEST PAIN, UNSPECIFIED: ICD-10-CM

## 2020-01-28 ENCOUNTER — NON-APPOINTMENT (OUTPATIENT)
Age: 60
End: 2020-01-28

## 2020-01-30 ENCOUNTER — APPOINTMENT (OUTPATIENT)
Dept: FAMILY MEDICINE | Facility: HOSPITAL | Age: 60
End: 2020-01-30

## 2020-02-05 ENCOUNTER — APPOINTMENT (OUTPATIENT)
Dept: CARDIOLOGY | Facility: HOSPITAL | Age: 60
End: 2020-02-05

## 2020-02-05 ENCOUNTER — OUTPATIENT (OUTPATIENT)
Dept: OUTPATIENT SERVICES | Facility: HOSPITAL | Age: 60
LOS: 1 days | End: 2020-02-05
Payer: SELF-PAY

## 2020-02-05 VITALS
HEART RATE: 78 BPM | DIASTOLIC BLOOD PRESSURE: 79 MMHG | BODY MASS INDEX: 24.35 KG/M2 | SYSTOLIC BLOOD PRESSURE: 118 MMHG | HEIGHT: 60 IN | OXYGEN SATURATION: 99 % | WEIGHT: 124 LBS

## 2020-02-05 DIAGNOSIS — D35.2 BENIGN NEOPLASM OF PITUITARY GLAND: Chronic | ICD-10-CM

## 2020-02-05 DIAGNOSIS — I25.10 ATHEROSCLEROTIC HEART DISEASE OF NATIVE CORONARY ARTERY WITHOUT ANGINA PECTORIS: ICD-10-CM

## 2020-02-05 PROCEDURE — G0463: CPT

## 2020-02-05 PROCEDURE — 93005 ELECTROCARDIOGRAM TRACING: CPT

## 2020-02-05 NOTE — PHYSICAL EXAM
[General Appearance - Well Developed] : well developed [Normal Appearance] : normal appearance [Well Groomed] : well groomed [General Appearance - Well Nourished] : well nourished [General Appearance - In No Acute Distress] : no acute distress [No Deformities] : no deformities [No Oral Cyanosis] : no oral cyanosis [No Oral Pallor] : no oral pallor [Normal Oral Mucosa] : normal oral mucosa [Normal Jugular Venous A Waves Present] : normal jugular venous A waves present [Normal Jugular Venous V Waves Present] : normal jugular venous V waves present [Murmurs] : no murmurs present [No Jugular Venous Malone A Waves] : no jugular venous malone A waves [Heart Rate And Rhythm] : heart rate and rhythm were normal [Heart Sounds] : normal S1 and S2 [Abdomen Soft] : soft [Abdomen Tenderness] : non-tender [Cyanosis, Localized] : no localized cyanosis [Nail Clubbing] : no clubbing of the fingernails [Abdomen Mass (___ Cm)] : no abdominal mass palpated [Petechial Hemorrhages (___cm)] : no petechial hemorrhages [No Venous Stasis] : no venous stasis [Skin Color & Pigmentation] : normal skin color and pigmentation [] : no rash [No Skin Ulcers] : no skin ulcer [Skin Lesions] : no skin lesions [No Xanthoma] : no  xanthoma was observed

## 2020-02-05 NOTE — HISTORY OF PRESENT ILLNESS
[FreeTextEntry1] : 59F hx of HLD here for follow up.  Initial consultation for intermittent chest pain, extensive workup leading to LHC in 4/2019 which was normal. Pt last seen in 9/2019 where she showed to have normal lipid panel, taken off of statin therapy and aspirin given normal LHC and overall low ASCVD risk score.  Pt states since then she has intermittent episodes of lower left lateral chest wall pain described as sharp, at times with associated dizziness and b/l cold hands.  Episodes last for a few minutes and afterwards she feels tired.  Denies exertional symptoms, palpitations.  Pt went to [facility] 2 weeks ago where she was monitored on telemetry with no events and had a TTE which showed mild concentric LVH otherwise unremarkable stay with negative trops x2.   Otherwise, she has been feeling with no other active complaints.  Denies CP, fevers, chills, n/v, palpitations, Gardner, LE edema. \par  \par

## 2020-02-05 NOTE — ASSESSMENT
[FreeTextEntry1] : 57 y/o F w/ hx of HLD here for follow up.\par \par #left sided chest wall pain\par -unclear etiology; extensive workup including normal LHC 2019, TTE 1/2020, hospital stay without events on telemetry and negative trops\par -advised on obtaining BP cuff to see if related to hypotension\par -advised to discuss w/ PCP possible anxiety vs nervous system issue given dizziness\par \par #HLD\par -ASCVD risk 3.1%\par -awaiting repeat lipid panel\par \par Stephan Manzo,. PGY4

## 2020-02-07 DIAGNOSIS — E78.5 HYPERLIPIDEMIA, UNSPECIFIED: ICD-10-CM

## 2020-02-07 DIAGNOSIS — R07.9 CHEST PAIN, UNSPECIFIED: ICD-10-CM

## 2020-02-28 ENCOUNTER — APPOINTMENT (OUTPATIENT)
Dept: CARDIOLOGY | Facility: HOSPITAL | Age: 60
End: 2020-02-28

## 2020-02-28 ENCOUNTER — APPOINTMENT (OUTPATIENT)
Dept: FAMILY MEDICINE | Facility: HOSPITAL | Age: 60
End: 2020-02-28

## 2020-03-02 ENCOUNTER — OUTPATIENT (OUTPATIENT)
Dept: OUTPATIENT SERVICES | Facility: HOSPITAL | Age: 60
LOS: 1 days | End: 2020-03-02
Payer: SELF-PAY

## 2020-03-02 ENCOUNTER — APPOINTMENT (OUTPATIENT)
Dept: FAMILY MEDICINE | Facility: HOSPITAL | Age: 60
End: 2020-03-02

## 2020-03-02 VITALS
DIASTOLIC BLOOD PRESSURE: 76 MMHG | RESPIRATION RATE: 14 BRPM | SYSTOLIC BLOOD PRESSURE: 107 MMHG | TEMPERATURE: 97.9 F | HEART RATE: 83 BPM | BODY MASS INDEX: 24.61 KG/M2 | WEIGHT: 126 LBS | OXYGEN SATURATION: 99 %

## 2020-03-02 DIAGNOSIS — G44.099 OTHER TRIGEMINAL AUTONOMIC CEPHALGIAS (TAC), NOT INTRACTABLE: ICD-10-CM

## 2020-03-02 DIAGNOSIS — Z87.2 PERSONAL HISTORY OF DISEASES OF THE SKIN AND SUBCUTANEOUS TISSUE: ICD-10-CM

## 2020-03-02 DIAGNOSIS — D35.2 BENIGN NEOPLASM OF PITUITARY GLAND: Chronic | ICD-10-CM

## 2020-03-02 DIAGNOSIS — Z00.00 ENCOUNTER FOR GENERAL ADULT MEDICAL EXAMINATION WITHOUT ABNORMAL FINDINGS: ICD-10-CM

## 2020-03-02 DIAGNOSIS — L23.9 ALLERGIC CONTACT DERMATITIS, UNSPECIFIED CAUSE: ICD-10-CM

## 2020-03-02 DIAGNOSIS — Z87.898 PERSONAL HISTORY OF OTHER SPECIFIED CONDITIONS: ICD-10-CM

## 2020-03-02 DIAGNOSIS — G44.049 CHRONIC PAROXYSMAL HEMICRANIA, NOT INTRACTABLE: ICD-10-CM

## 2020-03-02 PROCEDURE — G0463: CPT

## 2020-03-02 NOTE — REVIEW OF SYSTEMS
[Fatigue] : fatigue [Earache] : earache [Sore Throat] : sore throat [Headache] : headache [Fever] : no fever [Chills] : no chills [Pain] : no pain [Redness] : no redness [Hoarseness] : no hoarseness [Shortness Of Breath] : no shortness of breath [Chest Pain] : no chest pain [FreeTextEntry4] : + nasal congestion

## 2020-03-02 NOTE — ASSESSMENT
[FreeTextEntry1] : 58 yo F with hx of hypothyroidism, chest pain (s/p LHC 4/2019, TTE - 1/2020), pituitary d/o, sigmoid polyp and osteopenia presents with c/o sore throat x 2 days.

## 2020-03-02 NOTE — HISTORY OF PRESENT ILLNESS
[[employer] ] : provided by [employer]   [FreeTextEntry8] : 58 yo F with hx of hypothyroidism, chest pain (s/p LHC 4/2019, TTE - 1/2020), pituitary d/o, sigmoid polyp and osteopenia presents with c/o sore throat x 2 days associated with pain with swallowing. \par Pt denies recent travel and sick contacts. Pt also reports headache, ear pain and nasal congestion, for which she has been using tylenol and mouthwash.\par  [FreeTextEntry1] : #[id number] [TWNoteComboBox1] : Citizen of [place]

## 2020-03-02 NOTE — PHYSICAL EXAM
[No Acute Distress] : no acute distress [Well Developed] : well developed [EOMI] : extraocular movements intact [No Lymphadenopathy] : no lymphadenopathy [Supple] : supple [No Respiratory Distress] : no respiratory distress  [No Accessory Muscle Use] : no accessory muscle use [Clear to Auscultation] : lungs were clear to auscultation bilaterally [Normal Rate] : normal rate  [Regular Rhythm] : with a regular rhythm [Normal S1, S2] : normal S1 and S2 [de-identified] : + erythematous oropharnyx, no exudates ; lesion present on palate , +mild erythema of L ear  +TM intact with trace fluid present , +absent light reflex

## 2020-03-02 NOTE — PLAN
[FreeTextEntry1] : \par \par #Likely Viral URI\par -lesion on palate likely related to viral URI\par -Cepacol lozenges\par -Adequate hydration advised \par -Tea with honey recommended \par -Salt water gargle recommended TID\par \par #Sigmoid polyp\par -Colonoscopy 2017- repeat in 3-5 yrs\par -Pt encouraged to have colonoscopy this year and in agreement with plan given hx of sigmoid polyp\par -Pt has appt with gastroenterology this month \par \par \par If having chest pain, shortness of breath, or in distress, patient advised to go to the ED\par \par Case and plan d/w Dr. Tillman\par \par Follow up in 2 months

## 2020-03-03 DIAGNOSIS — G44.099 OTHER TRIGEMINAL AUTONOMIC CEPHALGIAS (TAC), NOT INTRACTABLE: ICD-10-CM

## 2020-03-03 DIAGNOSIS — B34.9 VIRAL INFECTION, UNSPECIFIED: ICD-10-CM

## 2020-03-11 ENCOUNTER — APPOINTMENT (OUTPATIENT)
Dept: FAMILY MEDICINE | Facility: HOSPITAL | Age: 60
End: 2020-03-11

## 2020-04-26 ENCOUNTER — EMERGENCY (EMERGENCY)
Facility: HOSPITAL | Age: 60
LOS: 1 days | Discharge: HOME | End: 2020-04-26
Attending: INTERNAL MEDICINE | Admitting: INTERNAL MEDICINE
Payer: MEDICAID

## 2020-04-26 VITALS
SYSTOLIC BLOOD PRESSURE: 126 MMHG | RESPIRATION RATE: 18 BRPM | OXYGEN SATURATION: 99 % | HEIGHT: 63 IN | HEART RATE: 112 BPM | DIASTOLIC BLOOD PRESSURE: 84 MMHG | TEMPERATURE: 100 F | WEIGHT: 125 LBS

## 2020-04-26 VITALS
SYSTOLIC BLOOD PRESSURE: 120 MMHG | DIASTOLIC BLOOD PRESSURE: 82 MMHG | HEART RATE: 88 BPM | RESPIRATION RATE: 18 BRPM | OXYGEN SATURATION: 99 %

## 2020-04-26 DIAGNOSIS — R10.9 UNSPECIFIED ABDOMINAL PAIN: ICD-10-CM

## 2020-04-26 DIAGNOSIS — D35.2 BENIGN NEOPLASM OF PITUITARY GLAND: Chronic | ICD-10-CM

## 2020-04-26 LAB
ALBUMIN SERPL ELPH-MCNC: 4.1 G/DL — SIGNIFICANT CHANGE UP (ref 3.3–5)
ALP SERPL-CCNC: 77 U/L — SIGNIFICANT CHANGE UP (ref 40–120)
ALT FLD-CCNC: 21 U/L — SIGNIFICANT CHANGE UP (ref 10–45)
ANION GAP SERPL CALC-SCNC: 7 MMOL/L — SIGNIFICANT CHANGE UP (ref 5–17)
APPEARANCE UR: CLEAR — SIGNIFICANT CHANGE UP
AST SERPL-CCNC: 19 U/L — SIGNIFICANT CHANGE UP (ref 10–40)
BACTERIA # UR AUTO: ABNORMAL /HPF
BASOPHILS # BLD AUTO: 0.04 K/UL — SIGNIFICANT CHANGE UP (ref 0–0.2)
BASOPHILS NFR BLD AUTO: 0.3 % — SIGNIFICANT CHANGE UP (ref 0–2)
BILIRUB SERPL-MCNC: 1.1 MG/DL — SIGNIFICANT CHANGE UP (ref 0.2–1.2)
BILIRUB UR-MCNC: NEGATIVE — SIGNIFICANT CHANGE UP
BUN SERPL-MCNC: 12 MG/DL — SIGNIFICANT CHANGE UP (ref 7–23)
CALCIUM SERPL-MCNC: 9.4 MG/DL — SIGNIFICANT CHANGE UP (ref 8.4–10.5)
CHLORIDE SERPL-SCNC: 103 MMOL/L — SIGNIFICANT CHANGE UP (ref 96–108)
CO2 SERPL-SCNC: 31 MMOL/L — SIGNIFICANT CHANGE UP (ref 22–31)
COLOR SPEC: YELLOW — SIGNIFICANT CHANGE UP
COMMENT - URINE: SIGNIFICANT CHANGE UP
CREAT SERPL-MCNC: 0.74 MG/DL — SIGNIFICANT CHANGE UP (ref 0.5–1.3)
DIFF PNL FLD: ABNORMAL
EOSINOPHIL # BLD AUTO: 0.02 K/UL — SIGNIFICANT CHANGE UP (ref 0–0.5)
EOSINOPHIL NFR BLD AUTO: 0.2 % — SIGNIFICANT CHANGE UP (ref 0–6)
EPI CELLS # UR: SIGNIFICANT CHANGE UP
GLUCOSE SERPL-MCNC: 104 MG/DL — HIGH (ref 70–99)
GLUCOSE UR QL: NEGATIVE — SIGNIFICANT CHANGE UP
HCT VFR BLD CALC: 39.7 % — SIGNIFICANT CHANGE UP (ref 34.5–45)
HGB BLD-MCNC: 13.2 G/DL — SIGNIFICANT CHANGE UP (ref 11.5–15.5)
IMM GRANULOCYTES NFR BLD AUTO: 0.5 % — SIGNIFICANT CHANGE UP (ref 0–1.5)
KETONES UR-MCNC: ABNORMAL
LEUKOCYTE ESTERASE UR-ACNC: NEGATIVE — SIGNIFICANT CHANGE UP
LIDOCAIN IGE QN: 71 U/L — LOW (ref 73–393)
LYMPHOCYTES # BLD AUTO: 1.05 K/UL — SIGNIFICANT CHANGE UP (ref 1–3.3)
LYMPHOCYTES # BLD AUTO: 8.6 % — LOW (ref 13–44)
MCHC RBC-ENTMCNC: 29.2 PG — SIGNIFICANT CHANGE UP (ref 27–34)
MCHC RBC-ENTMCNC: 33.2 GM/DL — SIGNIFICANT CHANGE UP (ref 32–36)
MCV RBC AUTO: 87.8 FL — SIGNIFICANT CHANGE UP (ref 80–100)
MONOCYTES # BLD AUTO: 0.92 K/UL — HIGH (ref 0–0.9)
MONOCYTES NFR BLD AUTO: 7.6 % — SIGNIFICANT CHANGE UP (ref 2–14)
NEUTROPHILS # BLD AUTO: 10.09 K/UL — HIGH (ref 1.8–7.4)
NEUTROPHILS NFR BLD AUTO: 82.8 % — HIGH (ref 43–77)
NITRITE UR-MCNC: NEGATIVE — SIGNIFICANT CHANGE UP
NRBC # BLD: 0 /100 WBCS — SIGNIFICANT CHANGE UP (ref 0–0)
PH UR: 7 — SIGNIFICANT CHANGE UP (ref 5–8)
PLATELET # BLD AUTO: 244 K/UL — SIGNIFICANT CHANGE UP (ref 150–400)
POTASSIUM SERPL-MCNC: 4.7 MMOL/L — SIGNIFICANT CHANGE UP (ref 3.5–5.3)
POTASSIUM SERPL-SCNC: 4.7 MMOL/L — SIGNIFICANT CHANGE UP (ref 3.5–5.3)
PROT SERPL-MCNC: 7.7 G/DL — SIGNIFICANT CHANGE UP (ref 6–8.3)
PROT UR-MCNC: 15
RBC # BLD: 4.52 M/UL — SIGNIFICANT CHANGE UP (ref 3.8–5.2)
RBC # FLD: 12.7 % — SIGNIFICANT CHANGE UP (ref 10.3–14.5)
RBC CASTS # UR COMP ASSIST: ABNORMAL /HPF (ref 0–4)
SODIUM SERPL-SCNC: 141 MMOL/L — SIGNIFICANT CHANGE UP (ref 135–145)
SP GR SPEC: 1.01 — SIGNIFICANT CHANGE UP (ref 1.01–1.02)
UROBILINOGEN FLD QL: NEGATIVE — SIGNIFICANT CHANGE UP
WBC # BLD: 12.18 K/UL — HIGH (ref 3.8–10.5)
WBC # FLD AUTO: 12.18 K/UL — HIGH (ref 3.8–10.5)
WBC UR QL: SIGNIFICANT CHANGE UP /HPF (ref 0–5)

## 2020-04-26 PROCEDURE — 96375 TX/PRO/DX INJ NEW DRUG ADDON: CPT

## 2020-04-26 PROCEDURE — 36415 COLL VENOUS BLD VENIPUNCTURE: CPT

## 2020-04-26 PROCEDURE — 83690 ASSAY OF LIPASE: CPT

## 2020-04-26 PROCEDURE — 80053 COMPREHEN METABOLIC PANEL: CPT

## 2020-04-26 PROCEDURE — 99285 EMERGENCY DEPT VISIT HI MDM: CPT

## 2020-04-26 PROCEDURE — 74177 CT ABD & PELVIS W/CONTRAST: CPT | Mod: 26

## 2020-04-26 PROCEDURE — 96367 TX/PROPH/DG ADDL SEQ IV INF: CPT

## 2020-04-26 PROCEDURE — 81001 URINALYSIS AUTO W/SCOPE: CPT

## 2020-04-26 PROCEDURE — 99284 EMERGENCY DEPT VISIT MOD MDM: CPT | Mod: 25

## 2020-04-26 PROCEDURE — 85027 COMPLETE CBC AUTOMATED: CPT

## 2020-04-26 PROCEDURE — 74177 CT ABD & PELVIS W/CONTRAST: CPT

## 2020-04-26 PROCEDURE — 96365 THER/PROPH/DIAG IV INF INIT: CPT | Mod: XU

## 2020-04-26 RX ORDER — SODIUM CHLORIDE 9 MG/ML
1000 INJECTION INTRAMUSCULAR; INTRAVENOUS; SUBCUTANEOUS ONCE
Refills: 0 | Status: COMPLETED | OUTPATIENT
Start: 2020-04-26 | End: 2020-04-26

## 2020-04-26 RX ORDER — ONDANSETRON 8 MG/1
1 TABLET, FILM COATED ORAL
Qty: 15 | Refills: 0
Start: 2020-04-26 | End: 2020-04-30

## 2020-04-26 RX ORDER — ACETAMINOPHEN 500 MG
650 TABLET ORAL ONCE
Refills: 0 | Status: COMPLETED | OUTPATIENT
Start: 2020-04-26 | End: 2020-04-26

## 2020-04-26 RX ORDER — TRAMADOL HYDROCHLORIDE 50 MG/1
50 TABLET ORAL ONCE
Refills: 0 | Status: DISCONTINUED | OUTPATIENT
Start: 2020-04-26 | End: 2020-04-26

## 2020-04-26 RX ORDER — CIPROFLOXACIN LACTATE 400MG/40ML
400 VIAL (ML) INTRAVENOUS ONCE
Refills: 0 | Status: COMPLETED | OUTPATIENT
Start: 2020-04-26 | End: 2020-04-26

## 2020-04-26 RX ORDER — METRONIDAZOLE 500 MG
1 TABLET ORAL
Qty: 30 | Refills: 0
Start: 2020-04-26 | End: 2020-05-05

## 2020-04-26 RX ORDER — ONDANSETRON 8 MG/1
4 TABLET, FILM COATED ORAL ONCE
Refills: 0 | Status: COMPLETED | OUTPATIENT
Start: 2020-04-26 | End: 2020-04-26

## 2020-04-26 RX ORDER — ACETAMINOPHEN 500 MG
2 TABLET ORAL
Qty: 30 | Refills: 0
Start: 2020-04-26 | End: 2020-04-30

## 2020-04-26 RX ORDER — MOXIFLOXACIN HYDROCHLORIDE TABLETS, 400 MG 400 MG/1
1 TABLET, FILM COATED ORAL
Qty: 20 | Refills: 0
Start: 2020-04-26 | End: 2020-05-05

## 2020-04-26 RX ORDER — IOHEXOL 300 MG/ML
30 INJECTION, SOLUTION INTRAVENOUS ONCE
Refills: 0 | Status: COMPLETED | OUTPATIENT
Start: 2020-04-26 | End: 2020-04-26

## 2020-04-26 RX ORDER — TRAMADOL HYDROCHLORIDE 50 MG/1
1 TABLET ORAL
Qty: 12 | Refills: 0
Start: 2020-04-26 | End: 2020-04-29

## 2020-04-26 RX ORDER — METRONIDAZOLE 500 MG
500 TABLET ORAL ONCE
Refills: 0 | Status: COMPLETED | OUTPATIENT
Start: 2020-04-26 | End: 2020-04-26

## 2020-04-26 RX ADMIN — Medication 100 MILLIGRAM(S): at 17:00

## 2020-04-26 RX ADMIN — Medication 500 MILLIGRAM(S): at 18:04

## 2020-04-26 RX ADMIN — Medication 200 MILLIGRAM(S): at 15:53

## 2020-04-26 RX ADMIN — IOHEXOL 30 MILLILITER(S): 300 INJECTION, SOLUTION INTRAVENOUS at 13:02

## 2020-04-26 RX ADMIN — ONDANSETRON 4 MILLIGRAM(S): 8 TABLET, FILM COATED ORAL at 13:02

## 2020-04-26 RX ADMIN — TRAMADOL HYDROCHLORIDE 50 MILLIGRAM(S): 50 TABLET ORAL at 18:05

## 2020-04-26 RX ADMIN — Medication 400 MILLIGRAM(S): at 16:53

## 2020-04-26 RX ADMIN — SODIUM CHLORIDE 1000 MILLILITER(S): 9 INJECTION INTRAMUSCULAR; INTRAVENOUS; SUBCUTANEOUS at 13:30

## 2020-04-26 RX ADMIN — Medication 650 MILLIGRAM(S): at 13:02

## 2020-04-26 RX ADMIN — SODIUM CHLORIDE 1000 MILLILITER(S): 9 INJECTION INTRAMUSCULAR; INTRAVENOUS; SUBCUTANEOUS at 13:02

## 2020-04-26 NOTE — ED PROVIDER NOTE - OBJECTIVE STATEMENT
58 yo female, hx hypothyroidism, comes to the ED co left lower abd pain, times 4 days worsening last night. Associated nausea, Denies any fevers, chills, vomiting, diarrhea, urinary complaints, c pain, sob or any other symptoms

## 2020-04-26 NOTE — ED PROVIDER NOTE - PSH
Benign tumor of pituitary gland  removed Bilobed Flap Text: The defect edges were debeveled with a #15 scalpel blade.  Given the location of the defect and the proximity to free margins a bilobe flap was deemed most appropriate.  Using a sterile surgical marker, an appropriate bilobe flap drawn around the defect.    The area thus outlined was incised deep to adipose tissue with a #15 scalpel blade.  The skin margins were undermined to an appropriate distance in all directions utilizing iris scissors.

## 2020-04-26 NOTE — ED PROVIDER NOTE - ATTENDING CONTRIBUTION TO CARE
60 yo female, hx hypothyroidism, comes to the ED co left lower abd pain, times 4 days worsening last night. Associated nausea, Denies any fevers, chills, vomiting, diarrhea, urinary complaints, c pain, sob or any other symptoms  Left lower abd pain on exam and left CVA tenderness, will obtain labs, give anti-emetics, pain control ( denies percocet, allergic to morphine, doesn't want NSAID , only tylenol) , give fluids, CT shows  diverticulitis distal descending colon, will dc on ipro, flagyl, Zofran , and GI fu  Dr. Gonzalez:  I have reviewed and discussed with the PA/ resident the case specifics, including the history, physical assessment, evaluation, conclusion, laboratory results, and medical plan. I agree with the contents, and conclusions. I have personally examined, and interviewed the patient.

## 2020-04-26 NOTE — ED ADULT NURSE NOTE - OBJECTIVE STATEMENT
Pt arrives to ER c/o LLQ pain since thursday, worsened in the last day. Pt reports nausea, denies vomiting, reports not having bowel movement for 2 days. Pt also states she has no appetite and hasn't eaten since yesterday. Pt denies urinary symptoms, reports low grade fever since yesterday. Denies chest pain, denies sob, neg cough.

## 2020-04-26 NOTE — ED PROVIDER NOTE - CARE PROVIDER_API CALL
Elvis Menendez (MD)  Gastroenterology; Internal Medicine  [address] [place] [address]  Phone: [phone]  Fax: [phone]  Established Patient  Follow Up Time:

## 2020-04-26 NOTE — ED PROVIDER NOTE - NSFOLLOWUPINSTRUCTIONS_ED_ALL_ED_FT
Follow up with your doctor for a post hospital visits taking  all results from the ER to be reviewed.   Set up follow up to be seen by gastroenterology, dr Menendez, ( number above), take all results to be reviewed   ,   Complete the cipro 500 mg 1 tab twice a day for 10 days as well as the flagyl 500 mg 1 tab 3 times a day.  If needed for nausea take Zofran 4 mg 1 tab every 8 hours and for pain control you can continue the Tylenol 500 mg 2 tab every 8 hours as you declined any other pain medications.       If any increasing pain, nausea, vomiting, fevers, chills, any worsening, concerning  or new signs ro symptoms return to  the ER       Diverticulitis    LO QUE NECESITA SABER:    La diverticulitis es rosalie condición que provoca que bolsas pequeñas a lo riana de lissett intestinos que se conocen alex divertículos, se inflamen o se infecten. [place] se produce a causa de alimentos o rosalie evacuación intestinal dura, o bacteria que se atasca en las bolsas.    INSTRUCCIONES SOBRE EL MICHELLE HOSPITALARIA:    Regrese a la oli de emergencias si:    Usted presenta evacuación intestinal o flujo vaginal con un aroma desagradable que gotea de villalta vagina o se encuentra en villalta orina.      Usted tiene diarrea severa.      Usted orina menos de lo normal o no orina nada en absoluto.      Usted es incapaz de realizar rosalie evacuación intestinal.      Usted no puede dejar de vomitar.      Usted tiene dolor abdominal severo, fiebre y villalta abdomen está más nichelle de lo usual.      Usted nota rafy por primera vez o un aumento de rafy en lissett evacuaciones intestinales.    Comuníquese con villalta médico si:    Siente dolor al orinar.      Lissett síntomas empeoran o no desaparecen.      Usted tiene preguntas o inquietudes acerca de villalta condición o cuidado.    Medicamentos:    Los antibióticosse los podrían administrar para ayudar a tratar rosalie infección bacteriana.      Puede administrarsepodrían administrarse. Pregunte al médico cómo debe alexis nydia medicamento de forma fall. Algunos medicamentos recetados para el dolor contienen acetaminofén. No tome otros medicamentos que contengan acetaminofén sin consultarlo con villalta médico. Demasiado acetaminofeno puede causar daño al hígado. Los medicamentos recetados para el dolor podrían causar estreñimiento. Pregunte a villalta médico alex prevenir o tratar estreñimiento.      [place] lissett medicamentos alex se le haya indicado.Consulte con villalta médico si usted migue que villalta medicamento no le está ayudando o si presenta efectos secundarios. Infórmele si es alérgico a cualquier medicamento. Mantenga rosalie lista actualizada de los medicamentos, las vitaminas y los productos herbales que landon. Incluya los siguientes datos de los medicamentos: cantidad, frecuencia y motivo de administración. Traiga con usted la lista o los envases de las píldoras a lissett citas de seguimiento. Lleve la lista de los medicamentos con usted en geno de rosalie emergencia.    Dieta de líquidos tiffanie:Nydia tipo de dieta incluye líquidos transparentes. Por ejemplo, agua, soda de jengibre, jugo de arándanos o manzana, jugo de frutas congelado o consomé. Continúe con villalta dieta de líquidos tiffanie hasta que lissett síntomas desaparezcan o según indicaciones.    Acuda a lissett consultas de control con villalta médico según le indicaron.Es posible que usted deba regresar a que le realicen rosalie colonoscopía. Rosalie vez que lissett síntomas hayan desaparecido, probablemente necesitará consumir rosalie dieta baja en grasas y michelle en fibras para evitar volver a desarrollar la diverticulitis. Villalta médico o dietista puede ayudarle a crear planes de comida. Anote lissett preguntas para que se acuerde de hacerlas dior lissett visitas.       © Copyright [employer] 2020       back to top                      © Copyright [employer] 2020

## 2020-04-26 NOTE — ED PROVIDER NOTE - PATIENT PORTAL LINK FT
You can access the FollowMyHealth Patient Portal offered by [facility] by registering at the following website: [email or web address]. By joining [employer]’s FollowMyHealth portal, you will also be able to view your health information using other applications (apps) compatible with our system.

## 2020-06-16 ENCOUNTER — APPOINTMENT (OUTPATIENT)
Dept: GASTROENTEROLOGY | Facility: HOSPITAL | Age: 60
End: 2020-06-16

## 2020-06-16 ENCOUNTER — OUTPATIENT (OUTPATIENT)
Dept: OUTPATIENT SERVICES | Facility: HOSPITAL | Age: 60
LOS: 1 days | End: 2020-06-16
Payer: SELF-PAY

## 2020-06-16 DIAGNOSIS — D35.2 BENIGN NEOPLASM OF PITUITARY GLAND: Chronic | ICD-10-CM

## 2020-06-16 PROCEDURE — G0463: CPT

## 2020-06-16 NOTE — REASON FOR VISIT
[Home] : at home, [unfilled] , at the time of the visit. [Medical Office: ([facility] [place])___] : at the medical office located in  [Verbal consent obtained from patient] : the patient, [unfilled] [Post Hospitalization] : a post hospitalization visit [FreeTextEntry1] : diverticulitis, recurrent

## 2020-06-16 NOTE — END OF VISIT
[Time Spent: ___ minutes] : I have spent [unfilled] minutes of time on the encounter. [>50% of the face to face encounter time was spent on counseling and/or coordination of care for ___] : Greater than 50% of the face to face encounter time was spent on counseling and/or coordination of care for [unfilled] [] : Fellow [FreeTextEntry3] : As modified and discussed with patient\par MD DIL[facility] Finley [facility]\par Associate Professor of Medicine\par [facility]\par

## 2020-06-16 NOTE — HISTORY OF PRESENT ILLNESS
[Heartburn] : denies heartburn [Nausea] : denies nausea [Vomiting] : denies vomiting [Diarrhea] : denies diarrhea [Constipation] : denies constipation [Yellow Skin Or Eyes (Jaundice)] : denies jaundice [Abdominal Pain] : denies abdominal pain [Abdominal Swelling] : denies abdominal swelling [Rectal Pain] : denies rectal pain [de-identified] : 59 F with history of H. Pylori (2011) s/p treatment with confirmed eradication, tubular adenomas of colon (last colon 10/2017), hepatic steatosis, diverticulitis (3 courses of antibiotics 6/2017, 7/2017 and 8/2017) and presenting for follow up visit. \par \par Recently went to [facility] ED 4/2020  with CT scan revealing diverticulitis w/o abscess or perforation. completed 10 days of Cipro and Flagyl with significant improvement of he symptoms.\par \par She had EGD in August 2017 that was normal including gastric and duodenal biopsies. \par Currently she denies any abdominal pain, nausea, vomiting. \par She has no change in her bowel habits (has 1 non-bloody bm every day). \par She denies fevers or chills.\par She reports poor appetite - loosing 7 pounds since April 2020.  \par \par She has no family history of colon cancers, stomach cancer or gallstones, but her sister never had a colonoscopy. \par \par Colonoscopy (10/2017):\par Diverticulosis, 2 mm sigmoid polyp (hyperplastic)\par \par Colonoscopy (6/2016):\par Impression: - Non- thrombosed external hemorrhoids found on perianal exam.\par  - Bleeding internal hemorrhoids.\par  - Diverticulosis in the sigmoid colon, in the descending colon and in the \par  transverse colon.\par  - One 8 mm polyp in the transverse colon. Resected and retrieved.\par  - One 10 mm polyp in the transverse colon. Resected and retrieved. Clip was \par  placed.\par  - One 10 mm polyp in the transverse colon. Resected and retrieved. Clip was \par  placed.\par  - The examined portion of the ileum was normal.\par Pathology:\par Final Diagnosis\par 1. Colon, transverse, polyp #1, biopsy\par - Tubular adenoma.\par \par 2. Colon, transverse, polyp #2, biopsy\par - Tubular adenoma(s).\par \par 3. Colon, transverse, polyp #3, biopsy\par - Tubular adenoma.\par \par Upper Endoscopy (3/2011)\par Impression: - LA Grade A esophagitis.\par  - Z-line regular, 37 cm from the incisors.\par  - Gastritis. This was biopsied.\par  - Normal duodenal bulb.\par  - Normal 2nd part of the duodenum. This was biopsied.\par Pathology:\par 1.Stomach, antrum, biopsy\par - chronic active gastritis, severe, Warthin Starry stain is\par positive for H.pylori like\par microorganisms.\par - focal intestinal metaplasia is also identified, negative for\par dysplasia.\par \par \par US abdomen (7/21/17): Hepatic steatosis, no gallstones or gallbladder abnormalities, CBD 4mm\par AST 39, ALT 30, ALP 72, T. bili 0.4. \par Fibroscan F0SO. \par

## 2020-06-16 NOTE — PHYSICAL EXAM
[General Appearance - Alert] : alert [General Appearance - In No Acute Distress] : in no acute distress [General Appearance - Well Nourished] : well nourished [General Appearance - Well Developed] : well developed [General Appearance - Well-Appearing] : healthy appearing [Sclera] : the sclera and conjunctiva were normal [Neck Appearance] : the appearance of the neck was normal [] : no rash [Abnormal Walk] : normal gait [No Focal Deficits] : no focal deficits [Oriented To Time, Place, And Person] : oriented to person, place, and time [FreeTextEntry1] : no swelling

## 2020-06-16 NOTE — REVIEW OF SYSTEMS
[Fever] : no fever [Feeling Poorly] : not feeling poorly [Chills] : no chills [Recent Weight Loss (___ Lbs)] : no recent weight loss [Recent Weight Gain (___ Lbs)] : no recent weight gain [Feeling Tired] : not feeling tired [Eye Pain] : no eye pain [Earache] : no earache [Nasal Discharge] : no nasal discharge [Heart Rate Is Slow] : the heart rate was not slow [Loss Of Hearing] : no hearing loss [Chest Pain] : no chest pain [Heart Rate Is Fast] : the heart rate was not fast [Palpitations] : no palpitations [Shortness Of Breath] : no shortness of breath [Wheezing] : no wheezing [Cough] : no cough [SOB on Exertion] : no shortness of breath during exertion [Abdominal Pain] : no abdominal pain [Vomiting] : no vomiting [Constipation] : no constipation [Diarrhea] : no diarrhea [Dysuria] : no dysuria [Melena] : no melena [Heartburn] : no heartburn [Arthralgias] : no arthralgias [Joint Pain] : no joint pain [Joint Swelling] : no joint swelling [Joint Stiffness] : no joint stiffness [Skin Lesions] : no skin lesions [Skin Wound] : no skin wound [Itching] : no itching [Change In A Mole] : no change in a mole [Dizziness] : no dizziness [Confused] : no confusion [Fainting] : no fainting [Depression] : no depression [Anxiety] : no anxiety [Muscle Weakness] : no muscle weakness [Easy Bleeding] : no tendency for easy bleeding

## 2020-06-16 NOTE — ASSESSMENT
[FreeTextEntry1] : 59 F with history of H. Pylori (2011) s/p treatment with confirmed eradication, tubular adenomas of colon (last colonoscopy 10/2017), hepatic steatosis, recurrent diverticulitis (3 courses of antibiotics 6/2017, 7/2017 and 8/2017) and most recently 04/2020 presenting for follow up visit. \par \par Impression:\par 1) History of three tubular adenomas in 06/2016 ( largest 1 cm )\par 2) Diverticulosis / recurrent diverticulitis ( No FH of diverticulitis ) \par \par Plan:\par -plan for repeat colonoscopy within the next 3-4 months.\par -c/w high fiber diet\par - For diverticulosis the patient is instructed in a high fiber diet and increased fluid intake to reduce constipation.  Encouraged to consume popcorn and to disregard any warnings about seeds or nuts which are unfounded.

## 2020-06-17 DIAGNOSIS — R10.9 UNSPECIFIED ABDOMINAL PAIN: ICD-10-CM

## 2020-06-17 DIAGNOSIS — K57.32 DIVERTICULITIS OF LARGE INTESTINE WITHOUT PERFORATION OR ABSCESS WITHOUT BLEEDING: ICD-10-CM

## 2020-06-17 DIAGNOSIS — D12.6 BENIGN NEOPLASM OF COLON, UNSPECIFIED: ICD-10-CM

## 2020-07-02 ENCOUNTER — OUTPATIENT (OUTPATIENT)
Dept: OUTPATIENT SERVICES | Facility: HOSPITAL | Age: 60
LOS: 1 days | End: 2020-07-02
Payer: SELF-PAY

## 2020-07-02 ENCOUNTER — APPOINTMENT (OUTPATIENT)
Dept: NEUROLOGY | Facility: HOSPITAL | Age: 60
End: 2020-07-02

## 2020-07-02 DIAGNOSIS — D35.2 BENIGN NEOPLASM OF PITUITARY GLAND: Chronic | ICD-10-CM

## 2020-07-02 DIAGNOSIS — R56.9 UNSPECIFIED CONVULSIONS: ICD-10-CM

## 2020-07-02 PROCEDURE — G0463: CPT

## 2020-07-02 RX ORDER — AMITRIPTYLINE HYDROCHLORIDE 25 MG/1
25 TABLET, FILM COATED ORAL
Qty: 30 | Refills: 3 | Status: DISCONTINUED | COMMUNITY
Start: 2017-03-09 | End: 2020-07-02

## 2020-07-06 DIAGNOSIS — G47.00 INSOMNIA, UNSPECIFIED: ICD-10-CM

## 2020-07-06 NOTE — PLAN
[FreeTextEntry1] : 60yo  woman with likely tension type HA 2/2 sleep maintenance disruption.\par \par Plan:\par - D/C Amitriptyline\par - Start Trazodone 50mg qHS\par - Tylenol PRN\par - f/u 2-3mo.

## 2020-07-06 NOTE — HISTORY OF PRESENT ILLNESS
[Home] : at home, [unfilled] , at the time of the visit. [Medical Office: ([facility])___] : at the medical office located in  [Verbal consent obtained from patient] : the patient, [unfilled] [FreeTextEntry1] :  ID: [id number]; Stephan\par \par 60yo pleasant  speaking woman reporting improving headaches. Her worst complaint is insomnia. Reports that headaches are directly related to lack of sleep. States that she struggles to maintain sleep, with sleep episodes approx 45mins-3hrs through the night. HA not present on nights that she sleeps well.\par Previously [place] Zolpidem and [place]azepam [place] good [place]. Not tried any other medications.\par No other complaints. Denies fevers, chills, ns, cp, sob, abd pain, n/v/d/c, weakness, or changes to weight or senses.

## 2020-07-09 ENCOUNTER — APPOINTMENT (OUTPATIENT)
Dept: DERMATOLOGY | Facility: HOSPITAL | Age: 60
End: 2020-07-09

## 2020-09-02 RX ORDER — TRAZODONE HYDROCHLORIDE 50 MG/1
50 TABLET ORAL
Qty: 30 | Refills: 0 | Status: DISCONTINUED | COMMUNITY
Start: 2020-07-02 | End: 2020-09-02

## 2020-09-02 RX ORDER — HYDROXYZINE HYDROCHLORIDE 25 MG/1
25 TABLET ORAL
Qty: 30 | Refills: 0 | Status: DISCONTINUED | COMMUNITY
Start: 2020-04-22 | End: 2020-09-02

## 2020-09-09 ENCOUNTER — OUTPATIENT (OUTPATIENT)
Dept: OUTPATIENT SERVICES | Facility: HOSPITAL | Age: 60
LOS: 1 days | End: 2020-09-09
Payer: SELF-PAY

## 2020-09-09 ENCOUNTER — APPOINTMENT (OUTPATIENT)
Dept: OPHTHALMOLOGY | Facility: CLINIC | Age: 60
End: 2020-09-09

## 2020-09-09 ENCOUNTER — APPOINTMENT (OUTPATIENT)
Dept: FAMILY MEDICINE | Facility: HOSPITAL | Age: 60
End: 2020-09-09

## 2020-09-09 ENCOUNTER — MED ADMIN CHARGE (OUTPATIENT)
Age: 60
End: 2020-09-09

## 2020-09-09 VITALS
TEMPERATURE: 98.1 F | HEART RATE: 88 BPM | RESPIRATION RATE: 12 BRPM | OXYGEN SATURATION: 100 % | DIASTOLIC BLOOD PRESSURE: 82 MMHG | SYSTOLIC BLOOD PRESSURE: 116 MMHG

## 2020-09-09 DIAGNOSIS — R10.13 EPIGASTRIC PAIN: ICD-10-CM

## 2020-09-09 DIAGNOSIS — Z92.29 PERSONAL HISTORY OF OTHER DRUG THERAPY: ICD-10-CM

## 2020-09-09 DIAGNOSIS — M94.0 CHONDROCOSTAL JUNCTION SYNDROME [TIETZE]: ICD-10-CM

## 2020-09-09 DIAGNOSIS — Z09 ENCOUNTER FOR FOLLOW-UP EXAMINATION AFTER COMPLETED TREATMENT FOR CONDITIONS OTHER THAN MALIGNANT NEOPLASM: ICD-10-CM

## 2020-09-09 DIAGNOSIS — H01.9 UNSPECIFIED INFLAMMATION OF EYELID: ICD-10-CM

## 2020-09-09 DIAGNOSIS — Z86.39 PERSONAL HISTORY OF OTHER ENDOCRINE, NUTRITIONAL AND METABOLIC DISEASE: ICD-10-CM

## 2020-09-09 DIAGNOSIS — F43.21 ADJUSTMENT DISORDER WITH DEPRESSED MOOD: ICD-10-CM

## 2020-09-09 DIAGNOSIS — J35.8 OTHER CHRONIC DISEASES OF TONSILS AND ADENOIDS: ICD-10-CM

## 2020-09-09 DIAGNOSIS — B34.9 VIRAL INFECTION, UNSPECIFIED: ICD-10-CM

## 2020-09-09 DIAGNOSIS — M25.511 PAIN IN RIGHT SHOULDER: ICD-10-CM

## 2020-09-09 DIAGNOSIS — K30 FUNCTIONAL DYSPEPSIA: ICD-10-CM

## 2020-09-09 DIAGNOSIS — Z12.39 ENCOUNTER FOR OTHER SCREENING FOR MALIGNANT NEOPLASM OF BREAST: ICD-10-CM

## 2020-09-09 DIAGNOSIS — M25.561 PAIN IN RIGHT KNEE: ICD-10-CM

## 2020-09-09 DIAGNOSIS — Z87.898 PERSONAL HISTORY OF OTHER SPECIFIED CONDITIONS: ICD-10-CM

## 2020-09-09 DIAGNOSIS — Z87.39 PERSONAL HISTORY OF OTHER DISEASES OF THE MUSCULOSKELETAL SYSTEM AND CONNECTIVE TISSUE: ICD-10-CM

## 2020-09-09 DIAGNOSIS — M54.6 PAIN IN THORACIC SPINE: ICD-10-CM

## 2020-09-09 DIAGNOSIS — Z88.9 ALLERGY STATUS TO UNSPECIFIED DRUGS, MEDICAMENTS AND BIOLOGICAL SUBSTANCES: ICD-10-CM

## 2020-09-09 DIAGNOSIS — R31.29 OTHER MICROSCOPIC HEMATURIA: ICD-10-CM

## 2020-09-09 DIAGNOSIS — Z87.19 PERSONAL HISTORY OF OTHER DISEASES OF THE DIGESTIVE SYSTEM: ICD-10-CM

## 2020-09-09 DIAGNOSIS — Z85.038 ENCOUNTER FOR FOLLOW-UP EXAMINATION AFTER COMPLETED TREATMENT FOR MALIGNANT NEOPLASM: ICD-10-CM

## 2020-09-09 DIAGNOSIS — Z86.018 PERSONAL HISTORY OF OTHER BENIGN NEOPLASM: ICD-10-CM

## 2020-09-09 DIAGNOSIS — N64.4 MASTODYNIA: ICD-10-CM

## 2020-09-09 DIAGNOSIS — R13.10 DYSPHAGIA, UNSPECIFIED: ICD-10-CM

## 2020-09-09 DIAGNOSIS — M79.609 PAIN IN UNSPECIFIED LIMB: ICD-10-CM

## 2020-09-09 DIAGNOSIS — R07.89 OTHER CHEST PAIN: ICD-10-CM

## 2020-09-09 DIAGNOSIS — K62.1 RECTAL POLYP: ICD-10-CM

## 2020-09-09 DIAGNOSIS — Z23 ENCOUNTER FOR IMMUNIZATION: ICD-10-CM

## 2020-09-09 DIAGNOSIS — M79.642 PAIN IN LEFT HAND: ICD-10-CM

## 2020-09-09 DIAGNOSIS — D35.2 BENIGN NEOPLASM OF PITUITARY GLAND: Chronic | ICD-10-CM

## 2020-09-09 DIAGNOSIS — R14.0 ABDOMINAL DISTENSION (GASEOUS): ICD-10-CM

## 2020-09-09 DIAGNOSIS — Z08 ENCOUNTER FOR FOLLOW-UP EXAMINATION AFTER COMPLETED TREATMENT FOR MALIGNANT NEOPLASM: ICD-10-CM

## 2020-09-09 DIAGNOSIS — R21 RASH AND OTHER NONSPECIFIC SKIN ERUPTION: ICD-10-CM

## 2020-09-09 DIAGNOSIS — H10.9 UNSPECIFIED CONJUNCTIVITIS: ICD-10-CM

## 2020-09-09 DIAGNOSIS — Z01.419 ENCOUNTER FOR GYNECOLOGICAL EXAMINATION (GENERAL) (ROUTINE) W/OUT ABNORMAL FINDINGS: ICD-10-CM

## 2020-09-09 DIAGNOSIS — K11.20 SIALOADENITIS, UNSPECIFIED: ICD-10-CM

## 2020-09-09 DIAGNOSIS — K57.90 DIVERTICULOSIS OF INTESTINE, PART UNSPECIFIED, W/OUT PERFORATION OR ABSCESS W/OUT BLEEDING: ICD-10-CM

## 2020-09-09 DIAGNOSIS — Z00.00 ENCOUNTER FOR GENERAL ADULT MEDICAL EXAMINATION WITHOUT ABNORMAL FINDINGS: ICD-10-CM

## 2020-09-09 DIAGNOSIS — Z87.09 PERSONAL HISTORY OF OTHER DISEASES OF THE RESPIRATORY SYSTEM: ICD-10-CM

## 2020-09-09 DIAGNOSIS — K20.9 ESOPHAGITIS, UNSPECIFIED: ICD-10-CM

## 2020-09-09 DIAGNOSIS — Z86.69 PERSONAL HISTORY OF OTHER DISEASES OF THE NERVOUS SYSTEM AND SENSE ORGANS: ICD-10-CM

## 2020-09-09 RX ORDER — POLYETHYLENE GLYCOL 3350 AND ELECTROLYTES WITH LEMON FLAVOR 236; 22.74; 6.74; 5.86; 2.97 G/4L; G/4L; G/4L; G/4L; G/4L
236 POWDER, FOR SOLUTION ORAL
Qty: 1 | Refills: 0 | Status: DISCONTINUED | COMMUNITY
Start: 2020-06-16 | End: 2020-09-09

## 2020-09-09 RX ORDER — DOCUSATE SODIUM 100 MG/1
100 CAPSULE ORAL 3 TIMES DAILY
Qty: 90 | Refills: 0 | Status: DISCONTINUED | COMMUNITY
Start: 2020-04-29 | End: 2020-09-09

## 2020-09-09 RX ORDER — SIMETHICONE 80 MG
80 TABLET,CHEWABLE ORAL
Qty: 120 | Refills: 0 | Status: DISCONTINUED | COMMUNITY
Start: 2020-04-29 | End: 2020-09-09

## 2020-09-09 NOTE — HISTORY OF PRESENT ILLNESS
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 \par \par Patient has no complaints at this time, Denies CP, SOB, fevers, chills, constipation, diarrhea.\par  [FreeTextEntry1] : [id number] [FreeTextEntry2] : Veronica [TWNoteComboBox1] : Singaporean

## 2020-09-10 PROCEDURE — G0008: CPT

## 2020-09-10 PROCEDURE — 86803 HEPATITIS C AB TEST: CPT

## 2020-09-10 PROCEDURE — G0463: CPT

## 2020-09-10 PROCEDURE — 87389 HIV-1 AG W/HIV-1&-2 AB AG IA: CPT

## 2020-09-13 ENCOUNTER — APPOINTMENT (OUTPATIENT)
Dept: DISASTER EMERGENCY | Facility: CLINIC | Age: 60
End: 2020-09-13

## 2020-09-13 DIAGNOSIS — Z23 ENCOUNTER FOR IMMUNIZATION: ICD-10-CM

## 2020-09-13 LAB — SARS-COV-2 N GENE NPH QL NAA+PROBE: NOT DETECTED

## 2020-09-15 RX ORDER — SODIUM CHLORIDE 9 MG/ML
1000 INJECTION INTRAMUSCULAR; INTRAVENOUS; SUBCUTANEOUS
Refills: 0 | Status: DISCONTINUED | OUTPATIENT
Start: 2020-09-16 | End: 2020-09-30

## 2020-09-16 ENCOUNTER — OUTPATIENT (OUTPATIENT)
Dept: OUTPATIENT SERVICES | Facility: HOSPITAL | Age: 60
LOS: 1 days | End: 2020-09-16
Payer: MEDICAID

## 2020-09-16 VITALS
OXYGEN SATURATION: 100 % | HEART RATE: 15 BPM | RESPIRATION RATE: 16 BRPM | SYSTOLIC BLOOD PRESSURE: 120 MMHG | DIASTOLIC BLOOD PRESSURE: 80 MMHG

## 2020-09-16 VITALS
RESPIRATION RATE: 16 BRPM | OXYGEN SATURATION: 100 % | TEMPERATURE: 97 F | SYSTOLIC BLOOD PRESSURE: 107 MMHG | HEART RATE: 66 BPM | DIASTOLIC BLOOD PRESSURE: 63 MMHG | HEIGHT: 59 IN | WEIGHT: 123.9 LBS

## 2020-09-16 DIAGNOSIS — D35.2 BENIGN NEOPLASM OF PITUITARY GLAND: Chronic | ICD-10-CM

## 2020-09-16 DIAGNOSIS — K57.32 DIVERTICULITIS OF LARGE INTESTINE WITHOUT PERFORATION OR ABSCESS WITHOUT BLEEDING: ICD-10-CM

## 2020-09-16 PROCEDURE — 45378 DIAGNOSTIC COLONOSCOPY: CPT

## 2020-09-16 PROCEDURE — G0105: CPT

## 2020-09-16 RX ORDER — ACETAMINOPHEN 500 MG
1000 TABLET ORAL ONCE
Refills: 0 | Status: COMPLETED | OUTPATIENT
Start: 2020-09-16 | End: 2020-09-16

## 2020-09-16 RX ADMIN — Medication 1000 MILLIGRAM(S): at 14:58

## 2020-09-16 RX ADMIN — Medication 400 MILLIGRAM(S): at 14:46

## 2020-09-16 RX ADMIN — SODIUM CHLORIDE 30 MILLILITER(S): 9 INJECTION INTRAMUSCULAR; INTRAVENOUS; SUBCUTANEOUS at 13:10

## 2020-09-16 NOTE — PRE PROCEDURE NOTE - PRE PROCEDURE EVALUATION
Attending Physician: Dr. Carlos Alberto Baxter                           Procedure: Upper endoscopy & colonoscopy     Indication for Procedure: Abdominal pain & bloating; history of tubular adenomas of the colon in 2017  ________________________________________________________  PAST MEDICAL & SURGICAL HISTORY:  Hypothyroid    Benign tumor of pituitary gland  removed      ALLERGIES:  morphine (Flushing; Rash)  OxyContin (Unknown)    HOME MEDICATIONS:  traZODone 50 mg oral tablet: orally once a day  Vitamin D3 2000 intl units (50 mcg) oral tablet:     AICD/PPM: [ ] yes   [x ] no    PERTINENT LAB DATA:                      PHYSICAL EXAMINATION:    Height (cm): 149.9  Weight (kg): 56.2  BMI (kg/m2): 25  BSA (m2): 1.5T(C): --  HR: --  BP: --  RR: --  SpO2: --    Constitutional: NAD  HEENT: PERRLA, EOMI,    Neck:  No JVD  Respiratory: CTAB/L  Cardiovascular: S1 and S2  Gastrointestinal: BS+, soft, NT/ND  Extremities: No peripheral edema  Neurological: A/O x 3, no focal deficits  Psychiatric: Normal mood, normal affect  Skin: No rashes    ASA Class: I [ ]  II [x ]  III [ ]  IV [ ]    COMMENTS:    The patient is a suitable candidate for the planned procedure unless box checked [ ]  No, explain:     Attending Physician: Dr. Carlos Alberto Baxter                           Procedure: Colonoscopy     Indication for Procedure: Abdominal pain & bloating; history of tubular adenomas of the colon in 2017  ________________________________________________________  PAST MEDICAL & SURGICAL HISTORY:  Hypothyroid    Benign tumor of pituitary gland  removed      ALLERGIES:  morphine (Flushing; Rash)  OxyContin (Unknown)    HOME MEDICATIONS:  traZODone 50 mg oral tablet: orally once a day  Vitamin D3 2000 intl units (50 mcg) oral tablet:     AICD/PPM: [ ] yes   [x ] no    PERTINENT LAB DATA:                      PHYSICAL EXAMINATION:    Height (cm): 149.9  Weight (kg): 56.2  BMI (kg/m2): 25  BSA (m2): 1.5T(C): --  HR: --  BP: --  RR: --  SpO2: --    Constitutional: NAD  HEENT: PERRLA, EOMI,    Neck:  No JVD  Respiratory: CTAB/L  Cardiovascular: S1 and S2  Gastrointestinal: BS+, soft, NT/ND  Extremities: No peripheral edema  Neurological: A/O x 3, no focal deficits  Psychiatric: Normal mood, normal affect  Skin: No rashes    ASA Class: I [ ]  II [x ]  III [ ]  IV [ ]    COMMENTS:    The patient is a suitable candidate for the planned procedure unless box checked [ ]  No, explain:

## 2020-09-16 NOTE — ASU DISCHARGE PLAN (ADULT/PEDIATRIC) - CARE PROVIDER_API CALL
Dennys Ramirez  GASTROENTEROLOGY  [address] [place] [address], [address]  [place] [address]  Phone: [phone]  Fax: [phone]  Follow Up Time:

## 2020-09-16 NOTE — PRE PROCEDURE NOTE - GENERAL PROCEDURE NAME
----- Message from Fina Calvin MA sent at 9/22/2017  4:02 PM CDT -----  Next available for [facility] (10/17) or [facility](10/12) is. Please Advise.   ----- Message -----  From: Ana Maria Todd PA-C  Sent: 9/22/2017  12:47 PM  To: Fina Calvin MA    Please reach out to patient and mother to schedule with GI on the [facility] ASA for her vomiiting.  I have sent portal message letting them know.l     Upper endoscopy & colonsocopy Colonoscopy

## 2020-09-30 ENCOUNTER — APPOINTMENT (OUTPATIENT)
Dept: CARDIOLOGY | Facility: HOSPITAL | Age: 60
End: 2020-09-30

## 2020-10-20 ENCOUNTER — APPOINTMENT (OUTPATIENT)
Dept: FAMILY MEDICINE | Facility: HOSPITAL | Age: 60
End: 2020-10-20

## 2020-10-20 ENCOUNTER — OUTPATIENT (OUTPATIENT)
Dept: OUTPATIENT SERVICES | Facility: HOSPITAL | Age: 60
LOS: 1 days | End: 2020-10-20
Payer: SELF-PAY

## 2020-10-20 ENCOUNTER — NON-APPOINTMENT (OUTPATIENT)
Age: 60
End: 2020-10-20

## 2020-10-20 VITALS
DIASTOLIC BLOOD PRESSURE: 80 MMHG | HEART RATE: 76 BPM | SYSTOLIC BLOOD PRESSURE: 127 MMHG | OXYGEN SATURATION: 98 % | TEMPERATURE: 98.6 F | RESPIRATION RATE: 12 BRPM

## 2020-10-20 VITALS
OXYGEN SATURATION: 100 % | BODY MASS INDEX: 23.44 KG/M2 | SYSTOLIC BLOOD PRESSURE: 101 MMHG | HEART RATE: 70 BPM | DIASTOLIC BLOOD PRESSURE: 68 MMHG | TEMPERATURE: 98.3 F | RESPIRATION RATE: 18 BRPM | WEIGHT: 120 LBS

## 2020-10-20 DIAGNOSIS — D35.2 BENIGN NEOPLASM OF PITUITARY GLAND: Chronic | ICD-10-CM

## 2020-10-20 DIAGNOSIS — Z00.00 ENCOUNTER FOR GENERAL ADULT MEDICAL EXAMINATION WITHOUT ABNORMAL FINDINGS: ICD-10-CM

## 2020-10-20 NOTE — PHYSICAL EXAM
[No Acute Distress] : no acute distress [No Respiratory Distress] : no respiratory distress  [Well-Appearing] : well-appearing [Clear to Auscultation] : lungs were clear to auscultation bilaterally [Normal Rate] : normal rate  [Regular Rhythm] : with a regular rhythm [Normal S1, S2] : normal S1 and S2 [Soft] : abdomen soft [Non Tender] : non-tender [Non-distended] : non-distended [Normal Bowel Sounds] : normal bowel sounds

## 2020-10-20 NOTE — REVIEW OF SYSTEMS
[Abdominal Pain] : abdominal pain [Nausea] : nausea [Diarrhea] : diarrhea [Fever] : no fever [Chills] : no chills [Chest Pain] : no chest pain [Palpitations] : no palpitations [Shortness Of Breath] : no shortness of breath [Cough] : no cough [Constipation] : no constipation [Heartburn] : no heartburn [Vomiting] : no vomiting [Melena] : no melena

## 2020-10-20 NOTE — ASSESSMENT
[FreeTextEntry1] : \par \par Patient is a 58 y/o female with medical history as stated above here for abdominal pain for 3 weeks:

## 2020-10-20 NOTE — HISTORY OF PRESENT ILLNESS
[FreeTextEntry8] : Patient is a 58 y/o female with a medical history of HLD, diverticulitis of the colon, H. Pylori infection here for an acute visit for 3 week history of abdominal pain. She points to the epigastric region and states that about 3 weeks ago she has been feeling nausea associated with eating, and diffuse gas like pain. No episodes of vomitting. Only 1 episode diarrhea on Sunday. radiation of pain to back. Pain lasts about 3-4 hours and then spontaneously resolves. Has tried OTC pepto bismal with minimal relief. States shes had an H. Pylori infection about 10 years ago which was treated but she cannot remember the exact the treatment. Denies any recent illnesses, sick contacts. No fevers, chills, bloody stool, hematuria, dysuria.

## 2020-10-21 ENCOUNTER — APPOINTMENT (OUTPATIENT)
Dept: FAMILY MEDICINE | Facility: HOSPITAL | Age: 60
End: 2020-10-21

## 2020-10-21 LAB
ALBUMIN SERPL ELPH-MCNC: 4.4 G/DL
ALP BLD-CCNC: 70 U/L
ALT SERPL-CCNC: 19 U/L
AMYLASE/CREAT SERPL: 52 U/L
ANION GAP SERPL CALC-SCNC: 9 MMOL/L
AST SERPL-CCNC: 21 U/L
BASOPHILS # BLD AUTO: 0.02 K/UL
BASOPHILS NFR BLD AUTO: 0.5 %
BILIRUB SERPL-MCNC: 0.3 MG/DL
BUN SERPL-MCNC: 13 MG/DL
CALCIUM SERPL-MCNC: 9.7 MG/DL
CHLORIDE SERPL-SCNC: 102 MMOL/L
CHOLEST SERPL-MCNC: 206 MG/DL
CHOLEST/HDLC SERPL: 4.7 RATIO
CO2 SERPL-SCNC: 32 MMOL/L
CREAT SERPL-MCNC: 0.51 MG/DL
EOSINOPHIL # BLD AUTO: 0.04 K/UL
EOSINOPHIL NFR BLD AUTO: 1 %
GLUCOSE SERPL-MCNC: 125 MG/DL
HCT VFR BLD CALC: 40.3 %
HDLC SERPL-MCNC: 44 MG/DL
HGB BLD-MCNC: 13 G/DL
IMM GRANULOCYTES NFR BLD AUTO: 0.3 %
LDLC SERPL CALC-MCNC: 114 MG/DL
LPL SERPL-CCNC: 30 U/L
LYMPHOCYTES # BLD AUTO: 1.73 K/UL
LYMPHOCYTES NFR BLD AUTO: 43.4 %
MAN DIFF?: NORMAL
MCHC RBC-ENTMCNC: 29.5 PG
MCHC RBC-ENTMCNC: 32.3 GM/DL
MCV RBC AUTO: 91.4 FL
MONOCYTES # BLD AUTO: 0.23 K/UL
MONOCYTES NFR BLD AUTO: 5.8 %
NEUTROPHILS # BLD AUTO: 1.96 K/UL
NEUTROPHILS NFR BLD AUTO: 49 %
PLATELET # BLD AUTO: 271 K/UL
POTASSIUM SERPL-SCNC: 3.6 MMOL/L
PROT SERPL-MCNC: 6.7 G/DL
RBC # BLD: 4.41 M/UL
RBC # FLD: 12.8 %
SODIUM SERPL-SCNC: 143 MMOL/L
TRIGL SERPL-MCNC: 238 MG/DL
WBC # FLD AUTO: 3.99 K/UL

## 2020-10-21 PROCEDURE — 80053 COMPREHEN METABOLIC PANEL: CPT

## 2020-10-21 PROCEDURE — 85025 COMPLETE CBC W/AUTO DIFF WBC: CPT

## 2020-10-21 PROCEDURE — 82150 ASSAY OF AMYLASE: CPT

## 2020-10-21 PROCEDURE — G0463: CPT

## 2020-10-21 PROCEDURE — 83690 ASSAY OF LIPASE: CPT

## 2020-10-21 PROCEDURE — 87338 HPYLORI STOOL AG IA: CPT

## 2020-10-22 DIAGNOSIS — R10.13 EPIGASTRIC PAIN: ICD-10-CM

## 2020-10-23 LAB — H PYLORI AG STL QL: NOT DETECTED

## 2020-10-27 ENCOUNTER — OUTPATIENT (OUTPATIENT)
Dept: OUTPATIENT SERVICES | Facility: HOSPITAL | Age: 60
LOS: 1 days | End: 2020-10-27
Payer: SELF-PAY

## 2020-10-27 ENCOUNTER — APPOINTMENT (OUTPATIENT)
Dept: FAMILY MEDICINE | Facility: HOSPITAL | Age: 60
End: 2020-10-27
Payer: COMMERCIAL

## 2020-10-27 VITALS
HEART RATE: 76 BPM | DIASTOLIC BLOOD PRESSURE: 78 MMHG | WEIGHT: 120 LBS | SYSTOLIC BLOOD PRESSURE: 123 MMHG | OXYGEN SATURATION: 99 % | RESPIRATION RATE: 18 BRPM | TEMPERATURE: 96.8 F | BODY MASS INDEX: 23.44 KG/M2

## 2020-10-27 DIAGNOSIS — Z00.00 ENCOUNTER FOR GENERAL ADULT MEDICAL EXAMINATION WITHOUT ABNORMAL FINDINGS: ICD-10-CM

## 2020-10-27 DIAGNOSIS — D35.2 BENIGN NEOPLASM OF PITUITARY GLAND: Chronic | ICD-10-CM

## 2020-10-29 ENCOUNTER — RESULT REVIEW (OUTPATIENT)
Age: 60
End: 2020-10-29

## 2020-10-29 DIAGNOSIS — R10.13 EPIGASTRIC PAIN: ICD-10-CM

## 2020-10-29 PROCEDURE — G0463: CPT

## 2020-10-29 PROCEDURE — 74176 CT ABD & PELVIS W/O CONTRAST: CPT

## 2020-10-29 PROCEDURE — 74176 CT ABD & PELVIS W/O CONTRAST: CPT | Mod: 26

## 2020-10-29 NOTE — HISTORY OF PRESENT ILLNESS
[FreeTextEntry1] : Follow up  [de-identified] : Patient is a 58 y/o female with a medical history of HLD, diverticulitis of the colon, H. Pylori infection here for follow up visit of 3 week epigastric pain:\par \par We discussed her negative H pylori test and possible chronic pancreatitis.\par \par She is still reporting post pandrial pain, no episodes of watery diarrhea but does state her stools appear slighly loose, unsure if greasy in appearance. Denies bloody bowel movements.\par \par We agreed that the next step would be a CT Abdomen to evaluate for C.Pancreatitis

## 2020-10-29 NOTE — PHYSICAL EXAM
[No Acute Distress] : no acute distress [Well Nourished] : well nourished [Well Developed] : well developed [Well-Appearing] : well-appearing [Normal Sclera/Conjunctiva] : normal sclera/conjunctiva [PERRL] : pupils equal round and reactive to light [No Respiratory Distress] : no respiratory distress  [Clear to Auscultation] : lungs were clear to auscultation bilaterally [Normal Rate] : normal rate  [Regular Rhythm] : with a regular rhythm [Normal S1, S2] : normal S1 and S2 [Soft] : abdomen soft [Non Tender] : non-tender [Non-distended] : non-distended [Normal Bowel Sounds] : normal bowel sounds [No Rash] : no rash

## 2020-10-29 NOTE — PLAN
[FreeTextEntry1] : \par \par \par #Epigastric Pain:\par - H pylori antigen test negative\par - Will order CT abdomen to pursue workup for chronic pancreatitis- will follow up\par - amylase, lipase, CMP,CBC wnl\par - continue famotidine 20 mg QD may consider switching to prilosec\par - Instructed to monitor urine and stool for blood, avoid fatty foods- try to eat smaller meals spread throughout day\par \par \par RTC for follow up of CT results\par \par \par Above discussed with Dr. Mesa

## 2020-10-29 NOTE — REVIEW OF SYSTEMS
[Fever] : no fever [Chills] : no chills [Fatigue] : no fatigue [Chest Pain] : no chest pain [Palpitations] : no palpitations [Shortness Of Breath] : no shortness of breath [Cough] : no cough [Abdominal Pain] : abdominal pain [Constipation] : no constipation [Diarrhea] : diarrhea [Melena] : no melena [Dysuria] : no dysuria [Hematuria] : no hematuria

## 2020-10-29 NOTE — ASSESSMENT
[FreeTextEntry1] : Patient is a 59 y/o female with medical history as stated above here for follow up of epigastric pain: \par \par \par \par

## 2020-10-31 ENCOUNTER — NON-APPOINTMENT (OUTPATIENT)
Age: 60
End: 2020-10-31

## 2020-11-04 ENCOUNTER — OUTPATIENT (OUTPATIENT)
Dept: OUTPATIENT SERVICES | Facility: HOSPITAL | Age: 60
LOS: 1 days | End: 2020-11-04
Payer: COMMERCIAL

## 2020-11-04 ENCOUNTER — APPOINTMENT (OUTPATIENT)
Dept: FAMILY MEDICINE | Facility: HOSPITAL | Age: 60
End: 2020-11-04

## 2020-11-04 VITALS
OXYGEN SATURATION: 99 % | BODY MASS INDEX: 23.44 KG/M2 | TEMPERATURE: 97.3 F | SYSTOLIC BLOOD PRESSURE: 98 MMHG | WEIGHT: 120 LBS | RESPIRATION RATE: 15 BRPM | DIASTOLIC BLOOD PRESSURE: 62 MMHG | HEART RATE: 75 BPM

## 2020-11-04 DIAGNOSIS — Z92.29 PERSONAL HISTORY OF OTHER DRUG THERAPY: ICD-10-CM

## 2020-11-04 DIAGNOSIS — Z00.00 ENCOUNTER FOR GENERAL ADULT MEDICAL EXAMINATION WITHOUT ABNORMAL FINDINGS: ICD-10-CM

## 2020-11-04 DIAGNOSIS — D35.2 BENIGN NEOPLASM OF PITUITARY GLAND: Chronic | ICD-10-CM

## 2020-11-04 DIAGNOSIS — Z00.00 ENCOUNTER FOR GENERAL ADULT MEDICAL EXAMINATION W/OUT ABNORMAL FINDINGS: ICD-10-CM

## 2020-11-04 PROCEDURE — G0101: CPT

## 2020-11-04 PROCEDURE — G0463: CPT

## 2020-11-04 NOTE — PHYSICAL EXAM
[No Acute Distress] : no acute distress [Well Nourished] : well nourished [Normal Oropharynx] : the oropharynx was normal [Normal Rate] : normal rate  [Regular Rhythm] : with a regular rhythm [Normal S1, S2] : normal S1 and S2 [Normal Appearance] : normal in appearance [No Masses] : no palpable masses [No Nipple Discharge] : no nipple discharge [No Axillary Lymphadenopathy] : no axillary lymphadenopathy [Soft] : abdomen soft [Non Tender] : non-tender [Normal Bowel Sounds] : normal bowel sounds [Normal Gait] : normal gait [Normal Affect] : the affect was normal [Normal Insight/Judgement] : insight and judgment were intact

## 2020-11-05 DIAGNOSIS — Z12.39 ENCOUNTER FOR OTHER SCREENING FOR MALIGNANT NEOPLASM OF BREAST: ICD-10-CM

## 2020-11-05 NOTE — PLAN
[FreeTextEntry1] : \par Epigastric Pain\par - GI referral ordered 10/30 reprinted and given to patient\par \par Discussed with Dr. Tillman.

## 2020-11-05 NOTE — REVIEW OF SYSTEMS
[Abdominal Pain] : abdominal pain [Nausea] : nausea [Negative] : Constitutional [Chest Pain] : no chest pain [Palpitations] : no palpitations [Constipation] : no constipation [Diarrhea] : diarrhea [Vomiting] : no vomiting [Heartburn] : no heartburn [Melena] : no melena

## 2020-11-07 ENCOUNTER — NON-APPOINTMENT (OUTPATIENT)
Age: 60
End: 2020-11-07

## 2020-11-07 ENCOUNTER — EMERGENCY (EMERGENCY)
Facility: HOSPITAL | Age: 60
LOS: 1 days | Discharge: HOME | End: 2020-11-07
Attending: EMERGENCY MEDICINE | Admitting: INTERNAL MEDICINE
Payer: MEDICAID

## 2020-11-07 VITALS
SYSTOLIC BLOOD PRESSURE: 134 MMHG | RESPIRATION RATE: 18 BRPM | OXYGEN SATURATION: 99 % | DIASTOLIC BLOOD PRESSURE: 83 MMHG | HEART RATE: 86 BPM

## 2020-11-07 VITALS
SYSTOLIC BLOOD PRESSURE: 124 MMHG | WEIGHT: 119.05 LBS | TEMPERATURE: 99 F | HEIGHT: 59 IN | OXYGEN SATURATION: 100 % | HEART RATE: 81 BPM | RESPIRATION RATE: 16 BRPM | DIASTOLIC BLOOD PRESSURE: 81 MMHG

## 2020-11-07 DIAGNOSIS — R10.9 UNSPECIFIED ABDOMINAL PAIN: ICD-10-CM

## 2020-11-07 DIAGNOSIS — D35.2 BENIGN NEOPLASM OF PITUITARY GLAND: Chronic | ICD-10-CM

## 2020-11-07 LAB
ALBUMIN SERPL ELPH-MCNC: 3.6 G/DL — SIGNIFICANT CHANGE UP (ref 3.3–5)
ALP SERPL-CCNC: 73 U/L — SIGNIFICANT CHANGE UP (ref 40–120)
ALT FLD-CCNC: 28 U/L — SIGNIFICANT CHANGE UP (ref 10–45)
ANION GAP SERPL CALC-SCNC: -1 MMOL/L — LOW (ref 5–17)
APPEARANCE UR: CLEAR — SIGNIFICANT CHANGE UP
AST SERPL-CCNC: 26 U/L — SIGNIFICANT CHANGE UP (ref 10–40)
BACTERIA # UR AUTO: NEGATIVE /HPF — SIGNIFICANT CHANGE UP
BASOPHILS # BLD AUTO: 0.03 K/UL — SIGNIFICANT CHANGE UP (ref 0–0.2)
BASOPHILS NFR BLD AUTO: 0.7 % — SIGNIFICANT CHANGE UP (ref 0–2)
BILIRUB SERPL-MCNC: 0.2 MG/DL — SIGNIFICANT CHANGE UP (ref 0.2–1.2)
BILIRUB UR-MCNC: NEGATIVE — SIGNIFICANT CHANGE UP
BUN SERPL-MCNC: 17 MG/DL — SIGNIFICANT CHANGE UP (ref 7–23)
CALCIUM SERPL-MCNC: 9.4 MG/DL — SIGNIFICANT CHANGE UP (ref 8.4–10.5)
CHLORIDE SERPL-SCNC: 102 MMOL/L — SIGNIFICANT CHANGE UP (ref 96–108)
CO2 SERPL-SCNC: 33 MMOL/L — HIGH (ref 22–31)
COLOR SPEC: YELLOW — SIGNIFICANT CHANGE UP
CREAT SERPL-MCNC: 0.79 MG/DL — SIGNIFICANT CHANGE UP (ref 0.5–1.3)
DIFF PNL FLD: ABNORMAL
EOSINOPHIL # BLD AUTO: 0.04 K/UL — SIGNIFICANT CHANGE UP (ref 0–0.5)
EOSINOPHIL NFR BLD AUTO: 0.9 % — SIGNIFICANT CHANGE UP (ref 0–6)
EPI CELLS # UR: SIGNIFICANT CHANGE UP
GLUCOSE SERPL-MCNC: 144 MG/DL — HIGH (ref 70–99)
GLUCOSE UR QL: NEGATIVE — SIGNIFICANT CHANGE UP
HCT VFR BLD CALC: 38.7 % — SIGNIFICANT CHANGE UP (ref 34.5–45)
HGB BLD-MCNC: 13.2 G/DL — SIGNIFICANT CHANGE UP (ref 11.5–15.5)
IMM GRANULOCYTES NFR BLD AUTO: 0 % — SIGNIFICANT CHANGE UP (ref 0–1.5)
KETONES UR-MCNC: NEGATIVE — SIGNIFICANT CHANGE UP
LEUKOCYTE ESTERASE UR-ACNC: NEGATIVE — SIGNIFICANT CHANGE UP
LIDOCAIN IGE QN: 124 U/L — SIGNIFICANT CHANGE UP (ref 73–393)
LYMPHOCYTES # BLD AUTO: 1.97 K/UL — SIGNIFICANT CHANGE UP (ref 1–3.3)
LYMPHOCYTES # BLD AUTO: 44.6 % — HIGH (ref 13–44)
MCHC RBC-ENTMCNC: 30.2 PG — SIGNIFICANT CHANGE UP (ref 27–34)
MCHC RBC-ENTMCNC: 34.1 GM/DL — SIGNIFICANT CHANGE UP (ref 32–36)
MCV RBC AUTO: 88.6 FL — SIGNIFICANT CHANGE UP (ref 80–100)
MONOCYTES # BLD AUTO: 0.28 K/UL — SIGNIFICANT CHANGE UP (ref 0–0.9)
MONOCYTES NFR BLD AUTO: 6.3 % — SIGNIFICANT CHANGE UP (ref 2–14)
NEUTROPHILS # BLD AUTO: 2.1 K/UL — SIGNIFICANT CHANGE UP (ref 1.8–7.4)
NEUTROPHILS NFR BLD AUTO: 47.5 % — SIGNIFICANT CHANGE UP (ref 43–77)
NITRITE UR-MCNC: NEGATIVE — SIGNIFICANT CHANGE UP
NRBC # BLD: 0 /100 WBCS — SIGNIFICANT CHANGE UP (ref 0–0)
PH UR: 6.5 — SIGNIFICANT CHANGE UP (ref 5–8)
PLATELET # BLD AUTO: 271 K/UL — SIGNIFICANT CHANGE UP (ref 150–400)
POTASSIUM SERPL-MCNC: 4.3 MMOL/L — SIGNIFICANT CHANGE UP (ref 3.5–5.3)
POTASSIUM SERPL-SCNC: 4.3 MMOL/L — SIGNIFICANT CHANGE UP (ref 3.5–5.3)
PROT SERPL-MCNC: 7.2 G/DL — SIGNIFICANT CHANGE UP (ref 6–8.3)
PROT UR-MCNC: NEGATIVE — SIGNIFICANT CHANGE UP
RBC # BLD: 4.37 M/UL — SIGNIFICANT CHANGE UP (ref 3.8–5.2)
RBC # FLD: 12.2 % — SIGNIFICANT CHANGE UP (ref 10.3–14.5)
RBC CASTS # UR COMP ASSIST: SIGNIFICANT CHANGE UP /HPF (ref 0–4)
SODIUM SERPL-SCNC: 134 MMOL/L — LOW (ref 135–145)
SP GR SPEC: 1.01 — SIGNIFICANT CHANGE UP (ref 1.01–1.02)
UROBILINOGEN FLD QL: NEGATIVE — SIGNIFICANT CHANGE UP
WBC # BLD: 4.42 K/UL — SIGNIFICANT CHANGE UP (ref 3.8–10.5)
WBC # FLD AUTO: 4.42 K/UL — SIGNIFICANT CHANGE UP (ref 3.8–10.5)
WBC UR QL: NEGATIVE /HPF — SIGNIFICANT CHANGE UP (ref 0–5)

## 2020-11-07 PROCEDURE — 81001 URINALYSIS AUTO W/SCOPE: CPT

## 2020-11-07 PROCEDURE — 80053 COMPREHEN METABOLIC PANEL: CPT

## 2020-11-07 PROCEDURE — 96361 HYDRATE IV INFUSION ADD-ON: CPT

## 2020-11-07 PROCEDURE — 96374 THER/PROPH/DIAG INJ IV PUSH: CPT

## 2020-11-07 PROCEDURE — 83690 ASSAY OF LIPASE: CPT

## 2020-11-07 PROCEDURE — 85025 COMPLETE CBC W/AUTO DIFF WBC: CPT

## 2020-11-07 PROCEDURE — 36415 COLL VENOUS BLD VENIPUNCTURE: CPT

## 2020-11-07 PROCEDURE — 99284 EMERGENCY DEPT VISIT MOD MDM: CPT | Mod: 25

## 2020-11-07 PROCEDURE — 87086 URINE CULTURE/COLONY COUNT: CPT

## 2020-11-07 PROCEDURE — 74176 CT ABD & PELVIS W/O CONTRAST: CPT | Mod: 26

## 2020-11-07 PROCEDURE — 99285 EMERGENCY DEPT VISIT HI MDM: CPT

## 2020-11-07 PROCEDURE — 74176 CT ABD & PELVIS W/O CONTRAST: CPT

## 2020-11-07 RX ORDER — SODIUM CHLORIDE 9 MG/ML
1000 INJECTION INTRAMUSCULAR; INTRAVENOUS; SUBCUTANEOUS ONCE
Refills: 0 | Status: COMPLETED | OUTPATIENT
Start: 2020-11-07 | End: 2020-11-07

## 2020-11-07 RX ORDER — KETOROLAC TROMETHAMINE 30 MG/ML
15 SYRINGE (ML) INJECTION ONCE
Refills: 0 | Status: DISCONTINUED | OUTPATIENT
Start: 2020-11-07 | End: 2020-11-07

## 2020-11-07 RX ORDER — ONDANSETRON 8 MG/1
1 TABLET, FILM COATED ORAL
Qty: 9 | Refills: 0
Start: 2020-11-07 | End: 2020-11-09

## 2020-11-07 RX ADMIN — Medication 15 MILLIGRAM(S): at 19:42

## 2020-11-07 RX ADMIN — SODIUM CHLORIDE 1000 MILLILITER(S): 9 INJECTION INTRAMUSCULAR; INTRAVENOUS; SUBCUTANEOUS at 20:43

## 2020-11-07 RX ADMIN — SODIUM CHLORIDE 2000 MILLILITER(S): 9 INJECTION INTRAMUSCULAR; INTRAVENOUS; SUBCUTANEOUS at 19:43

## 2020-11-07 RX ADMIN — Medication 15 MILLIGRAM(S): at 19:57

## 2020-11-07 NOTE — ED PROVIDER NOTE - PATIENT PORTAL LINK FT
You can access the FollowMyHealth Patient Portal offered by [facility] by registering at the following website: [email or web address]. By joining [employer]’s FollowMyHealth portal, you will also be able to view your health information using other applications (apps) compatible with our system.

## 2020-11-07 NOTE — ED ADULT NURSE NOTE - OBJECTIVE STATEMENT
Pt states she has 10/10 on right side of abdomen that radiates to her back. Pt states this has been going on for about 1 week. Pt states when she eats the pain is worse. Earlier in the week she was vomited and had diarrhea. Today she feels nauseous only. Pt states she took tylenol today at 5pm, but it did not help. : Franci [id number]

## 2020-11-07 NOTE — ED PROVIDER NOTE - CARE PROVIDER_API CALL
LOUISA MAYS  GASTROENTEROLOGY  [address], [place] [address]  Phone: [phone]  Fax: [phone]  Follow Up Time: 1-3 Days

## 2020-11-07 NOTE — ED PROVIDER NOTE - NSFOLLOWUPINSTRUCTIONS_ED_ALL_ED_FT
1. Follow up with your primary doctor in 1-2 days. Additionally please follow up with our gastroenterologist Dr. Copeland within 2-3 days. Please call to schedule an appointment.   2. Please bring a copy of all your results with you to your appointments.   3. Take Tylenol 975 mg every 6 hours as needed for pain. Take Motrin 600 mg every 8 hours with food for additional pain relief.   4. Take 1 tab Zofran every 8 hours as needed for nausea.   5. Return to the ED immediately if you develop any new/worsening symptoms including worsening pain, inability to tolerate food/water, vomiting, chest pain, shortness of breath or any other concerning symptoms. 1. Follow up with your primary doctor in 1-2 days. Additionally please follow up with our gastroenterologist Dr. Copeland within 2-3 days. Please call to schedule an appointment.   2. Please bring a copy of all your results with you to your appointments.   3. Take Tylenol 975 mg every 6 hours as needed for pain. Take Motrin 600 mg every 8 hours with food for additional pain relief.   4. Take 1 tab Zofran every 8 hours as needed for nausea.   5. Return to the ED immediately if you develop any new/worsening symptoms including worsening pain, inability to tolerate food/water, vomiting, chest pain, shortness of breath or any other concerning symptoms.      Dolor abdominal norberto    LO QUE NECESITA SABER:    No se puede encontrar la causa del dolor abdominal. Si se encuentra rosalie causa, el tratamiento dependerá de cuál es sirisha causa.    INSTRUCCIONES SOBRE EL MICHELLE HOSPITALARIA:    Busque atención médica de inmediato si:  •Usted no puede dejar de vomitar o vomita rafy.      •Usted tiene rafy en las evacuaciones intestinales o estas tienen un aspecto alquitranado.      •Usted tiene sangrado por villalta recto.      •El tamaño del abdomen es más nichelle de lo normal y se siente lupe y más doloroso.      •Tiene dolor abdominal intenso.      •Usted les de tener flatulencias y evacuaciones intestinales.      •Usted se siente mareado, débil o tiene sensación de desmayo.      Comuníquese con villalta médico si:  •Tiene fiebre.      •Tiene nuevos signos y síntomas.      •Lissett síntomas no mejoran con el tratamiento.      •Usted tiene preguntas o inquietudes acerca de villalta condición o cuidado.      Los medicamentosestos pueden administrarse para disminuir el dolor, tratar rosalie infección y manejar lissett síntomas. Klemme lissett medicamentos alex se le haya indicado. Llame a villalta médico si usted piensa que el medicamento no está ayudando o si tiene efectos secundarios. Infórmele si es alérgico a cualquier medicamento. Mantenga rosalie lista actualizada de los medicamentos, las vitaminas y los productos herbales que landon. Incluya los siguientes datos de los medicamentos: cantidad, frecuencia y motivo de administración. Traiga con usted la lista o los envases de las píldoras a lissett citas de seguimiento. Lleve la lista de los medicamentos con usted en geno de rosalie emergencia.    El manejo de lissett síntomas:  •Aplique calorsobre el abdomen de 20 a 30 minutos cada 2 horas por los días que le indiquen. El calor ayuda a disminuir el dolor y los espasmos musculares.    •Controle villalta estrés.El estrés puede causar dolor abdominal. Villalta médico puede recomendarle técnicas de relajación y ejercicios de respiración profunda para ayudar a disminuir el estrés. Villalta médico puede recomendarle que hable con alguien sobre villalta estrés o ansiedad, alex un consejero o un amigo de confianza. Duerma lo suficiente y realice ejercicio regularmente.    •Limite o no consuma bebidas alcohólicas.El alcohol puede empeorar el dolor abdominal. Pregunte a villalta médico si usted puede alexis alcohol. Pregunte cuanto es la cantidad fall para usted alexis.    •No fume.La nicotina y otros químicos en los cigarrillos pueden dañarle el esófago y el estómago. Pida información a villalta médico si usted actualmente fuma y necesita ayuda para dejar de fumar. Los cigarrillos electrónicos o el tabaco sin humo igualmente contienen nicotina. Consulte con villalta médico antes de utilizar estos productos.    Realice cambios en los alimentos que consume según se le indique:No coma alimentos que causan dolor abdominal u otros síntomas. Ingiera comidas pequeñas, más a menudo.   •Coma más alimentos ricos en fibra si tiene estreñimiento. Los alimentos altos en fibra incluyen frutas, verduras, alimentos de grano integral y legumbres.    •No coma alimentos que causan gas si tiene distensión. Por ejemplo, brócoli, col y coliflor. No joselito gaseosas o bebidas carbonadas, ya que también pueden provocarle gases.    •No consuma alimentos o bebidas que contienen sorbitol o fructosa si tiene diarrea y distensión. Algunos ejemplos son jugos de frutas, dulces, mermeladas y gomas de mascar sin azúcar.    •No coma alimentos altos en grasas, alex comidas fritas, hamburguesas con queso, salchichas y postres.    •Limite o no tome cafeína. La cafeína puede empeorar los síntomas, alex la acidez o las náuseas.    •Joselito suficientes líquidos para evitar la deshidratación causada por la diarrea o los vómitos. Pregunte a villalta médico sobre la cantidad de líquido que necesita alexis todos los liam y cuáles le recomienda.    Acuda a lissett consultas de control con villalta médico según le indicaron.Anote lissett preguntas para que se acuerde de hacerlas dior lissett visitas.

## 2020-11-07 NOTE — ED PROVIDER NOTE - PROGRESS NOTE DETAILS
labs non-actionable. CT w/ diverticulosis, no evidence diverticulitis, appendix normal, no evidence kidney stone. UA noninfectious. Sxs could possibly be 2/2 biliary colic as pt endorses relation to food intake. GB normal on CT though possible she has gallstones that area difficulty to see, regardless pain improved s/p toradol, LFTs wnl, no leukocytosis, afebrile.  Svetlana ID #[id number] used to discuss all results with pt at bedside and need for outpt GI f/u this week. Pt feels comfortable going home and trying to manage symptoms at home. Will prescribe zofran, counseled pt extensively on avoiding triggering foods (spicy, fatty, dairy), and need for outpt f/u. Additionally counseled extensively on strict return precautions and advised should return with any worsening symptoms. Pt acknowledged understanding. Has outpt endoscopy scheduled for 12/1 already, however will d/w her doctor and try to move up. Attending Dr. Brooks aware, cleared for d/c home. - Joe Lorenzo PA-C

## 2020-11-07 NOTE — ED PROVIDER NOTE - CLINICAL SUMMARY MEDICAL DECISION MAKING FREE TEXT BOX
61 yo female PMhx diverticulitis presents to the ED c/o R sided abdominal pain and R flank pain x 1 week. When sxs first started had associated nausea, vomiting and nonbloody diarrhea, now just has mild nausea. Pain is constant, worse after eating, has been taking tylenol at home w/o relief. Does not feel like her previous diverticulitis. Denies fever/chills, cp, sob, urinary urgency, frequency, dysuria, hematuria, fall/trauma, BRBPR, melena, dizziness, weakness, hx abdominal surgery....On exam uncomfortable appearing, +ttp R side abdomen, no peritoneal signs. DDx includes gallstones, nephrolithiasis, diverticulitis, pancreatitis. Will obtain labs, CTAP, pain control and reassess.

## 2020-11-07 NOTE — ED ADULT NURSE NOTE - NSFALLRSKINDICATORS_ED_ALL_ED
Rx request for quinapril.    LOV 11/12/18  Last Refill/Quantity 11/5/18 for #90 with 1  Last pertinent labs 06/07/18 CMP   no

## 2020-11-07 NOTE — ED PROVIDER NOTE - ATTENDING CONTRIBUTION TO CARE
I have personally seen and examined this patient. I have fully participated in the care of this patient. I have reviewed all pertinent clinical information, including history physical exam, plan and the PA's note and agree except as noted  61 yo female PMhx diverticulitis presents to the ED c/o R sided abdominal pain and R flank pain x 1 week. When sxs first started had associated nausea, vomiting and nonbloody diarrhea, now just has mild nausea. Pain is constant, worse after eating, has been taking tylenol at home w/o relief. Does not feel like her previous diverticulitis. Denies fever/chills, cp, sob, urinary urgency, frequency, dysuria, hematuria, fall/trauma, BRBPR, melena, dizziness, weakness, hx abdominal surgery.  PE : NAD  abdomen : soft . NT , neg Cervantes's

## 2020-11-07 NOTE — ED PROVIDER NOTE - OBJECTIVE STATEMENT
61 yo female PMhx diverticulitis presents to the ED c/o R sided abdominal pain and R flank pain x 1 week. When sxs first started had associated nausea, vomiting and nonbloody diarrhea, now just has mild nausea. Pain is constant, worse after eating, has been taking tylenol at home w/o relief. Does not feel like her previous diverticulitis. Denies fever/chills, cp, sob, urinary urgency, frequency, dysuria, hematuria, fall/trauma, BRBPR, melena, dizziness, weakness, hx abdominal surgery.

## 2020-11-07 NOTE — ED PROVIDER NOTE - ABDOMINAL TENDER
right upper quadrant/right lower quadrant/+R sided abdominal ttp, upper>lower. no rebound, guarding or rigidity. right upper quadrant/right lower quadrant/+R sided abdominal ttp, upper>lower. no rebound, guarding or rigidity. No rash or vesicles overlying area of pain.

## 2020-11-07 NOTE — ED ADULT TRIAGE NOTE - CHIEF COMPLAINT QUOTE
Pt has recent history of RMQ pain radiating to her back. she states she had CT scan 1 week ago.  Pt states she has nausea and decreased appetitive, no vomiting.

## 2020-11-08 LAB
CULTURE RESULTS: SIGNIFICANT CHANGE UP
SPECIMEN SOURCE: SIGNIFICANT CHANGE UP

## 2020-11-09 ENCOUNTER — NON-APPOINTMENT (OUTPATIENT)
Age: 60
End: 2020-11-09

## 2020-11-09 PROBLEM — K57.92 DIVERTICULITIS OF INTESTINE, PART UNSPECIFIED, WITHOUT PERFORATION OR ABSCESS WITHOUT BLEEDING: Chronic | Status: ACTIVE | Noted: 2020-11-07

## 2020-11-10 ENCOUNTER — APPOINTMENT (OUTPATIENT)
Dept: ULTRASOUND IMAGING | Facility: HOSPITAL | Age: 60
End: 2020-11-10
Payer: COMMERCIAL

## 2020-11-10 ENCOUNTER — OUTPATIENT (OUTPATIENT)
Dept: OUTPATIENT SERVICES | Facility: HOSPITAL | Age: 60
LOS: 1 days | End: 2020-11-10
Payer: MEDICAID

## 2020-11-10 DIAGNOSIS — R10.11 RIGHT UPPER QUADRANT PAIN: ICD-10-CM

## 2020-11-10 DIAGNOSIS — D35.2 BENIGN NEOPLASM OF PITUITARY GLAND: Chronic | ICD-10-CM

## 2020-11-10 PROCEDURE — 76700 US EXAM ABDOM COMPLETE: CPT

## 2020-11-10 PROCEDURE — 76700 US EXAM ABDOM COMPLETE: CPT | Mod: 26

## 2020-11-17 ENCOUNTER — RESULT REVIEW (OUTPATIENT)
Age: 60
End: 2020-11-17

## 2020-11-23 ENCOUNTER — RESULT REVIEW (OUTPATIENT)
Age: 60
End: 2020-11-23

## 2020-11-23 ENCOUNTER — OUTPATIENT (OUTPATIENT)
Dept: OUTPATIENT SERVICES | Facility: HOSPITAL | Age: 60
LOS: 1 days | End: 2020-11-23
Payer: COMMERCIAL

## 2020-11-23 ENCOUNTER — APPOINTMENT (OUTPATIENT)
Dept: MAMMOGRAPHY | Facility: HOSPITAL | Age: 60
End: 2020-11-23
Payer: COMMERCIAL

## 2020-11-23 DIAGNOSIS — D35.2 BENIGN NEOPLASM OF PITUITARY GLAND: Chronic | ICD-10-CM

## 2020-11-23 DIAGNOSIS — Z12.39 ENCOUNTER FOR OTHER SCREENING FOR MALIGNANT NEOPLASM OF BREAST: ICD-10-CM

## 2020-11-23 PROCEDURE — 77063 BREAST TOMOSYNTHESIS BI: CPT

## 2020-11-23 PROCEDURE — 77063 BREAST TOMOSYNTHESIS BI: CPT | Mod: 26

## 2020-11-23 PROCEDURE — 77067 SCR MAMMO BI INCL CAD: CPT

## 2020-11-23 PROCEDURE — 77067 SCR MAMMO BI INCL CAD: CPT | Mod: 26

## 2020-11-24 ENCOUNTER — RESULT REVIEW (OUTPATIENT)
Age: 60
End: 2020-11-24

## 2020-11-24 ENCOUNTER — APPOINTMENT (OUTPATIENT)
Dept: MAMMOGRAPHY | Facility: HOSPITAL | Age: 60
End: 2020-11-24
Payer: COMMERCIAL

## 2020-11-24 ENCOUNTER — OUTPATIENT (OUTPATIENT)
Dept: OUTPATIENT SERVICES | Facility: HOSPITAL | Age: 60
LOS: 1 days | End: 2020-11-24
Payer: COMMERCIAL

## 2020-11-24 DIAGNOSIS — Z00.8 ENCOUNTER FOR OTHER GENERAL EXAMINATION: ICD-10-CM

## 2020-11-24 DIAGNOSIS — D35.2 BENIGN NEOPLASM OF PITUITARY GLAND: Chronic | ICD-10-CM

## 2020-11-24 PROCEDURE — G0279: CPT | Mod: 26

## 2020-11-24 PROCEDURE — 77065 DX MAMMO INCL CAD UNI: CPT | Mod: 26,RT

## 2020-11-24 PROCEDURE — 77065 DX MAMMO INCL CAD UNI: CPT

## 2020-11-24 PROCEDURE — G0279: CPT

## 2020-12-01 ENCOUNTER — APPOINTMENT (OUTPATIENT)
Dept: GASTROENTEROLOGY | Facility: HOSPITAL | Age: 60
End: 2020-12-01

## 2020-12-01 ENCOUNTER — OUTPATIENT (OUTPATIENT)
Dept: OUTPATIENT SERVICES | Facility: HOSPITAL | Age: 60
LOS: 1 days | End: 2020-12-01
Payer: SELF-PAY

## 2020-12-01 DIAGNOSIS — D35.2 BENIGN NEOPLASM OF PITUITARY GLAND: Chronic | ICD-10-CM

## 2020-12-01 DIAGNOSIS — R10.11 RIGHT UPPER QUADRANT PAIN: ICD-10-CM

## 2020-12-01 PROCEDURE — 78227 HEPATOBIL SYST IMAGE W/DRUG: CPT

## 2020-12-01 PROCEDURE — A9537: CPT

## 2020-12-01 PROCEDURE — 78227 HEPATOBIL SYST IMAGE W/DRUG: CPT | Mod: 26

## 2020-12-01 RX ORDER — SINCALIDE 5 UG/5ML
1.1 INJECTION, POWDER, LYOPHILIZED, FOR SOLUTION INTRAVENOUS ONCE
Refills: 0 | Status: DISCONTINUED | OUTPATIENT
Start: 2020-12-01 | End: 2020-12-01

## 2020-12-01 RX ORDER — SINCALIDE 5 UG/5ML
1.1 INJECTION, POWDER, LYOPHILIZED, FOR SOLUTION INTRAVENOUS ONCE
Refills: 0 | Status: DISCONTINUED | OUTPATIENT
Start: 2020-12-01 | End: 2020-12-15

## 2020-12-04 ENCOUNTER — RESULT REVIEW (OUTPATIENT)
Age: 60
End: 2020-12-04

## 2020-12-04 ENCOUNTER — OUTPATIENT (OUTPATIENT)
Dept: OUTPATIENT SERVICES | Facility: HOSPITAL | Age: 60
LOS: 1 days | End: 2020-12-04
Payer: SELF-PAY

## 2020-12-04 ENCOUNTER — APPOINTMENT (OUTPATIENT)
Dept: MAMMOGRAPHY | Facility: CLINIC | Age: 60
End: 2020-12-04
Payer: SELF-PAY

## 2020-12-04 DIAGNOSIS — D35.2 BENIGN NEOPLASM OF PITUITARY GLAND: Chronic | ICD-10-CM

## 2020-12-04 DIAGNOSIS — R92.8 OTHER ABNORMAL AND INCONCLUSIVE FINDINGS ON DIAGNOSTIC IMAGING OF BREAST: ICD-10-CM

## 2020-12-04 PROCEDURE — 77065 DX MAMMO INCL CAD UNI: CPT | Mod: 26,RT

## 2020-12-04 PROCEDURE — 88360 TUMOR IMMUNOHISTOCHEM/MANUAL: CPT | Mod: 26

## 2020-12-04 PROCEDURE — 88342 IMHCHEM/IMCYTCHM 1ST ANTB: CPT | Mod: 26,59

## 2020-12-04 PROCEDURE — 19081 BX BREAST 1ST LESION STRTCTC: CPT

## 2020-12-04 PROCEDURE — 88305 TISSUE EXAM BY PATHOLOGIST: CPT

## 2020-12-04 PROCEDURE — A4648: CPT

## 2020-12-04 PROCEDURE — 88360 TUMOR IMMUNOHISTOCHEM/MANUAL: CPT

## 2020-12-04 PROCEDURE — 88305 TISSUE EXAM BY PATHOLOGIST: CPT | Mod: 26

## 2020-12-04 PROCEDURE — 77065 DX MAMMO INCL CAD UNI: CPT

## 2020-12-04 PROCEDURE — 19081 BX BREAST 1ST LESION STRTCTC: CPT | Mod: RT

## 2020-12-04 PROCEDURE — 88341 IMHCHEM/IMCYTCHM EA ADD ANTB: CPT | Mod: 26,59

## 2020-12-04 PROCEDURE — 88341 IMHCHEM/IMCYTCHM EA ADD ANTB: CPT

## 2020-12-09 ENCOUNTER — NON-APPOINTMENT (OUTPATIENT)
Age: 60
End: 2020-12-09

## 2020-12-09 ENCOUNTER — APPOINTMENT (OUTPATIENT)
Dept: FAMILY MEDICINE | Facility: HOSPITAL | Age: 60
End: 2020-12-09

## 2020-12-09 ENCOUNTER — OUTPATIENT (OUTPATIENT)
Dept: OUTPATIENT SERVICES | Facility: HOSPITAL | Age: 60
LOS: 1 days | End: 2020-12-09
Payer: SELF-PAY

## 2020-12-09 VITALS
OXYGEN SATURATION: 100 % | WEIGHT: 115 LBS | TEMPERATURE: 98 F | BODY MASS INDEX: 22.46 KG/M2 | DIASTOLIC BLOOD PRESSURE: 87 MMHG | SYSTOLIC BLOOD PRESSURE: 132 MMHG | RESPIRATION RATE: 15 BRPM | HEART RATE: 84 BPM

## 2020-12-09 DIAGNOSIS — Z00.00 ENCOUNTER FOR GENERAL ADULT MEDICAL EXAMINATION WITHOUT ABNORMAL FINDINGS: ICD-10-CM

## 2020-12-09 DIAGNOSIS — Z12.39 ENCOUNTER FOR OTHER SCREENING FOR MALIGNANT NEOPLASM OF BREAST: ICD-10-CM

## 2020-12-09 DIAGNOSIS — D35.2 BENIGN NEOPLASM OF PITUITARY GLAND: Chronic | ICD-10-CM

## 2020-12-09 DIAGNOSIS — R92.8 OTHER ABNORMAL AND INCONCLUSIVE FINDINGS ON DIAGNOSTIC IMAGING OF BREAST: ICD-10-CM

## 2020-12-09 LAB
HCV AB SER QL: NONREACTIVE
HCV S/CO RATIO: 0.21 S/CO
HIV1+2 AB SPEC QL IA.RAPID: NONREACTIVE

## 2020-12-09 PROCEDURE — G0463: CPT

## 2020-12-10 ENCOUNTER — TRANSCRIPTION ENCOUNTER (OUTPATIENT)
Age: 60
End: 2020-12-10

## 2020-12-10 ENCOUNTER — OUTPATIENT (OUTPATIENT)
Dept: OUTPATIENT SERVICES | Facility: HOSPITAL | Age: 60
LOS: 1 days | End: 2020-12-10
Payer: SELF-PAY

## 2020-12-10 ENCOUNTER — APPOINTMENT (OUTPATIENT)
Dept: NEUROLOGY | Facility: HOSPITAL | Age: 60
End: 2020-12-10

## 2020-12-10 VITALS
HEIGHT: 60 IN | HEART RATE: 88 BPM | WEIGHT: 115 LBS | DIASTOLIC BLOOD PRESSURE: 81 MMHG | BODY MASS INDEX: 22.58 KG/M2 | SYSTOLIC BLOOD PRESSURE: 121 MMHG | TEMPERATURE: 97.1 F

## 2020-12-10 DIAGNOSIS — R42 DIZZINESS AND GIDDINESS: ICD-10-CM

## 2020-12-10 DIAGNOSIS — C50.911 MALIGNANT NEOPLASM OF UNSPECIFIED SITE OF RIGHT FEMALE BREAST: ICD-10-CM

## 2020-12-10 DIAGNOSIS — R56.9 UNSPECIFIED CONVULSIONS: ICD-10-CM

## 2020-12-10 DIAGNOSIS — D35.2 BENIGN NEOPLASM OF PITUITARY GLAND: Chronic | ICD-10-CM

## 2020-12-10 DIAGNOSIS — G47.00 INSOMNIA, UNSPECIFIED: ICD-10-CM

## 2020-12-10 PROCEDURE — G0463: CPT

## 2020-12-10 RX ORDER — FAMOTIDINE 20 MG/1
20 TABLET, FILM COATED ORAL
Qty: 30 | Refills: 0 | Status: DISCONTINUED | COMMUNITY
Start: 2020-10-20 | End: 2020-12-10

## 2020-12-10 NOTE — HISTORY OF PRESENT ILLNESS
[FreeTextEntry1] : [id number]/ [id number] [FreeTextEntry2] : shona/ Deedee [TWNoteComboBox1] : Jordanian [de-identified] : 60 year old F with h/o Rathke cyst tumor s/p resection, osteopenia, tubular adenoma of colon presenting to clinic for discussion of breast biopsy results. Patient initially called to discuss Pathology report DCIS at right breast site. Patient apart of [facility] program- results discussed with Dr. Morfin and Ms. Tammy Tao, CSP . However after completing phone call notified by Dr. Morfin that she was just called and pathology updated to "invasive ductal carcinoma of right breast". Patient called for same day appointment at clinic. \par \par Updated results reviewed with Ms. Guevara and Dr. Morfin. Patient states understanding of results and need for followup with breast surgeon. Much emotional support provided to patient. \par

## 2020-12-10 NOTE — PHYSICAL EXAM
[Well Nourished] : well nourished [Well Developed] : well developed [Normal Voice/Communication] : normal voice/communication [EOMI] : extraocular movements intact [No Respiratory Distress] : no respiratory distress  [Grossly Normal Strength/Tone] : grossly normal strength/tone [No Rash] : no rash [Coordination Grossly Intact] : coordination grossly intact [No Focal Deficits] : no focal deficits [Normal Gait] : normal gait [Normal Insight/Judgement] : insight and judgment were intact [de-identified] : anxious appearing, tearful

## 2020-12-10 NOTE — HISTORY OF PRESENT ILLNESS
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 Pt returns for follow up of her headaches. Previously was on Amitriptyline but started having headaches again on the medication. Pt had also been tried on Indocin and Topamax.\par \par Previously reported that headaches are directly related to lack of sleep. States that she struggles to maintain sleep, with sleep episodes approx 45mins-3hrs through the night. HA not present on nights that she sleeps well. Previously [place] Zolpidem [place]. Not tried any other medications.\par \par Amitriptyline stopped on prior tele-visit and started on Trazodone 50mg qHS. States that she is tolerating the medication well, without side effects. She reports improved duration of sleep at night and decreased frequency/intensity of headaches. \par \par No other complaints. Denies fevers, chills, ns, cp, sob, abd pain, n/v/d/c, weakness, or changes to weight or senses. \par \par Of note, patient recently diagnosed with breast ca, with confirmed biopsy day prior to this evaluation.

## 2020-12-10 NOTE — ASSESSMENT
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 Patient recently switched from Amitriptyline to trazodone. Tolerating medication well. Improved sleep at night and decreased frequency/intensity of headaches. Of note, she was recently diagnosed with breast ca, confirmed with biopsy. She is currently in contact with counseling services. \par \par \par Plan:\par - Continue Trazodone 50mg qHS\par - Tylenol PRN\par - RTC in 6 months\par \par Case discussed with Dr. Noble

## 2020-12-10 NOTE — REVIEW OF SYSTEMS
[Negative] : Respiratory [Seizures] : no convulsions [Dizziness] : no dizziness [Lightheadedness] : no lightheadedness [Vertigo] : no vertigo [Difficulty Walking] : no difficulty walking [Ataxia] : no ataxia

## 2020-12-10 NOTE — END OF VISIT
[] : Resident [FreeTextEntry3] : I went personally in and went over pt's questions and results. Her questions were and answered and verbalized understanding\par emotional support provided

## 2020-12-10 NOTE — PHYSICAL EXAM
[General Appearance - Alert] : alert [General Appearance - In No Acute Distress] : in no acute distress [General Appearance - Well Nourished] : well nourished [General Appearance - Well Developed] : well developed [General Appearance - Well-Appearing] : healthy appearing [Oriented To Time, Place, And Person] : oriented to person, place, and time [Impaired Insight] : insight and judgment were intact [Affect] : the affect was normal [Mood] : the mood was normal [Memory Recent] : recent memory was not impaired [Memory Remote] : remote memory was not impaired [Cranial Nerves Optic (II)] : visual acuity intact bilaterally,  visual fields full to confrontation, pupils equal round and reactive to light [Cranial Nerves Oculomotor (III)] : extraocular motion intact [Cranial Nerves Trigeminal (V)] : facial sensation intact symmetrically [Cranial Nerves Facial (VII)] : face symmetrical [Cranial Nerves Vestibulocochlear (VIII)] : hearing was intact bilaterally [Cranial Nerves Glossopharyngeal (IX)] : tongue and palate midline [Cranial Nerves Accessory (XI - Cranial And Spinal)] : head turning and shoulder shrug symmetric [Cranial Nerves Hypoglossal (XII)] : there was no tongue deviation with protrusion [Motor Tone] : muscle tone was normal in all four extremities [Motor Strength] : muscle strength was normal in all four extremities [Involuntary Movements] : no involuntary movements were seen [No Muscle Atrophy] : normal bulk in all four extremities [Sensation Tactile Decrease] : light touch was intact [Proprioception] : proprioception was intact [Abnormal Walk] : normal gait [Balance] : balance was intact [2+] : Brachioradialis left 2+ [1+] : Patella left 1+ [0] : Ankle jerk left 0 [Sclera] : the sclera and conjunctiva were normal [PERRL With Normal Accommodation] : pupils were equal in size, round, reactive to light, with normal accommodation [Extraocular Movements] : extraocular movements were intact [Full Visual Field] : full visual field [Outer Ear] : the ears and nose were normal in appearance [Hearing Threshold Finger Rub Not [place]] : hearing was normal [Neck Appearance] : the appearance of the neck was normal [] : no respiratory distress [Exaggerated Use Of Accessory Muscles For Inspiration] : no accessory muscle use [Limited Balance] : balance was intact [Past-pointing] : there was no past-pointing [Tremor] : no tremor present [Dysdiadochokinesia Bilaterally] : not present [Coordination - Dysmetria Impaired Finger-to-Nose Bilateral] : not present [Coordination - Dysmetria Impaired Heel-to-Shin Bilateral] : not present [Plantar Reflex Right Only] : normal on the right [Plantar Reflex Left Only] : normal on the left [___] : absent on the right [___] : absent on the left

## 2020-12-16 ENCOUNTER — APPOINTMENT (OUTPATIENT)
Dept: SURGICAL ONCOLOGY | Facility: CLINIC | Age: 60
End: 2020-12-16
Payer: COMMERCIAL

## 2020-12-16 VITALS
OXYGEN SATURATION: 99 % | BODY MASS INDEX: 22.58 KG/M2 | DIASTOLIC BLOOD PRESSURE: 79 MMHG | WEIGHT: 115 LBS | SYSTOLIC BLOOD PRESSURE: 119 MMHG | HEIGHT: 60 IN | HEART RATE: 73 BPM | RESPIRATION RATE: 15 BRPM

## 2020-12-16 PROCEDURE — 99245 OFF/OP CONSLTJ NEW/EST HI 55: CPT

## 2020-12-16 NOTE — ADDENDUM
[FreeTextEntry1] : I, Racheal Marques, acted solely as a scribe for Dr. Sung Richardson on this date 12/16/2020.\par

## 2020-12-16 NOTE — ASSESSMENT
[FreeTextEntry1] : Right breast invasive ductal carcinoma\par I had a long discussion with the pt and her daughter regarding her diagnosis, prognosis and all management options. \par Recommend pre op MRI to rule out any multi focal and recommend right breast lumpectomy if MRI is negative\par Surgical procedure discussed in detail\par All questions answered \par \par

## 2020-12-16 NOTE — HISTORY OF PRESENT ILLNESS
[de-identified] : Pt is a 59 y/o female who present an initial consultation for right breast cancer ER+/N[place]+, HER-2 negative. \par She is status post right breast stereotactic biopsy on December 8, 2020 that initially showed intermediate grade DCIS but additional staining revealed invasive carcinoma.\par \par Mammogram (11/24/20): Suspicious calcifications right breast. Stereotactic biopsy advised. Recommendation of stereotactic biopsy. BI-RADS 4B. \par \par Pt reports no family history of breast cancer. \par Denies palpable breast masses, nipple discharge, nipple retraction/inversion, or skin changes.\par \par Today pt c/o of difficulty digesting certain foods associated with sharp stomach pain and vomiting - EGD, colonoscopy neg.  Pt. is on PPI bid.\par

## 2020-12-16 NOTE — PHYSICAL EXAM
[Normal] : supple, no neck mass and thyroid not enlarged [Normal Neck Lymph Nodes] : normal neck lymph nodes  [Normal Supraclavicular Lymph Nodes] : normal supraclavicular lymph nodes [Normal Groin Lymph Nodes] : normal groin lymph nodes [Normal Axillary Lymph Nodes] : normal axillary lymph nodes [Normal] : oriented to person, place and time, with appropriate affect [de-identified] : resolving ecchymosis right upper outer quadrant. no masses or adenopathy bilaterally.

## 2020-12-16 NOTE — OB HISTORY
[Menarche Age ____] : menarche age [unfilled] [Definite ___ (Date)] : the last menstrual period was [unfilled] [Total Preg ___] : G[unfilled] [Live Births ___] : P[unfilled]

## 2020-12-16 NOTE — CONSULT LETTER
[Consult Letter:] : I had the pleasure of evaluating your patient, [unfilled]. [Please see my note below.] : Please see my note below. [Sincerely,] : Sincerely, [Dear  ___] : Dear  [unfilled], [FreeTextEntry3] : Sung Richardson MD FACS\par

## 2020-12-17 ENCOUNTER — APPOINTMENT (OUTPATIENT)
Dept: MRI IMAGING | Facility: CLINIC | Age: 60
End: 2020-12-17
Payer: COMMERCIAL

## 2020-12-17 ENCOUNTER — RESULT REVIEW (OUTPATIENT)
Age: 60
End: 2020-12-17

## 2020-12-17 ENCOUNTER — OUTPATIENT (OUTPATIENT)
Dept: OUTPATIENT SERVICES | Facility: HOSPITAL | Age: 60
LOS: 1 days | End: 2020-12-17
Payer: COMMERCIAL

## 2020-12-17 ENCOUNTER — APPOINTMENT (OUTPATIENT)
Dept: HEMATOLOGY ONCOLOGY | Facility: CLINIC | Age: 60
End: 2020-12-17

## 2020-12-17 DIAGNOSIS — C50.911 MALIGNANT NEOPLASM OF UNSPECIFIED SITE OF RIGHT FEMALE BREAST: ICD-10-CM

## 2020-12-17 DIAGNOSIS — D35.2 BENIGN NEOPLASM OF PITUITARY GLAND: Chronic | ICD-10-CM

## 2020-12-17 PROCEDURE — C8937: CPT

## 2020-12-17 PROCEDURE — A9585: CPT

## 2020-12-17 PROCEDURE — 77049 MRI BREAST C-+ W/CAD BI: CPT | Mod: 26

## 2020-12-17 PROCEDURE — C8908: CPT

## 2020-12-22 ENCOUNTER — NON-APPOINTMENT (OUTPATIENT)
Age: 60
End: 2020-12-22

## 2020-12-23 ENCOUNTER — APPOINTMENT (OUTPATIENT)
Dept: CARDIOLOGY | Facility: HOSPITAL | Age: 60
End: 2020-12-23

## 2020-12-31 ENCOUNTER — RESULT REVIEW (OUTPATIENT)
Age: 60
End: 2020-12-31

## 2020-12-31 ENCOUNTER — OUTPATIENT (OUTPATIENT)
Dept: OUTPATIENT SERVICES | Facility: HOSPITAL | Age: 60
LOS: 1 days | End: 2020-12-31
Payer: COMMERCIAL

## 2020-12-31 ENCOUNTER — APPOINTMENT (OUTPATIENT)
Dept: MRI IMAGING | Facility: CLINIC | Age: 60
End: 2020-12-31
Payer: COMMERCIAL

## 2020-12-31 DIAGNOSIS — Z00.8 ENCOUNTER FOR OTHER GENERAL EXAMINATION: ICD-10-CM

## 2020-12-31 DIAGNOSIS — D35.2 BENIGN NEOPLASM OF PITUITARY GLAND: Chronic | ICD-10-CM

## 2020-12-31 PROCEDURE — 19085 BX BREAST 1ST LESION MR IMAG: CPT

## 2020-12-31 PROCEDURE — 77065 DX MAMMO INCL CAD UNI: CPT | Mod: 26,LT

## 2020-12-31 PROCEDURE — A4648: CPT

## 2020-12-31 PROCEDURE — 88305 TISSUE EXAM BY PATHOLOGIST: CPT | Mod: 26

## 2020-12-31 PROCEDURE — 19085 BX BREAST 1ST LESION MR IMAG: CPT | Mod: LT

## 2020-12-31 PROCEDURE — 77065 DX MAMMO INCL CAD UNI: CPT

## 2020-12-31 PROCEDURE — 88305 TISSUE EXAM BY PATHOLOGIST: CPT

## 2020-12-31 PROCEDURE — A9585: CPT

## 2021-01-05 ENCOUNTER — NON-APPOINTMENT (OUTPATIENT)
Age: 61
End: 2021-01-05

## 2021-01-07 ENCOUNTER — NON-APPOINTMENT (OUTPATIENT)
Age: 61
End: 2021-01-07

## 2021-01-11 ENCOUNTER — APPOINTMENT (OUTPATIENT)
Dept: FAMILY MEDICINE | Facility: HOSPITAL | Age: 61
End: 2021-01-11

## 2021-01-11 ENCOUNTER — OUTPATIENT (OUTPATIENT)
Dept: OUTPATIENT SERVICES | Facility: HOSPITAL | Age: 61
LOS: 1 days | End: 2021-01-11
Payer: SELF-PAY

## 2021-01-11 VITALS
DIASTOLIC BLOOD PRESSURE: 63 MMHG | HEIGHT: 60 IN | WEIGHT: 114.2 LBS | OXYGEN SATURATION: 98 % | TEMPERATURE: 98 F | SYSTOLIC BLOOD PRESSURE: 92 MMHG | HEART RATE: 81 BPM | RESPIRATION RATE: 16 BRPM

## 2021-01-11 VITALS
TEMPERATURE: 97.6 F | OXYGEN SATURATION: 100 % | BODY MASS INDEX: 22.58 KG/M2 | DIASTOLIC BLOOD PRESSURE: 77 MMHG | WEIGHT: 115 LBS | RESPIRATION RATE: 15 BRPM | HEIGHT: 60 IN | SYSTOLIC BLOOD PRESSURE: 109 MMHG | HEART RATE: 89 BPM

## 2021-01-11 DIAGNOSIS — D35.2 BENIGN NEOPLASM OF PITUITARY GLAND: Chronic | ICD-10-CM

## 2021-01-11 DIAGNOSIS — C50.911 MALIGNANT NEOPLASM OF UNSPECIFIED SITE OF RIGHT FEMALE BREAST: ICD-10-CM

## 2021-01-11 DIAGNOSIS — Z00.00 ENCOUNTER FOR GENERAL ADULT MEDICAL EXAMINATION WITHOUT ABNORMAL FINDINGS: ICD-10-CM

## 2021-01-11 DIAGNOSIS — Z01.818 ENCOUNTER FOR OTHER PREPROCEDURAL EXAMINATION: ICD-10-CM

## 2021-01-11 DIAGNOSIS — E03.9 HYPOTHYROIDISM, UNSPECIFIED: ICD-10-CM

## 2021-01-11 DIAGNOSIS — G47.00 INSOMNIA, UNSPECIFIED: ICD-10-CM

## 2021-01-11 PROBLEM — R10.11 RIGHT UPPER QUADRANT PAIN: Chronic | Status: ACTIVE | Noted: 2021-01-08

## 2021-01-11 LAB
ANION GAP SERPL CALC-SCNC: 8 MMOL/L — SIGNIFICANT CHANGE UP (ref 5–17)
BUN SERPL-MCNC: 20 MG/DL — SIGNIFICANT CHANGE UP (ref 7–23)
CALCIUM SERPL-MCNC: 9.5 MG/DL — SIGNIFICANT CHANGE UP (ref 8.4–10.5)
CHLORIDE SERPL-SCNC: 103 MMOL/L — SIGNIFICANT CHANGE UP (ref 96–108)
CO2 SERPL-SCNC: 32 MMOL/L — HIGH (ref 22–31)
CREAT SERPL-MCNC: 0.71 MG/DL — SIGNIFICANT CHANGE UP (ref 0.5–1.3)
GLUCOSE SERPL-MCNC: 82 MG/DL — SIGNIFICANT CHANGE UP (ref 70–99)
HCT VFR BLD CALC: 43.1 % — SIGNIFICANT CHANGE UP (ref 34.5–45)
HGB BLD-MCNC: 14.6 G/DL — SIGNIFICANT CHANGE UP (ref 11.5–15.5)
MCHC RBC-ENTMCNC: 30.3 PG — SIGNIFICANT CHANGE UP (ref 27–34)
MCHC RBC-ENTMCNC: 33.9 GM/DL — SIGNIFICANT CHANGE UP (ref 32–36)
MCV RBC AUTO: 89.4 FL — SIGNIFICANT CHANGE UP (ref 80–100)
NRBC # BLD: 0 /100 WBCS — SIGNIFICANT CHANGE UP (ref 0–0)
PLATELET # BLD AUTO: 282 K/UL — SIGNIFICANT CHANGE UP (ref 150–400)
POTASSIUM SERPL-MCNC: 4.1 MMOL/L — SIGNIFICANT CHANGE UP (ref 3.5–5.3)
POTASSIUM SERPL-SCNC: 4.1 MMOL/L — SIGNIFICANT CHANGE UP (ref 3.5–5.3)
RBC # BLD: 4.82 M/UL — SIGNIFICANT CHANGE UP (ref 3.8–5.2)
RBC # FLD: 12.3 % — SIGNIFICANT CHANGE UP (ref 10.3–14.5)
SODIUM SERPL-SCNC: 143 MMOL/L — SIGNIFICANT CHANGE UP (ref 135–145)
WBC # BLD: 4.13 K/UL — SIGNIFICANT CHANGE UP (ref 3.8–10.5)
WBC # FLD AUTO: 4.13 K/UL — SIGNIFICANT CHANGE UP (ref 3.8–10.5)

## 2021-01-11 PROCEDURE — 93010 ELECTROCARDIOGRAM REPORT: CPT | Mod: NC

## 2021-01-11 PROCEDURE — G0463: CPT

## 2021-01-11 NOTE — H&P PST ADULT - NSICDXPROBLEM_GEN_ALL_CORE_FT
PROBLEM DIAGNOSES  Problem: Malignant neoplasm of unspecified site of right female breast  Assessment and Plan: Pre-op instructions given. Pt verbalized understanding  Chlorhexidine wash instructions given  Pending: M/C for abnormal ekg + Covidpcr results     Problem: Insomnia  Assessment and Plan: Pt instructed to take meds    Problem: Hypothyroid  Assessment and Plan: no med use

## 2021-01-11 NOTE — H&P PST ADULT - NSICDXPASTMEDICALHX_GEN_ALL_CORE_FT
PAST MEDICAL HISTORY:  Breast cancer right dx 2020    Diverticulitis     Hypothyroid no meds    Right upper quadrant abdominal pain

## 2021-01-11 NOTE — PHYSICAL EXAM
[No Acute Distress] : no acute distress [Well-Appearing] : well-appearing [No Respiratory Distress] : no respiratory distress  [No Accessory Muscle Use] : no accessory muscle use [Clear to Auscultation] : lungs were clear to auscultation bilaterally [Normal Rate] : normal rate  [Regular Rhythm] : with a regular rhythm [Normal S1, S2] : normal S1 and S2 [No Murmur] : no murmur heard [No Carotid Bruits] : no carotid bruits [No Varicosities] : no varicosities [Pedal Pulses Present] : the pedal pulses are present [No Edema] : there was no peripheral edema [Soft] : abdomen soft [Non Tender] : non-tender [Non-distended] : non-distended [No Masses] : no abdominal mass palpated [No HSM] : no HSM [Normal Bowel Sounds] : normal bowel sounds [Normal Posterior Cervical Nodes] : no posterior cervical lymphadenopathy [Normal Anterior Cervical Nodes] : no anterior cervical lymphadenopathy [Coordination Grossly Intact] : coordination grossly intact [Normal Gait] : normal gait [Normal Affect] : the affect was normal [Normal Insight/Judgement] : insight and judgment were intact

## 2021-01-11 NOTE — H&P PST ADULT - NS PRO FEM REPRO BREAST EXAM FREQ
GYN:  Patient here for planned IUD insertion.  See procedure note for detail.    Germania Malik MD  [facility], Women's Health     monthly

## 2021-01-11 NOTE — H&P PST ADULT - HISTORY OF PRESENT ILLNESS
59yo Kazakh speaking female ( phone used) with medical h/o Insomnia and Right breast cancer and presents today for PST for Right Breast [place]  Lumpectomy scheduled for 1/19/2021

## 2021-01-11 NOTE — H&P PST ADULT - NSANTHOSAYNRD_GEN_A_CORE
neck 13.5inches/No. BRIE screening performed.  STOP BANG Legend: 0-2 = LOW Risk; 3-4 = INTERMEDIATE Risk; 5-8 = HIGH Risk

## 2021-01-11 NOTE — H&P PST ADULT - NEGATIVE PSYCHIATRIC SYMPTOMS
no suicidal ideation/no depression/no anxiety/no memory loss/no paranoia/no mood swings/no agitation

## 2021-01-12 ENCOUNTER — APPOINTMENT (OUTPATIENT)
Dept: FAMILY MEDICINE | Facility: HOSPITAL | Age: 61
End: 2021-01-12

## 2021-01-12 ENCOUNTER — RESULT CHARGE (OUTPATIENT)
Age: 61
End: 2021-01-12

## 2021-01-12 ENCOUNTER — OUTPATIENT (OUTPATIENT)
Dept: OUTPATIENT SERVICES | Facility: HOSPITAL | Age: 61
LOS: 1 days | End: 2021-01-12
Payer: SELF-PAY

## 2021-01-12 VITALS
HEART RATE: 87 BPM | OXYGEN SATURATION: 98 % | SYSTOLIC BLOOD PRESSURE: 120 MMHG | WEIGHT: 114 LBS | RESPIRATION RATE: 15 BRPM | BODY MASS INDEX: 22.26 KG/M2 | TEMPERATURE: 97 F | DIASTOLIC BLOOD PRESSURE: 80 MMHG

## 2021-01-12 DIAGNOSIS — D35.2 BENIGN NEOPLASM OF PITUITARY GLAND: Chronic | ICD-10-CM

## 2021-01-12 DIAGNOSIS — Z00.00 ENCOUNTER FOR GENERAL ADULT MEDICAL EXAMINATION WITHOUT ABNORMAL FINDINGS: ICD-10-CM

## 2021-01-12 PROBLEM — C50.919 MALIGNANT NEOPLASM OF UNSPECIFIED SITE OF UNSPECIFIED FEMALE BREAST: Chronic | Status: ACTIVE | Noted: 2021-01-11

## 2021-01-12 PROBLEM — E03.9 HYPOTHYROIDISM, UNSPECIFIED: Chronic | Status: ACTIVE | Noted: 2018-12-06

## 2021-01-12 PROCEDURE — G0463: CPT

## 2021-01-13 ENCOUNTER — RESULT REVIEW (OUTPATIENT)
Age: 61
End: 2021-01-13

## 2021-01-13 PROCEDURE — 36415 COLL VENOUS BLD VENIPUNCTURE: CPT

## 2021-01-13 PROCEDURE — 87086 URINE CULTURE/COLONY COUNT: CPT

## 2021-01-13 PROCEDURE — 80048 BASIC METABOLIC PNL TOTAL CA: CPT

## 2021-01-13 PROCEDURE — 93005 ELECTROCARDIOGRAM TRACING: CPT

## 2021-01-13 PROCEDURE — 85027 COMPLETE CBC AUTOMATED: CPT

## 2021-01-13 PROCEDURE — G0463: CPT

## 2021-01-14 ENCOUNTER — RESULT REVIEW (OUTPATIENT)
Age: 61
End: 2021-01-14

## 2021-01-14 ENCOUNTER — APPOINTMENT (OUTPATIENT)
Dept: MAMMOGRAPHY | Facility: IMAGING CENTER | Age: 61
End: 2021-01-14
Payer: COMMERCIAL

## 2021-01-14 ENCOUNTER — OUTPATIENT (OUTPATIENT)
Dept: OUTPATIENT SERVICES | Facility: HOSPITAL | Age: 61
LOS: 1 days | End: 2021-01-14
Payer: COMMERCIAL

## 2021-01-14 DIAGNOSIS — D35.2 BENIGN NEOPLASM OF PITUITARY GLAND: Chronic | ICD-10-CM

## 2021-01-14 DIAGNOSIS — Z00.8 ENCOUNTER FOR OTHER GENERAL EXAMINATION: ICD-10-CM

## 2021-01-14 DIAGNOSIS — C50.911 MALIGNANT NEOPLASM OF UNSPECIFIED SITE OF RIGHT FEMALE BREAST: ICD-10-CM

## 2021-01-14 DIAGNOSIS — Z01.818 ENCOUNTER FOR OTHER PREPROCEDURAL EXAMINATION: ICD-10-CM

## 2021-01-14 DIAGNOSIS — R30.0 DYSURIA: ICD-10-CM

## 2021-01-14 PROCEDURE — 19281 PERQ DEVICE BREAST 1ST IMAG: CPT | Mod: LT

## 2021-01-14 PROCEDURE — C1739: CPT

## 2021-01-14 PROCEDURE — 19281 PERQ DEVICE BREAST 1ST IMAG: CPT

## 2021-01-15 LAB
BACTERIA UR CULT: NORMAL
BILIRUB UR QL STRIP: NEGATIVE
CLARITY UR: CLEAR
COLLECTION METHOD: NORMAL
GLUCOSE UR-MCNC: NEGATIVE
HCG UR QL: 0.2 EU/DL
HGB UR QL STRIP.AUTO: NORMAL
KETONES UR-MCNC: NEGATIVE
LEUKOCYTE ESTERASE UR QL STRIP: NEGATIVE
NITRITE UR QL STRIP: NEGATIVE
PH UR STRIP: 7
PROT UR STRIP-MCNC: NEGATIVE
SP GR UR STRIP: 1.02
SURGICAL PATHOLOGY STUDY: SIGNIFICANT CHANGE UP

## 2021-01-15 NOTE — PLAN
[FreeTextEntry1] : #Dysuria\par -Will empirically treat for UTI with Macrobid 100mg BID x 5 days\par -Urine dip with trace blood\par -Will send out urine culture, change abx treatment PRN\par -Pt encouraged to drink plenty of fluids and monitor for signs of fever, back pain, hematuria\par -RTC later this week for pre-surgical COVID swab if needed and re-evaluation of symptoms, will also get UA micro\par

## 2021-01-15 NOTE — REVIEW OF SYSTEMS
[Fever] : no fever [Chills] : no chills [Fatigue] : no fatigue [Chest Pain] : no chest pain [Palpitations] : no palpitations [Shortness Of Breath] : no shortness of breath [Wheezing] : no wheezing [Cough] : no cough [Abdominal Pain] : no abdominal pain [Nausea] : no nausea [Constipation] : no constipation [Diarrhea] : diarrhea [Vomiting] : no vomiting [Heartburn] : no heartburn [Dysuria] : dysuria [Incontinence] : no incontinence [Nocturia] : no nocturia [Hematuria] : no hematuria [Frequency] : frequency [Vaginal Discharge] : no vaginal discharge

## 2021-01-15 NOTE — HISTORY OF PRESENT ILLNESS
[FreeTextEntry8] : 59 y/o F PMHx DCIS of R breast, h/o Rathke cyst tumor s/p resection, cholestasis, osteopenia, tubular adenoma of colon presenting with 1 day h/o dysuria. Pt notes burning on urination, increased frequency, and sensation of incomplete emptying. Denies fever, chills, HA, cough, SOB, CP, n/v/d, abd pain, hematuria, vaginal discharge or itch. Pt not taking anything at this time. No h/x of UTI or vaginal infection. Pt is not currently sexually active.

## 2021-01-15 NOTE — ASSESSMENT
[FreeTextEntry1] : 59 y/o F PMHx DCIS of R breast, h/o Rathke cyst tumor s/p resection, cholestasis, osteopenia, tubular adenoma of colon presenting with 1 day h/o dysuria

## 2021-01-15 NOTE — PHYSICAL EXAM
[No Acute Distress] : no acute distress [Well-Appearing] : well-appearing [No Respiratory Distress] : no respiratory distress  [No Accessory Muscle Use] : no accessory muscle use [Clear to Auscultation] : lungs were clear to auscultation bilaterally [Normal Rate] : normal rate  [Regular Rhythm] : with a regular rhythm [Normal S1, S2] : normal S1 and S2 [No Murmur] : no murmur heard [Soft] : abdomen soft [Non Tender] : non-tender [Non-distended] : non-distended [No Masses] : no abdominal mass palpated [No HSM] : no HSM [Normal Bowel Sounds] : normal bowel sounds [No CVA Tenderness] : no CVA  tenderness [No Spinal Tenderness] : no spinal tenderness [Normal Affect] : the affect was normal [Normal Insight/Judgement] : insight and judgment were intact [de-identified] : +suprapubic TTP, Pelvic exam deferred

## 2021-01-16 ENCOUNTER — APPOINTMENT (OUTPATIENT)
Dept: DISASTER EMERGENCY | Facility: CLINIC | Age: 61
End: 2021-01-16

## 2021-01-16 ENCOUNTER — APPOINTMENT (OUTPATIENT)
Dept: FAMILY MEDICINE | Facility: HOSPITAL | Age: 61
End: 2021-01-16

## 2021-01-16 ENCOUNTER — OUTPATIENT (OUTPATIENT)
Dept: OUTPATIENT SERVICES | Facility: HOSPITAL | Age: 61
LOS: 1 days | End: 2021-01-16
Payer: SELF-PAY

## 2021-01-16 VITALS
OXYGEN SATURATION: 98 % | WEIGHT: 115 LBS | SYSTOLIC BLOOD PRESSURE: 105 MMHG | HEART RATE: 91 BPM | DIASTOLIC BLOOD PRESSURE: 71 MMHG | BODY MASS INDEX: 22.46 KG/M2 | RESPIRATION RATE: 15 BRPM | TEMPERATURE: 97 F

## 2021-01-16 DIAGNOSIS — Z00.00 ENCOUNTER FOR GENERAL ADULT MEDICAL EXAMINATION WITHOUT ABNORMAL FINDINGS: ICD-10-CM

## 2021-01-16 DIAGNOSIS — D35.2 BENIGN NEOPLASM OF PITUITARY GLAND: Chronic | ICD-10-CM

## 2021-01-16 DIAGNOSIS — Z87.898 PERSONAL HISTORY OF OTHER SPECIFIED CONDITIONS: ICD-10-CM

## 2021-01-16 PROCEDURE — U0005: CPT

## 2021-01-16 PROCEDURE — 81001 URINALYSIS AUTO W/SCOPE: CPT

## 2021-01-16 PROCEDURE — G0463: CPT

## 2021-01-16 PROCEDURE — U0003: CPT

## 2021-01-16 RX ORDER — NITROFURANTOIN MACROCRYSTALS 100 MG/1
100 CAPSULE ORAL
Qty: 10 | Refills: 0 | Status: COMPLETED | COMMUNITY
Start: 2021-01-12 | End: 2021-01-16

## 2021-01-18 ENCOUNTER — TRANSCRIPTION ENCOUNTER (OUTPATIENT)
Age: 61
End: 2021-01-18

## 2021-01-19 ENCOUNTER — OUTPATIENT (OUTPATIENT)
Dept: OUTPATIENT SERVICES | Facility: HOSPITAL | Age: 61
LOS: 1 days | End: 2021-01-19
Payer: SELF-PAY

## 2021-01-19 ENCOUNTER — APPOINTMENT (OUTPATIENT)
Dept: SURGICAL ONCOLOGY | Facility: HOSPITAL | Age: 61
End: 2021-01-19

## 2021-01-19 ENCOUNTER — RESULT REVIEW (OUTPATIENT)
Age: 61
End: 2021-01-19

## 2021-01-19 VITALS
HEART RATE: 79 BPM | WEIGHT: 114.2 LBS | OXYGEN SATURATION: 100 % | SYSTOLIC BLOOD PRESSURE: 109 MMHG | RESPIRATION RATE: 16 BRPM | TEMPERATURE: 97 F | HEIGHT: 60 IN | DIASTOLIC BLOOD PRESSURE: 72 MMHG

## 2021-01-19 VITALS
OXYGEN SATURATION: 97 % | DIASTOLIC BLOOD PRESSURE: 72 MMHG | TEMPERATURE: 97 F | SYSTOLIC BLOOD PRESSURE: 126 MMHG | RESPIRATION RATE: 17 BRPM | HEART RATE: 86 BPM

## 2021-01-19 DIAGNOSIS — C50.911 MALIGNANT NEOPLASM OF UNSPECIFIED SITE OF RIGHT FEMALE BREAST: ICD-10-CM

## 2021-01-19 DIAGNOSIS — R30.0 DYSURIA: ICD-10-CM

## 2021-01-19 DIAGNOSIS — D35.2 BENIGN NEOPLASM OF PITUITARY GLAND: Chronic | ICD-10-CM

## 2021-01-19 DIAGNOSIS — Z01.818 ENCOUNTER FOR OTHER PREPROCEDURAL EXAMINATION: ICD-10-CM

## 2021-01-19 PROBLEM — Z87.898 HISTORY OF DYSURIA: Status: RESOLVED | Noted: 2021-01-12 | Resolved: 2021-01-19

## 2021-01-19 PROCEDURE — 76098 X-RAY EXAM SURGICAL SPECIMEN: CPT

## 2021-01-19 PROCEDURE — 14001 TIS TRNFR TRUNK 10.1-30SQCM: CPT

## 2021-01-19 PROCEDURE — 88307 TISSUE EXAM BY PATHOLOGIST: CPT

## 2021-01-19 PROCEDURE — 19301 PARTIAL MASTECTOMY: CPT | Mod: 50

## 2021-01-19 PROCEDURE — 88305 TISSUE EXAM BY PATHOLOGIST: CPT

## 2021-01-19 PROCEDURE — 88305 TISSUE EXAM BY PATHOLOGIST: CPT | Mod: 26

## 2021-01-19 PROCEDURE — 19301 PARTIAL MASTECTOMY: CPT

## 2021-01-19 PROCEDURE — 76098 X-RAY EXAM SURGICAL SPECIMEN: CPT | Mod: 26

## 2021-01-19 PROCEDURE — 38792 RA TRACER ID OF SENTINL NODE: CPT

## 2021-01-19 PROCEDURE — 38308 INCISION OF LYMPH CHANNELS: CPT | Mod: AS

## 2021-01-19 PROCEDURE — 38308 INCISION OF LYMPH CHANNELS: CPT

## 2021-01-19 PROCEDURE — 88307 TISSUE EXAM BY PATHOLOGIST: CPT | Mod: 26

## 2021-01-19 PROCEDURE — 38500 BIOPSY/REMOVAL LYMPH NODES: CPT | Mod: RT

## 2021-01-19 RX ORDER — SIMVASTATIN 20 MG/1
1 TABLET, FILM COATED ORAL
Qty: 0 | Refills: 0 | DISCHARGE

## 2021-01-19 RX ORDER — SODIUM CHLORIDE 9 MG/ML
1000 INJECTION, SOLUTION INTRAVENOUS
Refills: 0 | Status: DISCONTINUED | OUTPATIENT
Start: 2021-01-19 | End: 2021-01-19

## 2021-01-19 RX ORDER — HYDROMORPHONE HYDROCHLORIDE 2 MG/ML
0.5 INJECTION INTRAMUSCULAR; INTRAVENOUS; SUBCUTANEOUS
Refills: 0 | Status: DISCONTINUED | OUTPATIENT
Start: 2021-01-19 | End: 2021-01-19

## 2021-01-19 RX ORDER — ACETAMINOPHEN 500 MG
650 TABLET ORAL EVERY 6 HOURS
Refills: 0 | Status: DISCONTINUED | OUTPATIENT
Start: 2021-01-19 | End: 2021-02-03

## 2021-01-19 RX ORDER — ONDANSETRON 8 MG/1
4 TABLET, FILM COATED ORAL ONCE
Refills: 0 | Status: COMPLETED | OUTPATIENT
Start: 2021-01-19 | End: 2021-01-19

## 2021-01-19 RX ADMIN — ONDANSETRON 4 MILLIGRAM(S): 8 TABLET, FILM COATED ORAL at 19:54

## 2021-01-19 RX ADMIN — SODIUM CHLORIDE 50 MILLILITER(S): 9 INJECTION, SOLUTION INTRAVENOUS at 14:30

## 2021-01-19 NOTE — ASSESSMENT
[FreeTextEntry4] : 61 y/o F PMHx DCIS of R breast, h/o Rathke cyst tumor s/p resection, cholestasis, osteopenia, tubular adenoma of colon presenting for medical clearance for scheduled bilateral breast lumpectomy on 1/19/21

## 2021-01-19 NOTE — ASU PATIENT PROFILE, ADULT - PMH
Breast cancer  right dx 2020  Diverticulitis    Hypothyroid  no meds  Right upper quadrant abdominal pain

## 2021-01-19 NOTE — BRIEF OPERATIVE NOTE - NSICDXBRIEFPROCEDURE_GEN_ALL_CORE_FT
PROCEDURES:  Left breast lumpectomy 19-Jan-2021 19:33:24  David Gray  Biopsy of right axillary sentinel nodes 19-Jan-2021 19:33:00  David Gray  Right breast lumpectomy 19-Jan-2021 19:32:00  David Gray

## 2021-01-19 NOTE — HISTORY OF PRESENT ILLNESS
[No Pertinent Cardiac History] : no history of aortic stenosis, atrial fibrillation, coronary artery disease, recent myocardial infarction, or implantable device/pacemaker [No Pertinent Pulmonary History] : no history of asthma, COPD, sleep apnea, or smoking [No Adverse Anesthesia Reaction] : no adverse anesthesia reaction in self or family member [(Patient denies any chest pain, claudication, dyspnea on exertion, orthopnea, palpitations or syncope)] : Patient denies any chest pain, claudication, dyspnea on exertion, orthopnea, palpitations or syncope [Chronic Anticoagulation] : no chronic anticoagulation [Chronic Kidney Disease] : no chronic kidney disease [Diabetes] : no diabetes [FreeTextEntry1] : B/l breast lumpectomy [FreeTextEntry2] : 1/19/2021 [FreeTextEntry3] : Dr. Richardson [FreeTextEntry4] : 59 y/o F PMHx DCIS of R breast, h/o Rathke cyst tumor s/p resection, cholestasis, osteopenia, tubular adenoma of colon presenting for medical clearance for scheduled bilateral breast lumpectomy on 1/19/21. Denies any cardiac or pulmonary history. No previous adverse reactions to anesthesia. Allergies to oxycodone and morphine, gets rash and nausea. \par EKG performed on 1/11/21 showing NSR no acute changes. Pre-surgical BMP and CBC performed and reviewed. [FreeTextEntry7] : EKG 1/11/21: No acute changes [FreeTextEntry8] : >4METS

## 2021-01-19 NOTE — PHYSICAL EXAM
[Well-Appearing] : well-appearing [No Acute Distress] : no acute distress [No Respiratory Distress] : no respiratory distress  [Clear to Auscultation] : lungs were clear to auscultation bilaterally [Normal Rate] : normal rate  [Regular Rhythm] : with a regular rhythm [Normal S1, S2] : normal S1 and S2 [Soft] : abdomen soft [Non Tender] : non-tender [Non-distended] : non-distended [No HSM] : no HSM [Normal Bowel Sounds] : normal bowel sounds [No CVA Tenderness] : no CVA  tenderness [No Spinal Tenderness] : no spinal tenderness [Normal Affect] : the affect was normal [Normal Insight/Judgement] : insight and judgment were intact

## 2021-01-19 NOTE — REVIEW OF SYSTEMS
[Fever] : no fever [Chills] : no chills [Chest Pain] : no chest pain [Palpitations] : no palpitations [Wheezing] : no wheezing [Shortness Of Breath] : no shortness of breath [Cough] : no cough [Abdominal Pain] : no abdominal pain [Nausea] : no nausea [Constipation] : no constipation [Diarrhea] : diarrhea [Vomiting] : no vomiting [Melena] : no melena [Heartburn] : no heartburn [Dysuria] : no dysuria [Hematuria] : no hematuria [Headache] : no headache [Dizziness] : no dizziness [Anxiety] : no anxiety [Depression] : no depression

## 2021-01-19 NOTE — ASU DISCHARGE PLAN (ADULT/PEDIATRIC) - CARE PROVIDER_API CALL
Snug Richardson)  Surgery  [address] [place] [address]  Phone: [phone]  Fax: [phone]  Follow Up Time:

## 2021-01-19 NOTE — REVIEW OF SYSTEMS
[Fever] : no fever [Chills] : no chills [Chest Pain] : no chest pain [Palpitations] : no palpitations [Shortness Of Breath] : no shortness of breath [Wheezing] : no wheezing [Abdominal Pain] : no abdominal pain [Nausea] : no nausea [Vomiting] : no vomiting [Dysuria] : no dysuria [Hematuria] : no hematuria [Headache] : no headache [Dizziness] : no dizziness

## 2021-01-19 NOTE — ASSESSMENT
[FreeTextEntry1] : 59 y/o F PMHx DCIS of R breast, h/o Rathke cyst tumor s/p resection, cholestasis, osteopenia, tubular adenoma of colon presenting for pre-op COVID swab

## 2021-01-19 NOTE — HISTORY OF PRESENT ILLNESS
[FreeTextEntry1] : pre-op COVID swab  [de-identified] : 59 y/o F PMHx DCIS of R breast, h/o Rathke cyst tumor s/p resection, cholestasis, osteopenia, tubular adenoma of colon presenting for pre-op COVID swab. Planned for b/l lumpectomy on 1/19/21. Pt's dysuria resolved, Urine Cx negative. Denies fever, chills, HA, cough, SOB, CP, n/v/d, abd pain, hematuria, flank pain. Pre-op evaluation completed earlier this week.

## 2021-01-19 NOTE — PLAN
[FreeTextEntry1] : #Pre-op COVID testing \par -COVID19 PCR performed today \par \par #Dysuria\par -Resolved\par -Urine Cx showing no growth\par -Pt to stop macrobid empiric therapy\par -RTC to f/u symptoms if they continue\par \par RTC 1 wk after lumpectomy for post-op visit and re-eval dysuria if they return

## 2021-01-19 NOTE — ADDENDUM
[FreeTextEntry1] : EKG performed on 1/11/21 showing NSR no acute changes. Pre-surgical BMP and CBC performed and reviewed wnl; results can be found in HIE\par Patient is medically optimized for b/l lumpectomy on 1/19/21

## 2021-01-19 NOTE — END OF VISIT
[] : Resident [FreeTextEntry3] : Patient has liver cholestasis which may interfere with anesthesia medication clearance from body. pt to rtc for COVID and clearane on Friday or Saturday.

## 2021-01-19 NOTE — PLAN
[FreeTextEntry1] : #Pre-op medical evaluation\par -Bilateral breast lumpectomy scheduled on 1/19/21\par -EKG w/o acute changes\par -Pre-surgical BMP and CBC performed prior to appt wnl, can be found in HIE\par -RCRI Class I Risk\par -Pt medically optimized for procedure\par

## 2021-01-20 ENCOUNTER — NON-APPOINTMENT (OUTPATIENT)
Age: 61
End: 2021-01-20

## 2021-01-24 LAB — SURGICAL PATHOLOGY STUDY: SIGNIFICANT CHANGE UP

## 2021-01-27 ENCOUNTER — APPOINTMENT (OUTPATIENT)
Dept: SURGICAL ONCOLOGY | Facility: CLINIC | Age: 61
End: 2021-01-27
Payer: COMMERCIAL

## 2021-01-27 VITALS
HEIGHT: 60 IN | WEIGHT: 115 LBS | OXYGEN SATURATION: 99 % | RESPIRATION RATE: 15 BRPM | DIASTOLIC BLOOD PRESSURE: 80 MMHG | SYSTOLIC BLOOD PRESSURE: 114 MMHG | BODY MASS INDEX: 22.58 KG/M2 | HEART RATE: 91 BPM

## 2021-01-27 PROCEDURE — 99024 POSTOP FOLLOW-UP VISIT: CPT

## 2021-01-28 ENCOUNTER — NON-APPOINTMENT (OUTPATIENT)
Age: 61
End: 2021-01-28

## 2021-02-03 ENCOUNTER — APPOINTMENT (OUTPATIENT)
Dept: FAMILY MEDICINE | Facility: HOSPITAL | Age: 61
End: 2021-02-03

## 2021-02-05 ENCOUNTER — APPOINTMENT (OUTPATIENT)
Dept: SURGICAL ONCOLOGY | Facility: CLINIC | Age: 61
End: 2021-02-05
Payer: COMMERCIAL

## 2021-02-05 VITALS
TEMPERATURE: 98.1 F | HEIGHT: 60 IN | HEART RATE: 85 BPM | DIASTOLIC BLOOD PRESSURE: 71 MMHG | SYSTOLIC BLOOD PRESSURE: 105 MMHG | WEIGHT: 115 LBS | BODY MASS INDEX: 22.58 KG/M2 | OXYGEN SATURATION: 98 % | RESPIRATION RATE: 14 BRPM

## 2021-02-05 PROCEDURE — 10140 I&D HMTMA SEROMA/FLUID COLLJ: CPT | Mod: 78

## 2021-02-05 PROCEDURE — 99024 POSTOP FOLLOW-UP VISIT: CPT

## 2021-02-07 LAB
APPEARANCE: ABNORMAL
BACTERIA: NEGATIVE
BILIRUBIN URINE: NEGATIVE
BLOOD URINE: NEGATIVE
COLOR: YELLOW
GLUCOSE QUALITATIVE U: NEGATIVE
HYALINE CASTS: 1 /LPF
KETONES URINE: NEGATIVE
LEUKOCYTE ESTERASE URINE: NEGATIVE
MICROSCOPIC-UA: NORMAL
NITRITE URINE: NEGATIVE
PH URINE: 5.5
PROTEIN URINE: NORMAL
RED BLOOD CELLS URINE: 2 /HPF
SARS-COV-2 N GENE NPH QL NAA+PROBE: NOT DETECTED
SPECIFIC GRAVITY URINE: 1.03
SQUAMOUS EPITHELIAL CELLS: 2 /HPF
URINE COMMENTS: NORMAL
UROBILINOGEN URINE: NORMAL
WHITE BLOOD CELLS URINE: 1 /HPF

## 2021-02-10 ENCOUNTER — OUTPATIENT (OUTPATIENT)
Dept: OUTPATIENT SERVICES | Facility: HOSPITAL | Age: 61
LOS: 1 days | Discharge: HOME | End: 2021-02-10
Payer: MEDICAID

## 2021-02-10 ENCOUNTER — APPOINTMENT (OUTPATIENT)
Dept: RADIATION ONCOLOGY | Facility: CLINIC | Age: 61
End: 2021-02-10
Payer: MEDICAID

## 2021-02-10 ENCOUNTER — OUTPATIENT (OUTPATIENT)
Dept: OUTPATIENT SERVICES | Facility: HOSPITAL | Age: 61
LOS: 1 days | Discharge: HOME | End: 2021-02-10

## 2021-02-10 VITALS
RESPIRATION RATE: 18 BRPM | SYSTOLIC BLOOD PRESSURE: 109 MMHG | HEART RATE: 80 BPM | BODY MASS INDEX: 22.39 KG/M2 | OXYGEN SATURATION: 99 % | TEMPERATURE: 97 F | DIASTOLIC BLOOD PRESSURE: 75 MMHG | WEIGHT: 114.64 LBS

## 2021-02-10 DIAGNOSIS — C50.919 MALIGNANT NEOPLASM OF UNSPECIFIED SITE OF UNSPECIFIED FEMALE BREAST: ICD-10-CM

## 2021-02-10 DIAGNOSIS — D35.2 BENIGN NEOPLASM OF PITUITARY GLAND: Chronic | ICD-10-CM

## 2021-02-10 PROCEDURE — 99205 OFFICE O/P NEW HI 60 MIN: CPT | Mod: 25

## 2021-02-10 PROCEDURE — 77263 THER RADIOLOGY TX PLNG CPLX: CPT

## 2021-02-10 NOTE — HISTORY OF PRESENT ILLNESS
[FreeTextEntry1] : Peggy Guevara is a 60 year old woman, seen with daughter who acted as  at patient's request, to discuss adjuvant radiation to right breast for early breast cancer\par \par Diagnosis: 0.2cm G2 IDC with associated DCIS 0.7cm (margin 0.7cm) recently diagnosed ER and [place] positive and HER2 negative. SNLB on right was negative (0/1). Left negative for malignancy.\par \par History of presenting complaint: \par \par 11/24/20 Mammogram suspicious for calcifications right breast, biopsy advised BI-RADS 4B\par \par 12/8/2020 Right SRS biopsy done on which initially showed intermediate grade DCIS, additional stains revealed invasive carcinoma.\par \par 12/17/20 breast MRI showed enhancement in the right breast at known site of malignancy, as well as 6mm lesion in left central breast, biopsy recommended. Path from left side was consistent with a fibroadenoma with concern for possible papillary lesion.\par \par 1/19/2021 bilateral breast lumpectomies using Loraine  localization, with right axillary sentinel lymph node biopsy. Left was negative for malignancy, right showed 0.2cm G2 tumor with DCIS.\par \par Some discomfort still noticed post surgery.  Taking only tylenol which helps. Seeing Dr. Garduno tomorrow.

## 2021-02-10 NOTE — REVIEW OF SYSTEMS
[Fatigue] : fatigue [Negative] : Neurological [Constipation: Grade 0] : Constipation: Grade 0 [Diarrhea: Grade 0] : Diarrhea: Grade 0 [Dysphagia: Grade 0] : Dysphagia: Grade 0 [Breast Pain: Grade 0] : Breast Pain: Grade 0 [Cough: Grade 0] : Cough: Grade 0 [Dyspnea: Grade 0] : Dyspnea: Grade 0

## 2021-02-10 NOTE — PHYSICAL EXAM
[Normal] : normal heart rate and rhythm, normal S1 and S2, and no murmurs present [de-identified] : bilateral breast scars, right axillary scar. Well healed, some bruising

## 2021-02-10 NOTE — VITALS
[Maximal Pain Intensity: 5/10] : 5/10 [Least Pain Intensity: 2/10] : 2/10 [Pain Description/Quality: ___] : Pain description/quality: [unfilled] [Pain Duration: ___] : Pain duration: [unfilled] [Pain Location: ___] : Pain Location: [unfilled] [Pain Interferes with ADLs] : Pain interferes with activities of daily living. [OTC] : OTC [ECOG Performance Status: 0 - Fully active, able to carry on all pre-disease performance without restriction] : Performance Status: 0 - Fully active, able to carry on all pre-disease performance without restriction [90: Able to carry normal activity; minor signs or symptoms of disease.] : 90: Able to carry normal activity; minor signs or symptoms of disease.

## 2021-02-11 ENCOUNTER — APPOINTMENT (OUTPATIENT)
Dept: HEMATOLOGY ONCOLOGY | Facility: CLINIC | Age: 61
End: 2021-02-11
Payer: COMMERCIAL

## 2021-02-11 VITALS
RESPIRATION RATE: 16 BRPM | DIASTOLIC BLOOD PRESSURE: 70 MMHG | TEMPERATURE: 97.2 F | BODY MASS INDEX: 22.37 KG/M2 | OXYGEN SATURATION: 98 % | SYSTOLIC BLOOD PRESSURE: 102 MMHG | WEIGHT: 112.44 LBS | HEIGHT: 59.45 IN | HEART RATE: 86 BPM

## 2021-02-11 PROCEDURE — 99205 OFFICE O/P NEW HI 60 MIN: CPT

## 2021-02-11 RX ORDER — ANASTROZOLE TABLETS 1 MG/1
1 TABLET ORAL DAILY
Qty: 30 | Refills: 5 | Status: DISCONTINUED | COMMUNITY
Start: 2021-02-11 | End: 2021-02-11

## 2021-02-16 ENCOUNTER — NON-APPOINTMENT (OUTPATIENT)
Age: 61
End: 2021-02-16

## 2021-02-16 PROCEDURE — 77290 THER RAD SIMULAJ FIELD CPLX: CPT | Mod: 26

## 2021-02-16 PROCEDURE — 77334 RADIATION TREATMENT AID(S): CPT | Mod: 26

## 2021-02-17 ENCOUNTER — APPOINTMENT (OUTPATIENT)
Dept: RADIOLOGY | Facility: HOSPITAL | Age: 61
End: 2021-02-17

## 2021-02-17 ENCOUNTER — OUTPATIENT (OUTPATIENT)
Dept: OUTPATIENT SERVICES | Facility: HOSPITAL | Age: 61
LOS: 1 days | End: 2021-02-17
Payer: SELF-PAY

## 2021-02-17 DIAGNOSIS — C50.911 MALIGNANT NEOPLASM OF UNSPECIFIED SITE OF RIGHT FEMALE BREAST: ICD-10-CM

## 2021-02-17 DIAGNOSIS — D35.2 BENIGN NEOPLASM OF PITUITARY GLAND: Chronic | ICD-10-CM

## 2021-02-17 PROCEDURE — 77080 DXA BONE DENSITY AXIAL: CPT

## 2021-02-17 PROCEDURE — 77080 DXA BONE DENSITY AXIAL: CPT | Mod: 26

## 2021-02-18 ENCOUNTER — NON-APPOINTMENT (OUTPATIENT)
Age: 61
End: 2021-02-18

## 2021-02-23 ENCOUNTER — OUTPATIENT (OUTPATIENT)
Dept: OUTPATIENT SERVICES | Facility: HOSPITAL | Age: 61
LOS: 1 days | End: 2021-02-23
Payer: SELF-PAY

## 2021-02-23 ENCOUNTER — APPOINTMENT (OUTPATIENT)
Dept: FAMILY MEDICINE | Facility: HOSPITAL | Age: 61
End: 2021-02-23

## 2021-02-23 VITALS
DIASTOLIC BLOOD PRESSURE: 79 MMHG | OXYGEN SATURATION: 98 % | HEART RATE: 93 BPM | SYSTOLIC BLOOD PRESSURE: 115 MMHG | WEIGHT: 114 LBS | BODY MASS INDEX: 22.68 KG/M2 | RESPIRATION RATE: 16 BRPM | TEMPERATURE: 97.6 F

## 2021-02-23 DIAGNOSIS — F32.1 MAJOR DEPRESSIVE DISORDER, SINGLE EPISODE, MODERATE: ICD-10-CM

## 2021-02-23 DIAGNOSIS — Z00.00 ENCOUNTER FOR GENERAL ADULT MEDICAL EXAMINATION WITHOUT ABNORMAL FINDINGS: ICD-10-CM

## 2021-02-23 DIAGNOSIS — D35.2 BENIGN NEOPLASM OF PITUITARY GLAND: Chronic | ICD-10-CM

## 2021-02-23 DIAGNOSIS — R53.83 OTHER FATIGUE: ICD-10-CM

## 2021-02-23 PROCEDURE — 77300 RADIATION THERAPY DOSE PLAN: CPT | Mod: 26

## 2021-02-23 PROCEDURE — 82274 ASSAY TEST FOR BLOOD FECAL: CPT

## 2021-02-23 PROCEDURE — 77295 3-D RADIOTHERAPY PLAN: CPT | Mod: 26

## 2021-02-23 PROCEDURE — 80053 COMPREHEN METABOLIC PANEL: CPT

## 2021-02-23 PROCEDURE — 77334 RADIATION TREATMENT AID(S): CPT | Mod: 26

## 2021-02-23 PROCEDURE — G0463: CPT

## 2021-02-23 PROCEDURE — 85025 COMPLETE CBC W/AUTO DIFF WBC: CPT

## 2021-02-23 RX ORDER — OMEPRAZOLE 20 MG/1
20 TABLET, DELAYED RELEASE ORAL
Refills: 0 | Status: COMPLETED | COMMUNITY
Start: 2020-12-10 | End: 2021-02-23

## 2021-02-24 DIAGNOSIS — R53.83 OTHER FATIGUE: ICD-10-CM

## 2021-02-24 NOTE — PHYSICAL EXAM
[No Acute Distress] : no acute distress [Well-Appearing] : well-appearing [No Respiratory Distress] : no respiratory distress  [No Accessory Muscle Use] : no accessory muscle use [Clear to Auscultation] : lungs were clear to auscultation bilaterally [Normal Rate] : normal rate  [Normal S1, S2] : normal S1 and S2 [Soft] : abdomen soft [Non Tender] : non-tender [Non-distended] : non-distended [No Masses] : no abdominal mass palpated [No HSM] : no HSM [Normal Bowel Sounds] : normal bowel sounds [Normal Posterior Cervical Nodes] : no posterior cervical lymphadenopathy [Normal Anterior Cervical Nodes] : no anterior cervical lymphadenopathy [No CVA Tenderness] : no CVA  tenderness [No Spinal Tenderness] : no spinal tenderness [Coordination Grossly Intact] : coordination grossly intact [Normal Gait] : normal gait [de-identified] : no epigastric TTP, negative ross's sign [de-identified] : tearful

## 2021-02-24 NOTE — REVIEW OF SYSTEMS
[Abdominal Pain] : abdominal pain [Nausea] : nausea [Constipation] : no constipation [Vomiting] : no vomiting [Heartburn] : no heartburn [Melena] : no melena [Suicidal] : not suicidal [Insomnia] : insomnia [Anxiety] : anxiety [Depression] : depression [Negative] : Respiratory [FreeTextEntry7] : epigastic and RUQ pain after eating

## 2021-02-24 NOTE — PLAN
[FreeTextEntry1] : #Epigastric Pain\par -CBC, CMP, FIT ordered\par -Previous w/u in 10/2020 negative, lipase negative, CT and US imaging also negative\par -Continue PPI Omeprazole 40mg BID\par -Cont Famotidine 20mg QD\par -Con carafate\par -WIll call GI to move appt sooner in anticipation of starting radiation treatment, may need r/p colonoscopy/endoscopy Dr. James [phone]\par -Was last seen by GI 6/16/20; due for colonoscopy at end of 2020 but was not able to make appt. \par \par #Breast CA\par -To begin radiation tx this friday\par \par #Insomnia\par -Increase trazodone to 100mg qhs\par \par #Mod depression\par -PHQ-9 11 today\par -Will not begin pharmacological treatment today\par -no SI/HI, has a lot of family support at home\par -SW referral with [facility]\par \par Case d/w Dr. Mesa \par

## 2021-02-24 NOTE — HISTORY OF PRESENT ILLNESS
[de-identified] : 59 y/o F with R breast invasive ductal carcinoma presenting for f/u of symptoms and continued c/o epigastric pain/RUQ pain. \par DCIS grade 2 ER+/RI+\par S/p b/l lumpectomy on 1/19; R breast found to have IDC, L breast benign. Single lymph node biopsy was negative. \par Scheduled for radiation treatment to begin this Friday, 2/26/21\par Continued c/o epigastric pain radiating to RUQ mostly 30ming -1 hr after eating. Occasional nausea w/o vomiting, no diarrhea. No change with change in diet. Feeling very anxious about current health and scheduled radiation. Made appt with GI but not until July 2021\par Recent DEXA showing osteopenia of L forearm\par Difficulty sleeping, falling asleep and trouble getting back to sleep after waking up. Slept 3 hours last night. awoke at 1 am and could not get back to sleep.\par Very tearful during interview today.

## 2021-02-25 PROCEDURE — 77280 THER RAD SIMULAJ FIELD SMPL: CPT | Mod: 26

## 2021-02-28 NOTE — ASSESSMENT
[FreeTextEntry1] : Stage I right breast cancer status post bilateral lumpectomy under Loraine  localization, right axillary sentinel node biopsy 01/19/2021\par 16 cc's straw colored fluid from post op seroma. \par Pt clinically improved\par Continue follow-up with medical and radiation oncology re adjuvant treatment\par RTO 3 months or PRN \par

## 2021-02-28 NOTE — PROCEDURE
[FreeTextEntry1] : Ultrasound-guided aspiration right breast seroma performed under sterile conditions using 1% lidocaine with epinephrine\par 18-gauge needle used\par 16 cc straw-colored fluid aspirated and discarded\par Sterile dressing applied\par Patient tolerated procedure well

## 2021-02-28 NOTE — HISTORY OF PRESENT ILLNESS
[de-identified] : Pt is a 59 y/o female who presents for an initial postop visit.\par \par She was initially seen in consultation on 12/16/20 for right breast cancer ER+/NH+, HER-2 negative. \par She is status post right breast stereotactic biopsy on December 8, 2020 that initially showed intermediate grade DCIS but additional staining revealed invasive carcinoma.\par \par Mammogram (11/24/20): Suspicious calcifications right breast. Stereotactic biopsy advised. Recommendation of stereotactic biopsy. BI-RADS 4B. Stereotactic biopsy was ultimately consistent with invasive ductal carcinoma and DCIS, ER+/NH+/HER2-. \par \par At the time of her initial visit she c/o of difficulty digesting certain foods associated with sharp stomach pain and vomiting - EGD, colonoscopy neg.  Pt. is on PPI bid.\par \par Breast MRI on 12/17/20 showed enhancement in the right breast at the site of known malignancy, as well as an approximately 6 mm enhancing area in the central left breast for which MRI guided biopsy was recommended.  Following MRI biopsy, pathology was consistent with a fibroadenoma with concern for a possible papillary lesion.\par \par She is now status post bilateral breast lumpectomies and right axillary sentinel lymph node biopsy on 1/19/21.  Final pathology of the left breast was benign.  Pathology of the right breast was 0.2 cm invasive ductal carcinoma and DCIS with one negative right axillary sentinel lymph node and negative margins (T1aN0).\par \par Pt reports no family history of breast cancer. \par Denies palpable breast masses, nipple discharge, nipple retraction/inversion, or skin changes.\par \par

## 2021-02-28 NOTE — REASON FOR VISIT
[Post-Op] : a post-op for [FreeTextEntry2] : status post bilateral breast lumpectomies and right axillary sentinel lymph node biopsy on 1/19/21

## 2021-02-28 NOTE — ASSESSMENT
[FreeTextEntry1] : Status post bilateral breast lumpectomies and right axillary sentinel lymph node biopsy on 1/19/21.  \par Pathology of the right breast was 0.2 cm invasive ductal carcinoma and DCIS with one negative right axillary sentinel lymph node and negative margins (T1aN0).\par Final pathology of the left breast was benign.  \par Normal postoperative course\par We will refer to medical and radiation oncology to discuss adjuvant treatment\par RTO 3 months\par \par Enclosed pathology report\par

## 2021-02-28 NOTE — PHYSICAL EXAM
[FreeTextEntry1] : COVID -19 precautions as per [facility] policy was universally followed. \par KN present during exam  [de-identified] : right breast incision healing well with symptomatic right breast seroma.\par sterile dressed applied. no masses and adenopathy.

## 2021-02-28 NOTE — REASON FOR VISIT
[Post-Op] : a post-op for [FreeTextEntry2] : s/p bilateral breast lumpectomies and right axillary sentinel lymph node biopsy on 1/19/21

## 2021-02-28 NOTE — HISTORY OF PRESENT ILLNESS
[de-identified] : Pt is a 61 y/o female who presents for an initial postop visit.\par \par She is Bilateral lumpectomy under Loraine  localization, right axillary sentinel node biopsy 01/19/2021: pathology confirming 1. left breast lumpectomy 10:00 - benign breast + fibroadipose tissue with focal biopsy site changes. 2. Bul[place] lymph node, right axilla, biopsy- one lymph node negative for metastatic carcinoma (0/1). 3. right breast lumpectomy 10:00- invasive ductal carcinoma, marlin grade 2 (tubule formation: 3, nuclear pleomorphism: 2, mitotic activity: 1; total score 6/9). Invasive tumor measures 0.2 cm in greatest dimensions. Ductal carcinoma in situ, nuclear grade 2, cribriform and solid types with focal calcifications. The DCIS spasms at a distance of approximately 0.7 cm in greatest dimension. No lymphovascular invasion identified. The Resection margins are negative for carcinoma. The invasive tumor and DCIS are 0.7 cm from the nearest margin.  \par \par previously, she had a consultation on 12/16/20 for right breast cancer ER+/MI+, HER-2 negative. \par She is status post right breast stereotactic biopsy on December 8, 2020 that initially showed intermediate grade DCIS but additional staining revealed invasive carcinoma.\par \par Mammogram (11/24/20): Suspicious calcifications right breast. Stereotactic biopsy advised. Recommendation of stereotactic biopsy. BI-RADS 4B. Stereotactic biopsy was ultimately consistent with invasive ductal carcinoma and DCIS, ER+/MI+/HER2-. \par \par At the time of her initial visit she c/o of difficulty digesting certain foods associated with sharp stomach pain and vomiting - EGD, colonoscopy neg.  Pt. is on PPI bid.\par \par Breast MRI on 12/17/20 showed enhancement in the right breast at the site of known malignancy, as well as an approximately 6 mm enhancing area in the central left breast for which MRI guided biopsy was recommended.  Following MRI biopsy, pathology was consistent with a fibroadenoma with concern for a possible papillary lesion.\par \par She is now status post bilateral breast lumpectomies and right axillary sentinel lymph node biopsy on 1/19/21.  Final pathology of the left breast was benign.  Pathology of the right breast was 0.2 cm invasive ductal carcinoma and DCIS with one negative right axillary sentinel lymph node and negative margins (T1aN0).\par \par Pt reports no family history of breast cancer. \par \par Today the pt was w/ slight discomfort in right breast. Incision is healing well. No evidence of hematoma, seroma, or infection. \par Denies palpable breast masses, nipple discharge, nipple retraction/inversion, or skin changes.\par Incision is healing well. No evidence of hematoma, seroma, or infection.

## 2021-03-02 ENCOUNTER — NON-APPOINTMENT (OUTPATIENT)
Age: 61
End: 2021-03-02

## 2021-03-02 NOTE — HISTORY OF PRESENT ILLNESS
[FreeTextEntry1] : Peggy Guevara is a 60 year old woman seen with daughter( Maria Victoria)\par \par Diagnosis: 0.2cm G2 IDC with associated DCIS 0.7cm (margin 0.7cm) recently diagnosed ER and [place] positive and HER2 negative. SNLB on right was negative (0/1). Left negative for malignancy.\par \par 3/2/21-3/20 fx- Fatigue, anxiety, no skin reaction yet, requesting to speak with SW about anxiety. We discussed wellness and skincare during radiation

## 2021-03-02 NOTE — REVIEW OF SYSTEMS
[Fatigue: Grade 1 - Fatigue relieved by rest] : Fatigue: Grade 1 - Fatigue relieved by rest [Breast Pain: Grade 0] : Breast Pain: Grade 0 [Dermatitis Radiation: Grade 0] : Dermatitis Radiation: Grade 0 [Anxiety: Grade 1 - Mild symptoms; intervention not indicated] : Anxiety: Grade 1 - Mild symptoms; intervention not indicated

## 2021-03-02 NOTE — REASON FOR VISIT
[Breast Cancer] : breast cancer [Routine On-Treatment] : a routine on-treatment visit for [Family Member] : family member

## 2021-03-02 NOTE — DISEASE MANAGEMENT
[Pathological] : TNM Stage: p [IA] : IA [TTNM] : 1 [NTNM] : 0 [MTNM] : 0 [de-identified] : right breast

## 2021-03-03 ENCOUNTER — NON-APPOINTMENT (OUTPATIENT)
Age: 61
End: 2021-03-03

## 2021-03-04 PROCEDURE — 77427 RADIATION TX MANAGEMENT X5: CPT

## 2021-03-09 ENCOUNTER — NON-APPOINTMENT (OUTPATIENT)
Age: 61
End: 2021-03-09

## 2021-03-09 NOTE — DISEASE MANAGEMENT
[Pathological] : TNM Stage: p [IA] : IA [TTNM] : 1 [NTNM] : 0 [MTNM] : 0 [de-identified] : right breast

## 2021-03-09 NOTE — HISTORY OF PRESENT ILLNESS
[FreeTextEntry1] : Peggy Guevara is a 60 year old woman seen with daughter( Maria Victoria)\par \par Diagnosis: 0.2cm G2 IDC with associated DCIS 0.7cm (margin 0.7cm) recently diagnosed ER and [place] positive and HER2 negative. SNLB on right was negative (0/1). Left negative for malignancy.\par \par 3/9/21-8/20 fx still with anxiety, poor sleep of about 1hr per night only fatigue, no skin reaction. Appt. with Dr. Jennings 3/17/21\par \par 3/2/21-3/20 fx- Fatigue, anxiety, no skin reaction yet, requesting to speak with SW about anxiety. We discussed wellness and skincare during radiation

## 2021-03-09 NOTE — REVIEW OF SYSTEMS
[Fatigue: Grade 1 - Fatigue relieved by rest] : Fatigue: Grade 1 - Fatigue relieved by rest [Breast Pain: Grade 0] : Breast Pain: Grade 0 [Anxiety: Grade 1 - Mild symptoms; intervention not indicated] : Anxiety: Grade 1 - Mild symptoms; intervention not indicated [Dermatitis Radiation: Grade 1 - Faint erythema or dry desquamation] : Dermatitis Radiation: Grade 1 - Faint erythema or dry desquamation

## 2021-03-10 ENCOUNTER — APPOINTMENT (OUTPATIENT)
Dept: FAMILY MEDICINE | Facility: HOSPITAL | Age: 61
End: 2021-03-10

## 2021-03-10 ENCOUNTER — OUTPATIENT (OUTPATIENT)
Dept: OUTPATIENT SERVICES | Facility: HOSPITAL | Age: 61
LOS: 1 days | End: 2021-03-10
Payer: SELF-PAY

## 2021-03-10 VITALS
DIASTOLIC BLOOD PRESSURE: 84 MMHG | TEMPERATURE: 97.7 F | RESPIRATION RATE: 16 BRPM | OXYGEN SATURATION: 98 % | WEIGHT: 114 LBS | SYSTOLIC BLOOD PRESSURE: 137 MMHG | HEART RATE: 97 BPM | BODY MASS INDEX: 22.68 KG/M2

## 2021-03-10 DIAGNOSIS — D35.2 BENIGN NEOPLASM OF PITUITARY GLAND: Chronic | ICD-10-CM

## 2021-03-10 DIAGNOSIS — Z01.818 ENCOUNTER FOR OTHER PREPROCEDURAL EXAMINATION: ICD-10-CM

## 2021-03-10 DIAGNOSIS — Z00.00 ENCOUNTER FOR GENERAL ADULT MEDICAL EXAMINATION WITHOUT ABNORMAL FINDINGS: ICD-10-CM

## 2021-03-10 DIAGNOSIS — Z20.822 CONTACT WITH AND (SUSPECTED) EXPOSURE TO COVID-19: ICD-10-CM

## 2021-03-10 PROCEDURE — G0463: CPT

## 2021-03-10 RX ORDER — TRAZODONE HYDROCHLORIDE 100 MG/1
100 TABLET ORAL
Qty: 30 | Refills: 3 | Status: COMPLETED | COMMUNITY
Start: 2020-07-06 | End: 2021-03-10

## 2021-03-11 ENCOUNTER — APPOINTMENT (OUTPATIENT)
Dept: NEUROLOGY | Facility: HOSPITAL | Age: 61
End: 2021-03-11

## 2021-03-11 ENCOUNTER — OUTPATIENT (OUTPATIENT)
Dept: OUTPATIENT SERVICES | Facility: HOSPITAL | Age: 61
LOS: 1 days | End: 2021-03-11
Payer: SELF-PAY

## 2021-03-11 VITALS
SYSTOLIC BLOOD PRESSURE: 128 MMHG | DIASTOLIC BLOOD PRESSURE: 84 MMHG | HEIGHT: 59.45 IN | WEIGHT: 112 LBS | BODY MASS INDEX: 22.28 KG/M2 | HEART RATE: 98 BPM | RESPIRATION RATE: 14 BRPM | TEMPERATURE: 97.5 F

## 2021-03-11 DIAGNOSIS — F32.9 MAJOR DEPRESSIVE DISORDER, SINGLE EPISODE, UNSPECIFIED: ICD-10-CM

## 2021-03-11 DIAGNOSIS — D35.2 BENIGN NEOPLASM OF PITUITARY GLAND: Chronic | ICD-10-CM

## 2021-03-11 DIAGNOSIS — G47.00 INSOMNIA, UNSPECIFIED: ICD-10-CM

## 2021-03-11 DIAGNOSIS — R56.9 UNSPECIFIED CONVULSIONS: ICD-10-CM

## 2021-03-11 PROBLEM — Z01.818 PREOP TESTING: Status: RESOLVED | Noted: 2020-09-12 | Resolved: 2021-03-11

## 2021-03-11 PROBLEM — Z20.822 ENCOUNTER FOR SCREENING LABORATORY TESTING FOR COVID-19 VIRUS: Status: RESOLVED | Noted: 2021-01-19 | Resolved: 2021-03-11

## 2021-03-11 PROCEDURE — G0463: CPT

## 2021-03-11 NOTE — ASSESSMENT
[FreeTextEntry1] : 59 y/o F PMHx R breast CA s/p lumpectomy w/ radiation, presenting for f/u of insomnia and anxiety

## 2021-03-11 NOTE — HISTORY OF PRESENT ILLNESS
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 Previously has been on Zolpidem, Clonazepam, Indocin, Topamax, and Amitriptyline. As noted in prior visits, patient previously reported headaches were related to lack of sleep. Amitryptyline was discontinued and patient was started on Trazodone 50mg qHS which she tolerated well. Patient reports Trazodone was increased to 100mg qHS by an outside provider but reports worsening insomnia for the past 2 weeks. She reports the headaches have improved overall but sometimes occur with worsening sleep. She also notes weight loss of 7kg since October due to stomach pain, which has been improving gradually over the last few days. Of note she reports her puppy passed away recently. She denies SI/HI but reports having a difficult time feeling happy due to poor sleep. 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

## 2021-03-11 NOTE — PHYSICAL EXAM
[General Appearance - Alert] : alert [General Appearance - In No Acute Distress] : in no acute distress [General Appearance - Well Nourished] : well nourished [General Appearance - Well Developed] : well developed [General Appearance - Well-Appearing] : healthy appearing [Oriented To Time, Place, And Person] : oriented to person, place, and time [Impaired Insight] : insight and judgment were intact [Affect] : the affect was normal [Cranial Nerves Optic (II)] : visual acuity intact bilaterally,  visual fields full to confrontation, pupils equal round and reactive to light [Cranial Nerves Oculomotor (III)] : extraocular motion intact [Cranial Nerves Trigeminal (V)] : facial sensation intact symmetrically [Cranial Nerves Facial (VII)] : face symmetrical [Cranial Nerves Vestibulocochlear (VIII)] : hearing was intact bilaterally [Cranial Nerves Glossopharyngeal (IX)] : tongue and palate midline [Cranial Nerves Accessory (XI - Cranial And Spinal)] : head turning and shoulder shrug symmetric [Cranial Nerves Hypoglossal (XII)] : there was no tongue deviation with protrusion [Motor Tone] : muscle tone was normal in all four extremities [Motor Strength] : muscle strength was normal in all four extremities [Involuntary Movements] : no involuntary movements were seen [No Muscle Atrophy] : normal bulk in all four extremities [Sensation Tactile Decrease] : light touch was intact [Abnormal Walk] : normal gait [Balance] : balance was intact [2+] : Brachioradialis left 2+ [0] : Ankle jerk left 0 [Sclera] : the sclera and conjunctiva were normal [PERRL With Normal Accommodation] : pupils were equal in size, round, reactive to light, with normal accommodation [Extraocular Movements] : extraocular movements were intact [Full Visual Field] : full visual field [Hearing Threshold Finger Rub Not [place]] : hearing was normal [Neck Appearance] : the appearance of the neck was normal [] : no respiratory distress [Exaggerated Use Of Accessory Muscles For Inspiration] : no accessory muscle use [3+] : Patella left 3+ [FreeTextEntry1] : Mildly emotional and tearful [Limited Balance] : balance was intact [Past-pointing] : there was no past-pointing [Tremor] : no tremor present [Dysdiadochokinesia Bilaterally] : not present [Coordination - Dysmetria Impaired Finger-to-Nose Bilateral] : not present [Coordination - Dysmetria Impaired Heel-to-Shin Bilateral] : not present [Plantar Reflex Right Only] : normal on the right [Plantar Reflex Left Only] : normal on the left [___] : absent on the right [___] : absent on the left

## 2021-03-11 NOTE — PLAN
[FreeTextEntry1] : #Insomnia\par -Stop trazodone\par -Start clonazepam 0.5mg PO qhs PRN insomnia x 10 days\par -Pt understands this is short term course for acute relief but not long term solution\par -F/u in 1 week to assess improved sleep\par \par #Anxiety\par -Pt is agreeable to starting sertraline 25mg PO QD\par -Will reassess symptoms at next visit\par -SW referral with [facility] pt understands this in addition to pharmacological management will help with symptoms of anxiety and adjustment issue related to recent diagnosis and tx of breast CA\par \par F/u in 1 week

## 2021-03-11 NOTE — REVIEW OF SYSTEMS
[Fever] : no fever [Chills] : no chills [Fatigue] : fatigue [Chest Pain] : no chest pain [Palpitations] : no palpitations [Shortness Of Breath] : no shortness of breath [Cough] : no cough [Abdominal Pain] : no abdominal pain [Nausea] : no nausea [Vomiting] : no vomiting [Headache] : no headache [Memory Loss] : no memory loss [Suicidal] : not suicidal [Insomnia] : insomnia [Anxiety] : anxiety [Depression] : no depression

## 2021-03-11 NOTE — ASSESSMENT
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 Patient recently switched from Amitriptyline to trazodone which was increased to 100mg qHS by an otuside provider with no relief. Physical exam remarkable for mildly emotional affect with no focal deficit.\par \par Impression: Insomnia, increased anxiety, and poor appetite likely due to depression with multiple underlying stressors including recent breast cancer diagnosis and passing of her dog.\par \par Plan:\par - Start Remeron 7.5mg qHS for insomnia, appetite stimulation, and possible depression\par - Discontinue Trazodone\par - Referral to Psychiatry\par - RTC in 2 months\par \par Case discussed with Dr. Beverly.

## 2021-03-11 NOTE — PHYSICAL EXAM
[No Acute Distress] : no acute distress [Well-Appearing] : well-appearing [No Respiratory Distress] : no respiratory distress  [Clear to Auscultation] : lungs were clear to auscultation bilaterally [Normal Rate] : normal rate  [Regular Rhythm] : with a regular rhythm [Normal S1, S2] : normal S1 and S2 [Soft] : abdomen soft [Non Tender] : non-tender [Non-distended] : non-distended [No Masses] : no abdominal mass palpated [No HSM] : no HSM [Coordination Grossly Intact] : coordination grossly intact [Normal Gait] : normal gait [Normal Affect] : the affect was normal [Alert and Oriented x3] : oriented to person, place, and time [Normal Insight/Judgement] : insight and judgment were intact

## 2021-03-11 NOTE — HISTORY OF PRESENT ILLNESS
[FreeTextEntry1] : f/u insomnia and anxiety [de-identified] : 59 y/o F PMHx R breast CA s/p lumpectomy w/ radiation, presenting for f/u of insomnia and anxiety. Recieved 9 rounds of radiation, 20 total to be complete over the next 2 weeks approx. Trazodone was increased to 100mg at last visit but pt reports no relief. Does not want to continue taking or increase the dose. Had procedure done in the past and was given clonazepam for similar symptoms with relief. Appearing more irritable today due to lack of sleep; not sleeping throughout day. Agreeable to starting SSRI for anxiety soon. \par Reports depressive symptoms are improved but experiencing more anxiety and irritability due to lack of sleep. Denies SI/HI.

## 2021-03-12 ENCOUNTER — OUTPATIENT (OUTPATIENT)
Dept: OUTPATIENT SERVICES | Facility: HOSPITAL | Age: 61
LOS: 1 days | Discharge: HOME | End: 2021-03-12

## 2021-03-12 DIAGNOSIS — G47.00 INSOMNIA, UNSPECIFIED: ICD-10-CM

## 2021-03-12 DIAGNOSIS — C50.919 MALIGNANT NEOPLASM OF UNSPECIFIED SITE OF UNSPECIFIED FEMALE BREAST: ICD-10-CM

## 2021-03-12 DIAGNOSIS — D35.2 BENIGN NEOPLASM OF PITUITARY GLAND: Chronic | ICD-10-CM

## 2021-03-12 PROCEDURE — 77427 RADIATION TX MANAGEMENT X5: CPT

## 2021-03-16 ENCOUNTER — APPOINTMENT (OUTPATIENT)
Dept: GASTROENTEROLOGY | Facility: HOSPITAL | Age: 61
End: 2021-03-16

## 2021-03-16 ENCOUNTER — OUTPATIENT (OUTPATIENT)
Dept: OUTPATIENT SERVICES | Facility: HOSPITAL | Age: 61
LOS: 1 days | End: 2021-03-16
Payer: SELF-PAY

## 2021-03-16 ENCOUNTER — NON-APPOINTMENT (OUTPATIENT)
Age: 61
End: 2021-03-16

## 2021-03-16 VITALS
DIASTOLIC BLOOD PRESSURE: 79 MMHG | HEART RATE: 98 BPM | TEMPERATURE: 96.2 F | SYSTOLIC BLOOD PRESSURE: 114 MMHG | HEIGHT: 59 IN | WEIGHT: 114 LBS | BODY MASS INDEX: 22.98 KG/M2

## 2021-03-16 DIAGNOSIS — K31.89 OTHER DISEASES OF STOMACH AND DUODENUM: ICD-10-CM

## 2021-03-16 DIAGNOSIS — D35.2 BENIGN NEOPLASM OF PITUITARY GLAND: Chronic | ICD-10-CM

## 2021-03-16 DIAGNOSIS — Z83.79 FAMILY HISTORY OF OTHER DISEASES OF THE DIGESTIVE SYSTEM: ICD-10-CM

## 2021-03-16 DIAGNOSIS — K57.32 DIVERTICULITIS OF LARGE INTESTINE WITHOUT PERFORATION OR ABSCESS WITHOUT BLEEDING: ICD-10-CM

## 2021-03-16 DIAGNOSIS — R10.13 EPIGASTRIC PAIN: ICD-10-CM

## 2021-03-16 DIAGNOSIS — K21.00 GASTRO-ESOPHAGEAL REFLUX DISEASE WITH ESOPHAGITIS, WITHOUT BLEEDING: ICD-10-CM

## 2021-03-16 DIAGNOSIS — R10.9 UNSPECIFIED ABDOMINAL PAIN: ICD-10-CM

## 2021-03-16 PROCEDURE — 77280 THER RAD SIMULAJ FIELD SMPL: CPT | Mod: 26

## 2021-03-16 PROCEDURE — G0463: CPT

## 2021-03-16 NOTE — REVIEW OF SYSTEMS
[Fatigue: Grade 1 - Fatigue relieved by rest] : Fatigue: Grade 1 - Fatigue relieved by rest [Breast Pain: Grade 0] : Breast Pain: Grade 0 [Anxiety: Grade 1 - Mild symptoms; intervention not indicated] : Anxiety: Grade 1 - Mild symptoms; intervention not indicated [Dermatitis Radiation: Grade 1 - Faint erythema or dry desquamation] : Dermatitis Radiation: Grade 1 - Faint erythema or dry desquamation stated

## 2021-03-16 NOTE — HISTORY OF PRESENT ILLNESS
[FreeTextEntry1] : Peggy Guevara is a 60 year old woman seen with daughter( Maria Victoria)\par \par Diagnosis: 0.2cm G2 IDC with associated DCIS 0.7cm (margin 0.7cm) recently diagnosed ER and [place] positive and HER2 negative. SNLB on right was negative (0/1). Left negative for malignancy.\par \par 3/16/21-12/20 fx -feeling much better after neurology appointment. Denies breast pain. Sleeping on new meds ordered by neuro. Will f/u with psych.\par \par 3/9/21-8/20 fx still with anxiety, poor sleep of about 1hr per night only fatigue, no skin reaction. Appt. with Dr. Jennings 3/17/21\par \par 3/2/21-3/20 fx- Fatigue, anxiety, no skin reaction yet, requesting to speak with SW about anxiety. We discussed wellness and skincare during radiation

## 2021-03-16 NOTE — HISTORY OF PRESENT ILLNESS
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 Pylori (2011) s/p treatment with confirmed eradication, tubular adenomas of colon (last colon 9/20), hepatic steatosis, diverticulitis (4 courses of antibiotics 6/2017, 7/2017 and 8/2017, 4/20) and presenting for follow up visit. \par \par Patient reports 5 months of epigastric pain, sharp, lasts several hours and appears about an hour after a meal. No relation to specific foods. No melena or hematochezia. Endorses 12lbs unintentional weight loss in the past 6 months. No nausea or vomiting. Reports daily normal bowel movement. Reports relief with PPI 40mg BID as prescribed by her PCP.\par \par She had EGD in August 2017 that was normal including gastric and duodenal biopsies. \par She has no family history of colon cancers, stomach cancer or gallstones, but her sister never had a colonoscopy. \par \par Colonoscopy 9/20 - normal, plan for repeat in 5 years.\par \par Colonoscopy (10/2017):\par Diverticulosis, 2 mm sigmoid polyp (hyperplastic)\par \par Colonoscopy (6/2016):\par Impression: - Non- thrombosed external hemorrhoids found on perianal exam.\par  - Bleeding internal hemorrhoids.\par  - Diverticulosis in the sigmoid colon, in the descending colon and in the \par  transverse colon.\par  - One 8 mm polyp in the transverse colon. Resected and retrieved.\par  - One 10 mm polyp in the transverse colon. Resected and retrieved. Clip was \par  placed.\par  - One 10 mm polyp in the transverse colon. Resected and retrieved. Clip was \par  placed.\par  - The examined portion of the ileum was normal.\par Pathology:\par Final Diagnosis\par 1. Colon, transverse, polyp #1, biopsy\par - Tubular adenoma.\par \par 2. Colon, transverse, polyp #2, biopsy\par - Tubular adenoma(s).\par \par 3. Colon, transverse, polyp #3, biopsy\par - Tubular adenoma.\par \par Upper Endoscopy (3/2011)\par Impression: - LA Grade A esophagitis.\par  - Z-line regular, 37 cm from the incisors.\par  - Gastritis. This was biopsied.\par  - Normal duodenal bulb.\par  - Normal 2nd part of the duodenum. This was biopsied.\par Pathology:\par 1.Stomach, antrum, biopsy\par - chronic active gastritis, severe, Warthin Starry stain is\par positive for H.pylori like\par microorganisms.\par - focal intestinal metaplasia is also identified, negative for\par dysplasia.\par \par \par US abdomen (7/21/17): Hepatic steatosis, no gallstones or gallbladder abnormalities, CBD 4mm\par AST 39, ALT 30, ALP 72, T. bili 0.4. \par Fibroscan F0SO. \par

## 2021-03-16 NOTE — ASSESSMENT
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 Pylori (2011) s/p treatment with confirmed eradication, tubular adenomas of colon (last colon 9/20), hepatic steatosis, diverticulitis (4 courses of antibiotics 6/2017, 7/2017 and 8/2017, 4/20) and presenting for follow up visit. \par \par # Abdominal pain - epigastric, related to meals. Not related to defecation. Recent endoscopy done as outpatient (no records on file) with normal biopsies. DDX includes biliary etiology vs. functional pain vs. reflux/PUD vs. gastroparesis.\par # Recurrent diverticulitis\par # Intestinal metaplasia of gastric mucosa\par \par Recommendations:\par - NM emptying study\par - RUQ US\par - Surgery consult for recurrent diverticulitis\par - Requested patient to obtain endoscopy records\par - RTC in 6 weeks\par \par Discussed with Dr. Ramirez.

## 2021-03-16 NOTE — DISEASE MANAGEMENT
[Pathological] : TNM Stage: p [IA] : IA [TTNM] : 1 [NTNM] : 0 [MTNM] : 0 [de-identified] : right breast

## 2021-03-16 NOTE — PHYSICAL EXAM
[General Appearance - Alert] : alert [General Appearance - In No Acute Distress] : in no acute distress [Sclera] : the sclera and conjunctiva were normal [PERRL With Normal Accommodation] : pupils were equal in size, round, and reactive to light [Extraocular Movements] : extraocular movements were intact [Outer Ear] : the ears and nose were normal in appearance [Oropharynx] : the oropharynx was normal [Neck Appearance] : the appearance of the neck was normal [Neck Cervical Mass (___cm)] : no neck mass was observed [Jugular Venous Distention Increased] : there was no jugular-venous distention [Thyroid Diffuse Enlargement] : the thyroid was not enlarged [Thyroid Nodule] : there were no palpable thyroid nodules [Heart Rate And Rhythm] : heart rate was normal and rhythm regular [Bowel Sounds] : normal bowel sounds [Abdomen Soft] : soft [Abdomen Tenderness] : non-tender [] : no hepato-splenomegaly [Abdomen Mass (___ Cm)] : no abdominal mass palpated [Abnormal Walk] : normal gait [Skin Color & Pigmentation] : normal skin color and pigmentation [No Focal Deficits] : no focal deficits

## 2021-03-17 ENCOUNTER — APPOINTMENT (OUTPATIENT)
Dept: FAMILY MEDICINE | Facility: HOSPITAL | Age: 61
End: 2021-03-17

## 2021-03-17 ENCOUNTER — APPOINTMENT (OUTPATIENT)
Dept: ULTRASOUND IMAGING | Facility: HOSPITAL | Age: 61
End: 2021-03-17
Payer: COMMERCIAL

## 2021-03-17 ENCOUNTER — APPOINTMENT (OUTPATIENT)
Dept: HEMATOLOGY ONCOLOGY | Facility: CLINIC | Age: 61
End: 2021-03-17
Payer: COMMERCIAL

## 2021-03-17 ENCOUNTER — OUTPATIENT (OUTPATIENT)
Dept: OUTPATIENT SERVICES | Facility: HOSPITAL | Age: 61
LOS: 1 days | End: 2021-03-17
Payer: SELF-PAY

## 2021-03-17 DIAGNOSIS — D35.2 BENIGN NEOPLASM OF PITUITARY GLAND: Chronic | ICD-10-CM

## 2021-03-17 DIAGNOSIS — F32.9 MAJOR DEPRESSIVE DISORDER, SINGLE EPISODE, UNSPECIFIED: ICD-10-CM

## 2021-03-17 DIAGNOSIS — R10.11 RIGHT UPPER QUADRANT PAIN: ICD-10-CM

## 2021-03-17 PROCEDURE — 76705 ECHO EXAM OF ABDOMEN: CPT | Mod: 26

## 2021-03-17 PROCEDURE — 76705 ECHO EXAM OF ABDOMEN: CPT

## 2021-03-17 PROCEDURE — 90785 PSYTX COMPLEX INTERACTIVE: CPT | Mod: 95

## 2021-03-17 PROCEDURE — 90791 PSYCH DIAGNOSTIC EVALUATION: CPT | Mod: 95

## 2021-03-17 RX ORDER — MIRTAZAPINE 7.5 MG/1
7.5 TABLET, FILM COATED ORAL
Qty: 30 | Refills: 2 | Status: DISCONTINUED | COMMUNITY
Start: 2021-03-11 | End: 2021-03-17

## 2021-03-18 ENCOUNTER — NON-APPOINTMENT (OUTPATIENT)
Age: 61
End: 2021-03-18

## 2021-03-19 PROCEDURE — 77427 RADIATION TX MANAGEMENT X5: CPT

## 2021-03-22 ENCOUNTER — NON-APPOINTMENT (OUTPATIENT)
Age: 61
End: 2021-03-22

## 2021-03-23 ENCOUNTER — NON-APPOINTMENT (OUTPATIENT)
Age: 61
End: 2021-03-23

## 2021-03-23 NOTE — REVIEW OF SYSTEMS
[Fatigue: Grade 1 - Fatigue relieved by rest] : Fatigue: Grade 1 - Fatigue relieved by rest [Breast Pain: Grade 1 - Mild pain] : Breast Pain: Grade 1 - Mild pain [Dermatitis Radiation: Grade 1 - Faint erythema or dry desquamation] : Dermatitis Radiation: Grade 1 - Faint erythema or dry desquamation [FreeTextEntry6] : right breast

## 2021-03-23 NOTE — HISTORY OF PRESENT ILLNESS
[FreeTextEntry1] : Peggy Guevara is a 60 year old woman seen with daughter( Maria Victoria) for on treatment review\par \par Diagnosis: 0.2cm G2 IDC with associated DCIS 0.7cm (margin 0.7cm) recently diagnosed ER and AK positive and HER2 negative. SNLB on right was negative (0/1). Left negative for malignancy.\par \par 3/23/21-17/20 fx - fatigue, pinkness of treated site, intermittent twinges relieved with tylenol. Skin care as directed\par \par 3/16/21-12/20 fx -feeling much better after neurology appointment. Denies breast pain. Sleeping better with new medication ordered by neuro. Will f/u with psych.\par \par 3/9/21-8/20 fx still with anxiety, poor sleep of about 1hr per night only fatigue, no skin reaction. Appt. with Dr. Goode-Luis 3/17/21\par \par 3/2/21-3/20 fx- Fatigue, anxiety, no skin reaction yet, requesting to speak with SW about anxiety. We discussed wellness and skincare during radiation

## 2021-03-23 NOTE — DISEASE MANAGEMENT
[Pathological] : TNM Stage: p [IA] : IA [TTNM] : 1 [NTNM] : 0 [MTNM] : 0 [de-identified] : right breast

## 2021-03-26 PROCEDURE — 77427 RADIATION TX MANAGEMENT X5: CPT

## 2021-03-28 ENCOUNTER — EMERGENCY (EMERGENCY)
Facility: HOSPITAL | Age: 61
LOS: 1 days | Discharge: HOME | End: 2021-03-28
Attending: EMERGENCY MEDICINE | Admitting: INTERNAL MEDICINE
Payer: MEDICAID

## 2021-03-28 VITALS
HEART RATE: 91 BPM | RESPIRATION RATE: 16 BRPM | TEMPERATURE: 97 F | DIASTOLIC BLOOD PRESSURE: 79 MMHG | OXYGEN SATURATION: 99 % | SYSTOLIC BLOOD PRESSURE: 109 MMHG | WEIGHT: 111.99 LBS | HEIGHT: 60 IN

## 2021-03-28 DIAGNOSIS — D35.2 BENIGN NEOPLASM OF PITUITARY GLAND: Chronic | ICD-10-CM

## 2021-03-28 LAB
ALBUMIN SERPL ELPH-MCNC: 3.6 G/DL — SIGNIFICANT CHANGE UP (ref 3.3–5)
ALP SERPL-CCNC: 73 U/L — SIGNIFICANT CHANGE UP (ref 40–120)
ALT FLD-CCNC: 45 U/L — SIGNIFICANT CHANGE UP (ref 10–45)
ANION GAP SERPL CALC-SCNC: 4 MMOL/L — LOW (ref 5–17)
APTT BLD: 30.6 SEC — SIGNIFICANT CHANGE UP (ref 27.5–35.5)
AST SERPL-CCNC: 26 U/L — SIGNIFICANT CHANGE UP (ref 10–40)
BASOPHILS # BLD AUTO: 0.01 K/UL — SIGNIFICANT CHANGE UP (ref 0–0.2)
BASOPHILS NFR BLD AUTO: 0.2 % — SIGNIFICANT CHANGE UP (ref 0–2)
BILIRUB SERPL-MCNC: 0.4 MG/DL — SIGNIFICANT CHANGE UP (ref 0.2–1.2)
BUN SERPL-MCNC: 14 MG/DL — SIGNIFICANT CHANGE UP (ref 7–23)
CALCIUM SERPL-MCNC: 9 MG/DL — SIGNIFICANT CHANGE UP (ref 8.4–10.5)
CHLORIDE SERPL-SCNC: 105 MMOL/L — SIGNIFICANT CHANGE UP (ref 96–108)
CO2 SERPL-SCNC: 32 MMOL/L — HIGH (ref 22–31)
CREAT SERPL-MCNC: 0.68 MG/DL — SIGNIFICANT CHANGE UP (ref 0.5–1.3)
EOSINOPHIL # BLD AUTO: 0.02 K/UL — SIGNIFICANT CHANGE UP (ref 0–0.5)
EOSINOPHIL NFR BLD AUTO: 0.5 % — SIGNIFICANT CHANGE UP (ref 0–6)
GLUCOSE SERPL-MCNC: 150 MG/DL — HIGH (ref 70–99)
HCT VFR BLD CALC: 43.5 % — SIGNIFICANT CHANGE UP (ref 34.5–45)
HGB BLD-MCNC: 14.4 G/DL — SIGNIFICANT CHANGE UP (ref 11.5–15.5)
IMM GRANULOCYTES NFR BLD AUTO: 0.2 % — SIGNIFICANT CHANGE UP (ref 0–1.5)
INR BLD: 0.97 RATIO — SIGNIFICANT CHANGE UP (ref 0.88–1.16)
LYMPHOCYTES # BLD AUTO: 0.8 K/UL — LOW (ref 1–3.3)
LYMPHOCYTES # BLD AUTO: 18.1 % — SIGNIFICANT CHANGE UP (ref 13–44)
MCHC RBC-ENTMCNC: 30.3 PG — SIGNIFICANT CHANGE UP (ref 27–34)
MCHC RBC-ENTMCNC: 33.1 GM/DL — SIGNIFICANT CHANGE UP (ref 32–36)
MCV RBC AUTO: 91.4 FL — SIGNIFICANT CHANGE UP (ref 80–100)
MONOCYTES # BLD AUTO: 0.27 K/UL — SIGNIFICANT CHANGE UP (ref 0–0.9)
MONOCYTES NFR BLD AUTO: 6.1 % — SIGNIFICANT CHANGE UP (ref 2–14)
NEUTROPHILS # BLD AUTO: 3.31 K/UL — SIGNIFICANT CHANGE UP (ref 1.8–7.4)
NEUTROPHILS NFR BLD AUTO: 74.9 % — SIGNIFICANT CHANGE UP (ref 43–77)
NRBC # BLD: 0 /100 WBCS — SIGNIFICANT CHANGE UP (ref 0–0)
PLATELET # BLD AUTO: 269 K/UL — SIGNIFICANT CHANGE UP (ref 150–400)
POTASSIUM SERPL-MCNC: 4 MMOL/L — SIGNIFICANT CHANGE UP (ref 3.5–5.3)
POTASSIUM SERPL-SCNC: 4 MMOL/L — SIGNIFICANT CHANGE UP (ref 3.5–5.3)
PROT SERPL-MCNC: 7.1 G/DL — SIGNIFICANT CHANGE UP (ref 6–8.3)
PROTHROM AB SERPL-ACNC: 11.8 SEC — SIGNIFICANT CHANGE UP (ref 10.6–13.6)
RBC # BLD: 4.76 M/UL — SIGNIFICANT CHANGE UP (ref 3.8–5.2)
RBC # FLD: 12.6 % — SIGNIFICANT CHANGE UP (ref 10.3–14.5)
SARS-COV-2 RNA SPEC QL NAA+PROBE: SIGNIFICANT CHANGE UP
SODIUM SERPL-SCNC: 141 MMOL/L — SIGNIFICANT CHANGE UP (ref 135–145)
TROPONIN I SERPL-MCNC: <.017 NG/ML — LOW (ref 0.02–0.06)
WBC # BLD: 4.42 K/UL — SIGNIFICANT CHANGE UP (ref 3.8–10.5)
WBC # FLD AUTO: 4.42 K/UL — SIGNIFICANT CHANGE UP (ref 3.8–10.5)

## 2021-03-28 PROCEDURE — 99284 EMERGENCY DEPT VISIT MOD MDM: CPT

## 2021-03-28 PROCEDURE — 99220: CPT

## 2021-03-28 PROCEDURE — 93306 TTE W/DOPPLER COMPLETE: CPT | Mod: 26

## 2021-03-28 PROCEDURE — 93010 ELECTROCARDIOGRAM REPORT: CPT | Mod: 76

## 2021-03-28 PROCEDURE — 99285 EMERGENCY DEPT VISIT HI MDM: CPT

## 2021-03-28 PROCEDURE — 71275 CT ANGIOGRAPHY CHEST: CPT | Mod: 26,MA

## 2021-03-28 RX ORDER — ATORVASTATIN CALCIUM 80 MG/1
40 TABLET, FILM COATED ORAL AT BEDTIME
Refills: 0 | Status: DISCONTINUED | OUTPATIENT
Start: 2021-03-28 | End: 2021-03-31

## 2021-03-28 RX ORDER — NITROFURANTOIN MACROCRYSTAL 50 MG
1 CAPSULE ORAL
Qty: 0 | Refills: 0 | DISCHARGE

## 2021-03-28 RX ORDER — OMEPRAZOLE 10 MG/1
1 CAPSULE, DELAYED RELEASE ORAL
Qty: 0 | Refills: 0 | DISCHARGE

## 2021-03-28 RX ORDER — TRAZODONE HCL 50 MG
0 TABLET ORAL
Qty: 0 | Refills: 0 | DISCHARGE

## 2021-03-28 RX ORDER — ASPIRIN/CALCIUM CARB/MAGNESIUM 324 MG
81 TABLET ORAL DAILY
Refills: 0 | Status: DISCONTINUED | OUTPATIENT
Start: 2021-03-28 | End: 2021-03-31

## 2021-03-28 RX ORDER — ACETAMINOPHEN 500 MG
650 TABLET ORAL EVERY 6 HOURS
Refills: 0 | Status: DISCONTINUED | OUTPATIENT
Start: 2021-03-28 | End: 2021-03-31

## 2021-03-28 RX ORDER — ENOXAPARIN SODIUM 100 MG/ML
40 INJECTION SUBCUTANEOUS DAILY
Refills: 0 | Status: DISCONTINUED | OUTPATIENT
Start: 2021-03-28 | End: 2021-03-31

## 2021-03-28 RX ORDER — ACETAMINOPHEN 500 MG
650 TABLET ORAL ONCE
Refills: 0 | Status: COMPLETED | OUTPATIENT
Start: 2021-03-28 | End: 2021-03-28

## 2021-03-28 RX ORDER — LIDOCAINE 4 G/100G
1 CREAM TOPICAL ONCE
Refills: 0 | Status: DISCONTINUED | OUTPATIENT
Start: 2021-03-28 | End: 2021-03-28

## 2021-03-28 RX ORDER — ASPIRIN/CALCIUM CARB/MAGNESIUM 324 MG
162 TABLET ORAL ONCE
Refills: 0 | Status: COMPLETED | OUTPATIENT
Start: 2021-03-28 | End: 2021-03-28

## 2021-03-28 RX ADMIN — Medication 650 MILLIGRAM(S): at 21:08

## 2021-03-28 RX ADMIN — Medication 650 MILLIGRAM(S): at 22:37

## 2021-03-28 RX ADMIN — Medication 650 MILLIGRAM(S): at 15:19

## 2021-03-28 RX ADMIN — Medication 650 MILLIGRAM(S): at 14:31

## 2021-03-28 RX ADMIN — Medication 162 MILLIGRAM(S): at 11:55

## 2021-03-28 RX ADMIN — ATORVASTATIN CALCIUM 40 MILLIGRAM(S): 80 TABLET, FILM COATED ORAL at 21:08

## 2021-03-28 NOTE — H&P ADULT - NSHPSOCIALHISTORY_GEN_ALL_CORE
Social History:    Marital Status: (x  ) , (  ) Single, (  ) , (  ) , (  )   # of Children:   Lives with: (  ) alone, (  ) children, ( x ) spouse, (  ) parents, (  ) siblings, (  ) friends, (  ) other:   Occupation:     Substance Use/Illicit Drugs: ( x ) never used vs other:   Tobacco Usage: (  x) never smoked, (  ) former smoker, (  ) current smoker and Total Pack-Years:   Last Alcohol Usage/Frequency/Amount/Withdrawal/Hx of Abuse:  Denies  Foreign travel: Denies  Animal exposure: Denies

## 2021-03-28 NOTE — H&P ADULT - HISTORY OF PRESENT ILLNESS
61 yo F PMH of right breast cancer, s/p XRT last dose three days ago, s/p right breast mastectomy, hypothyroidism (not on medications), diverticulosis presenting to Er with cc of left sided chest pain.  PT states that her pain started last night.  She was sitting when she started to experience intermittent chest pain under her left breast associated with breathing, nonradiating, pressure like.  Laying down would make it worse.  She thought it would go away but it has not.  She decided to come to the ER for evaluation due to the persistent nature of her chest pain.    She denies having fever, cough, runny nose, travel hx, sick contacts.  She has never had this type of pain before.    In the ER, she was given ASA 162mg and lidocaine.

## 2021-03-28 NOTE — CONSULT NOTE ADULT - SUBJECTIVE AND OBJECTIVE BOX
MRN-[id number]    CHIEF COMPLAINT:  Patient is a 60y old  Female who presents with a chief complaint of Chest pain (28 Mar 2021 16:01)      HISTORY OF PRESENT ILLNESS:  KATHLEEN WILKINSON is a 60y Female patient with past medical history of *** presenting with ***.     Allergies    morphine (Flushing; Rash)  OxyContin (Unknown)    Intolerances    	    PAST MEDICAL & SURGICAL HISTORY:  Breast cancer  right dx 2020    Right upper quadrant abdominal pain    Diverticulitis    Hypothyroid  no meds    Benign tumor of pituitary gland  removed        FAMILY HISTORY:  FH: hypothyroidism        SOCIAL HISTORY:    [ ] Non-smoker  [ ] Smoker  [ ] Alcohol    REVIEW OF SYSTEMS:  CONSTITUTIONAL: No fever, weight loss, or fatigue  EYES: No eye pain, visual disturbances, or discharge  ENMT:  No difficulty hearing, tinnitus, vertigo; No sinus or throat pain  NECK: No pain or stiffness  RESPIRATORY: No cough, wheezing, chills or hemoptysis; No Shortness of Breath  CARDIOVASCULAR: No chest pain, palpitations, passing out, dizziness, or leg swelling  GASTROINTESTINAL: No abdominal or epigastric pain. No nausea, vomiting, or hematemesis; No diarrhea or constipation. No melena or hematochezia.  GENITOURINARY: No dysuria, frequency, hematuria, or incontinence  NEUROLOGICAL: No headaches, memory loss, loss of strength, numbness, or tremors  SKIN: No itching, burning, rashes, or lesions   LYMPH Nodes: No enlarged glands  ENDOCRINE: No heat or cold intolerance; No hair loss  MUSCULOSKELETAL: No joint pain or swelling; No muscle, back, or extremity pain  PSYCHIATRIC: No depression, anxiety, mood swings, or difficulty sleeping  HEME/LYMPH: No easy bruising, or bleeding gums  ALLERY AND IMMUNOLOGIC: No hives or eczema	    [ ] All others negative	  [ ] Unable to obtain    I&O's Summary      PHYSICAL EXAM:  Vital Signs Last 24 Hrs  T(C): 36.7 (28 Mar 2021 20:30), Max: 36.7 (28 Mar 2021 17:13)  T(F): 98 (28 Mar 2021 20:30), Max: 98 (28 Mar 2021 17:13)  HR: 75 (28 Mar 2021 20:30) (75 - 91)  BP: 100/64 (28 Mar 2021 20:30) (100/64 - 116/77)  BP(mean): --  RR: 16 (28 Mar 2021 20:30) (16 - 20)  SpO2: 98% (28 Mar 2021 20:30) (96% - 99%)  Appearance: Normal	  HEENT:   Normal oral mucosa, PERRL, EOMI	  Lymphatic: No lymphadenopathy  Cardiovascular: Normal S1 S2, No JVD, No murmurs, No edema  Respiratory: Lungs clear to auscultation	  Psychiatry: A & O x 3, Mood & affect appropriate  Gastrointestinal:  Soft, Non-tender, + BS	  Skin: No rashes, No ecchymoses, No cyanosis	  Neurologic: Non-focal  Extremities: Normal range of motion, No clubbing, cyanosis or edema  Vascular: Peripheral pulses palpable 2+ bilaterally    MEDICATIONS:  MEDICATIONS  (STANDING):  aspirin enteric coated 81 milliGRAM(s) Oral daily  atorvastatin 40 milliGRAM(s) Oral at bedtime  enoxaparin Injectable 40 milliGRAM(s) SubCutaneous daily    MEDICATIONS  (PRN):  acetaminophen   Tablet .. 650 milliGRAM(s) Oral every 6 hours PRN Moderate Pain (4 - 6)      Home Medications:  Vitamin D3 2000 intl units (50 mcg) oral tablet:  (19 Jan 2021 14:03)      LABS:	 	  CBC Full  -  ( 28 Mar 2021 11:45 )  WBC Count : 4.42 K/uL  Hemoglobin : 14.4 g/dL  Hematocrit : 43.5 %  Platelet Count - Automated : 269 K/uL  Mean Cell Volume : 91.4 fl  Mean Cell Hemoglobin : 30.3 pg  Mean Cell Hemoglobin Concentration : 33.1 gm/dL  Auto Neutrophil # : 3.31 K/uL  Auto Lymphocyte # : 0.80 K/uL  Auto Monocyte # : 0.27 K/uL  Auto Eosinophil # : 0.02 K/uL  Auto Basophil # : 0.01 K/uL  Auto Neutrophil % : 74.9 %  Auto Lymphocyte % : 18.1 %  Auto Monocyte % : 6.1 %  Auto Eosinophil % : 0.5 %  Auto Basophil % : 0.2 %    03-28    141  |  105  |  14  ----------------------------<  150<H>  4.0   |  32<H>  |  0.68    Ca    9.0      28 Mar 2021 11:45    TPro  7.1  /  Alb  3.6  /  TBili  0.4  /  DBili  x   /  AST  26  /  ALT  45  /  AlkPhos  73  03-28    PT/INR - ( 28 Mar 2021 11:45 )   PT: 11.8 sec;   INR: 0.97 ratio         PTT - ( 28 Mar 2021 11:45 )  PTT:30.6 sec  CARDIAC MARKERS ( 28 Mar 2021 18:30 )  <.017 ng/mL / x     / x     / x     / x      CARDIAC MARKERS ( 28 Mar 2021 15:15 )  <.017 ng/mL / x     / x     / x     / x      CARDIAC MARKERS ( 28 Mar 2021 11:45 )  <.017 ng/mL / x     / x     / x     / x            proBNP:   Lipid Profile:   HgA1c:   TSH:     TELEMETRY: 	    ECG:  	  RADIOLOGY:  OTHER: 	    CARDIAC TESTING/STUDIES:    [ ] Echocardiogram:  [ ]  Catheterization:  [ ] Stress Test:  	  	  ASSESSMENT/PLAN: 	    Pato Whyte MD, MPH, FINN, RPVI, FACC  Cardiovascular Specialist   [facility] ([facility])  [facility]  c: [phone] (text preferred and/or call)  e: [email or web address]  For all [facility] Cardiology and Cardiovascular Surgery on-service contact/call information, go to [email or web address] and use "[email or web address]" to login.  For outpatient Cardiology appointments, call  [phone] to arrange with a colleague; I do not have outpatient Cardiology clinic.   MRN-[id number]    CHIEF COMPLAINT:  Chest pain (28 Mar 2021 16:01)    HISTORY OF PRESENT ILLNESS:  KATHLEEN WILKINSON is a 60y Female patient with past medical history of right breast cancer s/p XRT last dose three days ago, s/p right breast mastectomy, presenting with chest pain. The chest pain is non-radiating, non-exertional, began while sitting in bed and remained somewhat persistent therefore presented to the ED. Cardiac enzymes negative. ECG non-ischemic. ECHO with preserved EF and no wall motion abnormalities. CT chest with no evidence of PE and no comment on coronary calcification. Cardiac cath 4/2019 with similar presentation with normal coronary arteries. Cardiology consulted for further management.     Allergies  morphine (Flushing; Rash)  OxyContin (Unknown)    PAST MEDICAL & SURGICAL HISTORY:  Breast cancer  right dx 2020  Right upper quadrant abdominal pain  Diverticulitis  Hypothyroid  no meds  Benign tumor of pituitary gland  removed    FAMILY HISTORY:  FH: hypothyroidism    SOCIAL HISTORY:    Non-smoker    REVIEW OF SYSTEMS:  CONSTITUTIONAL: No fever, weight loss, POSITIVE fatigue   EYES: No eye pain, visual disturbances, or discharge  ENMT:  No difficulty hearing, tinnitus, vertigo; No sinus or throat pain  NECK: No pain or stiffness  RESPIRATORY: No cough, wheezing, chills or hemoptysis; No Shortness of Breath  CARDIOVASCULAR: No palpitations, passing out, dizziness, or leg swelling. POSITIVE chest pain   GASTROINTESTINAL: No abdominal or epigastric pain. No nausea, vomiting, or hematemesis; No diarrhea or constipation. No melena or hematochezia.  GENITOURINARY: No dysuria, frequency, hematuria, or incontinence  NEUROLOGICAL: No headaches, memory loss, loss of strength, numbness, or tremors  SKIN: No itching, burning, rashes, or lesions   LYMPH Nodes: No enlarged glands  ENDOCRINE: No heat or cold intolerance  MUSCULOSKELETAL: No muscle, back, or extremity pain  PSYCHIATRIC: No depression, anxiety, mood swings, or difficulty sleeping  HEME/LYMPH: No easy bruising, or bleeding gums  ALLERY AND IMMUNOLOGIC: No hives or eczema    PHYSICAL EXAM:  Vital Signs Last 24 Hrs  T(C): 36.7 (28 Mar 2021 20:30), Max: 36.7 (28 Mar 2021 17:13)  T(F): 98 (28 Mar 2021 20:30), Max: 98 (28 Mar 2021 17:13)  HR: 75 (28 Mar 2021 20:30) (75 - 91)  BP: 100/64 (28 Mar 2021 20:30) (100/64 - 116/77)  RR: 16 (28 Mar 2021 20:30) (16 - 20)  SpO2: 98% (28 Mar 2021 20:30) (96% - 99%)    Appearance: Normal	  HEENT:   Normal oral mucosa  Lymphatic: No lymphadenopathy  Cardiovascular: Normal S1 S2, No JVD  Respiratory: Lungs clear to auscultation	  Psychiatry: A & O x 3  Gastrointestinal:  Soft, Non-tender  Skin: No rashes, No ecchymoses, No cyanosis	  Neurologic: Non-focal  Extremities: Normal range of motion  Vascular: Peripheral pulses palpable 2+ bilaterally    MEDICATIONS:  MEDICATIONS  (STANDING):  aspirin enteric coated 81 milliGRAM(s) Oral daily  atorvastatin 40 milliGRAM(s) Oral at bedtime  enoxaparin Injectable 40 milliGRAM(s) SubCutaneous daily    MEDICATIONS  (PRN):  acetaminophen   Tablet .. 650 milliGRAM(s) Oral every 6 hours PRN Moderate Pain (4 - 6)    Home Medications:  Vitamin D3 2000 intl units (50 mcg) oral tablet:  (19 Jan 2021 14:03)    LABS:	 	  CBC Full  -  ( 28 Mar 2021 11:45 )  WBC Count : 4.42 K/uL  Hemoglobin : 14.4 g/dL  Hematocrit : 43.5 %  Platelet Count - Automated : 269 K/uL  Mean Cell Volume : 91.4 fl  Mean Cell Hemoglobin : 30.3 pg  Mean Cell Hemoglobin Concentration : 33.1 gm/dL  Auto Neutrophil # : 3.31 K/uL  Auto Lymphocyte # : 0.80 K/uL  Auto Monocyte # : 0.27 K/uL  Auto Eosinophil # : 0.02 K/uL  Auto Basophil # : 0.01 K/uL  Auto Neutrophil % : 74.9 %  Auto Lymphocyte % : 18.1 %  Auto Monocyte % : 6.1 %  Auto Eosinophil % : 0.5 %  Auto Basophil % : 0.2 %    03-28    141  |  105  |  14  ----------------------------<  150<H>  4.0   |  32<H>  |  0.68    Ca    9.0      28 Mar 2021 11:45    TPro  7.1  /  Alb  3.6  /  TBili  0.4  /  DBili  x   /  AST  26  /  ALT  45  /  AlkPhos  73  03-28    PT/INR - ( 28 Mar 2021 11:45 )   PT: 11.8 sec;   INR: 0.97 ratio      PTT - ( 28 Mar 2021 11:45 )  PTT:30.6 sec  CARDIAC MARKERS ( 28 Mar 2021 18:30 )  <.017 ng/mL / x     / x     / x     / x      CARDIAC MARKERS ( 28 Mar 2021 15:15 )  <.017 ng/mL / x     / x     / x     / x      CARDIAC MARKERS ( 28 Mar 2021 11:45 )  <.017 ng/mL / x     / x     / x     / x        TELEMETRY: 3/28/2021  NSR     ECG: 3/28/2021  NSR, RAD    CT Chest 3/28/2021  IMPRESSION:  No evidence of pulmonary emboli    CARDIAC TESTING/STUDIES:    ECHO 3/28/2021  Summary:   1. Left ventricular ejection fraction, by visual estimation, is 60 to 65%.   2. Normal global left ventricular systolic function.   3. Spectral Doppler shows impaired relaxation pattern of left ventricular myocardial filling (Grade I diastolic dysfunction).   4. Normal right ventricular size and function.   5. The left atrium is normal in size.   6. The right atrium is normal in size.   7. Mild thickening and calcification of the anterior and posterior mitral valve leaflets.   8. Trace mitral valve regurgitation.   9. Structurally normal tricuspid valve, with normal leaflet excursion.    Cardiac Cath 4/23/2019  PROCEDURE:  --  Left heart catheterization.  --  Left coronary angiography.  --  Right coronary angiography.    CORONARY VESSELS: The coronary circulation is right dominant.  LM:   --  LM: Normal.  LAD:   --  LAD: Normal.  CX:   --  Circumflex: Normal.  RCA:   --  RCA: Normal.    COMPLICATIONS: There were no complications.    DIAGNOSTIC RECOMMENDATIONS: The patient should continue with the present  medications.    ASSESSMENT/PLAN: 	  60y Female patient with past medical history of right breast cancer s/p XRT last dose three days ago, s/p right breast mastectomy, presenting with atypical chest pain.     1) Atypical chest pain   Cardiac enzymes negative   ECG non-ischemic   Atypical features; non-exertional.   ECHO unremarkable EF 60-65%, no WMA   Cath 4/2019 normal  HEART Score 1; low with 0.9-1.7% 30-day MACE  Chest pain possibly secondary to chest radiation, neuropathic pain, musculoskeletal, pleuritis, less likely CAD/ischemia. other.    ·	Discontinue aspirin 81 mg daily and atorvastatin 40 mg nightly given completely normal cath and low likelihood of cardiac etiology.   ·	Pain control, suggest NSAIDs for anti-inflammatory benefit.     Pato Whyte MD, MPH, FINN, RPVI, [facility]  Cardiovascular Specialist   [facility] ([facility])  [facility]  c: [phone] (text preferred and/or call)  e: [email or web address]  For all [facility] Cardiology and Cardiovascular Surgery on-service contact/call information, go to [email or web address] and use "[email or web address]" to login.  For outpatient Cardiology appointments, call  [phone] to arrange with a colleague; I do not have outpatient Cardiology clinic.   MRN-[id number]    CHIEF COMPLAINT:  Chest pain (28 Mar 2021 16:01)    HISTORY OF PRESENT ILLNESS:  KATHLEEN WILKINSON is a 60y Female patient with past medical history of right breast cancer s/p XRT last dose three days ago, s/p right breast mastectomy, presenting with chest pain. The chest pain is non-radiating, non-exertional, began while sitting in bed and remained somewhat persistent therefore presented to the ED. Cardiac enzymes negative. ECG non-ischemic. ECHO with preserved EF and no wall motion abnormalities. CT chest with no evidence of PE and no comment on coronary calcification. Nuclear stress test 2018 with minimal ischemia at the apex. Cardiac cath 4/2019 with similar presentation with normal coronary arteries. Cardiology consulted for further management.     Allergies  morphine (Flushing; Rash)  OxyContin (Unknown)    PAST MEDICAL & SURGICAL HISTORY:  Breast cancer  right dx 2020  Right upper quadrant abdominal pain  Diverticulitis  Hypothyroid  no meds  Benign tumor of pituitary gland  removed    FAMILY HISTORY:  FH: hypothyroidism    SOCIAL HISTORY:    Non-smoker    REVIEW OF SYSTEMS:  CONSTITUTIONAL: No fever, weight loss, POSITIVE fatigue   EYES: No eye pain, visual disturbances, or discharge  ENMT:  No difficulty hearing, tinnitus, vertigo; No sinus or throat pain  NECK: No pain or stiffness  RESPIRATORY: No cough, wheezing, chills or hemoptysis; No Shortness of Breath  CARDIOVASCULAR: No palpitations, passing out, dizziness, or leg swelling. POSITIVE chest pain   GASTROINTESTINAL: No abdominal or epigastric pain. No nausea, vomiting, or hematemesis; No diarrhea or constipation. No melena or hematochezia.  GENITOURINARY: No dysuria, frequency, hematuria, or incontinence  NEUROLOGICAL: No headaches, memory loss, loss of strength, numbness, or tremors  SKIN: No itching, burning, rashes, or lesions   LYMPH Nodes: No enlarged glands  ENDOCRINE: No heat or cold intolerance  MUSCULOSKELETAL: No muscle, back, or extremity pain  PSYCHIATRIC: No depression, anxiety, mood swings, or difficulty sleeping  HEME/LYMPH: No easy bruising, or bleeding gums  ALLERY AND IMMUNOLOGIC: No hives or eczema    PHYSICAL EXAM:  Vital Signs Last 24 Hrs  T(C): 36.7 (28 Mar 2021 20:30), Max: 36.7 (28 Mar 2021 17:13)  T(F): 98 (28 Mar 2021 20:30), Max: 98 (28 Mar 2021 17:13)  HR: 75 (28 Mar 2021 20:30) (75 - 91)  BP: 100/64 (28 Mar 2021 20:30) (100/64 - 116/77)  RR: 16 (28 Mar 2021 20:30) (16 - 20)  SpO2: 98% (28 Mar 2021 20:30) (96% - 99%)    Appearance: Normal	  HEENT:   Normal oral mucosa  Lymphatic: No lymphadenopathy  Cardiovascular: Normal S1 S2, No JVD  Respiratory: Lungs clear to auscultation	  Psychiatry: A & O x 3  Gastrointestinal:  Soft, Non-tender  Skin: No rashes, No ecchymoses, No cyanosis	  Neurologic: Non-focal  Extremities: Normal range of motion  Vascular: Peripheral pulses palpable 2+ bilaterally    MEDICATIONS:  MEDICATIONS  (STANDING):  aspirin enteric coated 81 milliGRAM(s) Oral daily  atorvastatin 40 milliGRAM(s) Oral at bedtime  enoxaparin Injectable 40 milliGRAM(s) SubCutaneous daily    MEDICATIONS  (PRN):  acetaminophen   Tablet .. 650 milliGRAM(s) Oral every 6 hours PRN Moderate Pain (4 - 6)    Home Medications:  Vitamin D3 2000 intl units (50 mcg) oral tablet:  (19 Jan 2021 14:03)    LABS:	 	  CBC Full  -  ( 28 Mar 2021 11:45 )  WBC Count : 4.42 K/uL  Hemoglobin : 14.4 g/dL  Hematocrit : 43.5 %  Platelet Count - Automated : 269 K/uL  Mean Cell Volume : 91.4 fl  Mean Cell Hemoglobin : 30.3 pg  Mean Cell Hemoglobin Concentration : 33.1 gm/dL  Auto Neutrophil # : 3.31 K/uL  Auto Lymphocyte # : 0.80 K/uL  Auto Monocyte # : 0.27 K/uL  Auto Eosinophil # : 0.02 K/uL  Auto Basophil # : 0.01 K/uL  Auto Neutrophil % : 74.9 %  Auto Lymphocyte % : 18.1 %  Auto Monocyte % : 6.1 %  Auto Eosinophil % : 0.5 %  Auto Basophil % : 0.2 %    03-28    141  |  105  |  14  ----------------------------<  150<H>  4.0   |  32<H>  |  0.68    Ca    9.0      28 Mar 2021 11:45    TPro  7.1  /  Alb  3.6  /  TBili  0.4  /  DBili  x   /  AST  26  /  ALT  45  /  AlkPhos  73  03-28    PT/INR - ( 28 Mar 2021 11:45 )   PT: 11.8 sec;   INR: 0.97 ratio      PTT - ( 28 Mar 2021 11:45 )  PTT:30.6 sec  CARDIAC MARKERS ( 28 Mar 2021 18:30 )  <.017 ng/mL / x     / x     / x     / x      CARDIAC MARKERS ( 28 Mar 2021 15:15 )  <.017 ng/mL / x     / x     / x     / x      CARDIAC MARKERS ( 28 Mar 2021 11:45 )  <.017 ng/mL / x     / x     / x     / x        TELEMETRY: 3/28/2021  NSR     ECG: 3/28/2021  NSR, RAD    CT Chest 3/28/2021  IMPRESSION:  No evidence of pulmonary emboli    CARDIAC TESTING/STUDIES:    ECHO 3/28/2021  Summary:   1. Left ventricular ejection fraction, by visual estimation, is 60 to 65%.   2. Normal global left ventricular systolic function.   3. Spectral Doppler shows impaired relaxation pattern of left ventricular myocardial filling (Grade I diastolic dysfunction).   4. Normal right ventricular size and function.   5. The left atrium is normal in size.   6. The right atrium is normal in size.   7. Mild thickening and calcification of the anterior and posterior mitral valve leaflets.   8. Trace mitral valve regurgitation.   9. Structurally normal tricuspid valve, with normal leaflet excursion.    Cardiac Cath 4/23/2019  PROCEDURE:  --  Left heart catheterization.  --  Left coronary angiography.  --  Right coronary angiography.    CORONARY VESSELS: The coronary circulation is right dominant.  LM:   --  LM: Normal.  LAD:   --  LAD: Normal.  CX:   --  Circumflex: Normal.  RCA:   --  RCA: Normal.    COMPLICATIONS: There were no complications.    DIAGNOSTIC RECOMMENDATIONS: The patient should continue with the present  medications.    Nuclear Stress Test 12/28/2018  IMPRESSIONS: Mildly Abnormal Study  * Exercise capacity: 10 METS, Excellent for age and  gender.  * Chest Pain: No chest pain with exercise.  * Symptom: Fatigue.  * HR Response: Appropriate.  * BP Response: Appropriate.  * Heart Rhythm: Sinus Rhythm - 74 BPM.  * Baseline ECG: Nonspecific ST-T wave abnormality.  * ECG Changes: No significant ischemic ST segment changes  beyond baseline abnormalities.  * Arrhythmia: None.  * The left ventricle was small in size. There is a small,  mild defect in the apex that is mostly fixed suggestive of  scar with minimal ischemia, primarily in the apical  lateral segment.  * Post-stress gated wall motion analysis was performed  (LVEF > 70%;LVEDV = 45 ml.) revealing mildly reduced  systolic thickening of the apex with normal overall left  ventricular ejection fraction.  *** No previous Nuclear/Stress exam.    ASSESSMENT/PLAN: 	  60y Female patient with past medical history of right breast cancer s/p XRT last dose three days ago, s/p right breast mastectomy, presenting with atypical chest pain.     1) Atypical chest pain   Cardiac enzymes negative   ECG non-ischemic   Atypical features; non-exertional.   ECHO unremarkable EF 60-65%, no WMA   Cath 4/2019 normal  HEART Score 1; low with 0.9-1.7% 30-day MACE  Chest pain possibly secondary to chest radiation, neuropathic pain, musculoskeletal, pleuritis, less likely CAD/ischemia. other.    ·	Discontinue aspirin 81 mg daily and atorvastatin 40 mg nightly given completely normal cath and low likelihood of cardiac etiology.   ·	Pain control, suggest NSAIDs for anti-inflammatory benefit.   ·	Acceptable for discharge with outpatient follow up.     Pato Whyte MD, MPH, FINN, RPVI, FACC  Cardiovascular Specialist   [facility] ([facility])  [facility]  c: [phone] (text preferred and/or call)  e: [email or web address]  For all [facility] Cardiology and Cardiovascular Surgery on-service contact/call information, go to [email or web address] and use "[email or web address]" to login.  For outpatient Cardiology appointments, call  [phone] to arrange with a colleague; I do not have outpatient Cardiology clinic.

## 2021-03-28 NOTE — H&P ADULT - NSHPPHYSICALEXAM_GEN_ALL_CORE
Vital Signs Last 24 Hrs  T(F): 97.3 (28 Mar 2021 11:09), Max: 97.3 (28 Mar 2021 11:09)  HR: 85 (28 Mar 2021 14:32) (85 - 91)  BP: 116/77 (28 Mar 2021 14:32) (103/65 - 116/77)  RR: 18 (28 Mar 2021 14:32) (16 - 20)  SpO2: 99% (28 Mar 2021 14:32) (96% - 99%)    PHYSICAL EXAM:  GENERAL: NAD, AAox 3, Latvian speaking, well-groomed, well-developed  HEAD:  Atraumatic, Normocephalic  EYES: EOMI, conjunctiva and sclera clear  ENMT: Moist mucous membranes, Good dentition, no thrush  NECK: Supple, No JVD  CHEST/LUNG: Clear to auscultation bilaterally, good air entry, non-labored breathing; left lateral chest wall tender to palpation  HEART: RRR; S1/S2, No murmur  ABDOMEN: Soft, Nontender, Nondistended; Bowel sounds present  VASCULAR: Normal pulses, Normal capillary refill  EXTREMITIES: No calf tenderness, No cyanosis, No edema  LYMPH: Normal; No lymphadenopathy noted  SKIN: Warm, Intact  PSYCH: Normal mood, Normal affect  NERVOUS SYSTEM:  A/O x3, Good concentration; CN 2-12 intact, No focal deficits

## 2021-03-28 NOTE — H&P ADULT - NSHPLABSRESULTS_GEN_ALL_CORE
14.4   4.42  )-----------( 269      ( 28 Mar 2021 11:45 )             43.5       03-28    141  |  105  |  14  ----------------------------<  150  4.0   |  32  |  0.68    Ca    9.0      28 Mar 2021 11:45    TPro  7.1  /  Alb  3.6  /  TBili  0.4  /  DBili  x   /  AST  26  /  ALT  45  /  AlkPhos  73  03-28    PT/INR - ( 28 Mar 2021 11:45 )   PT: 11.8 sec;   INR: 0.97 ratio         PTT - ( 28 Mar 2021 11:45 )  PTT:30.6 sec  CARDIAC MARKERS ( 28 Mar 2021 15:15 )  <.017 ng/mL / x     / x     / x     / x      CARDIAC MARKERS ( 28 Mar 2021 11:45 )  <.017 ng/mL / x     / x     / x     / x        COVID-19 PCR: NotDetec (03-28-21 @ 11:45)  COVID-19 PCR: NotDetec (28 Mar 2021 11:45)    RADIOLOGY:  IMPRESSION:  No evidence of pulmonary emboli      Consultant(s) Notes Reveiwed [x ] Yes   Care Discussed with [x ] Consultants  [ x] Patient  [ ] Family  [ ] /   [ x] Other; RN

## 2021-03-28 NOTE — ED PROVIDER NOTE - ATTENDING CONTRIBUTION TO CARE
Dr. Xavier: I performed a face to face bedside interview with patient regarding history of present illness, review of symptoms and past medical history. I completed an independent physical exam.  I have discussed patient's plan of care with PA.   I agree with note as stated above, having amended the EMR as needed to reflect my findings.   This includes HISTORY OF PRESENT ILLNESS, HIV, PAST MEDICAL/SURGICAL/FAMILY/SOCIAL HISTORY, ALLERGIES AND HOME MEDICATIONS, REVIEW OF SYSTEMS, PHYSICAL EXAM, and any PROGRESS NOTES during the time I functioned as the attending physician for this patient.    Dr. Xavier: This H&P has been written by myself in its entirety

## 2021-03-28 NOTE — ED ADULT NURSE REASSESSMENT NOTE - NS ED NURSE REASSESS COMMENT FT1
Pt sitting up on stretcher eating meal tray. States her pain has improved to 2/10 now with inspiration. Awaiting tele bed. VSS.

## 2021-03-28 NOTE — ED ADULT NURSE REASSESSMENT NOTE - CONDITION
Pt sitting up on stretcher eating meal tray. States her pain has improved to 2/10 now with inspiration./improved improved

## 2021-03-28 NOTE — ED ADULT NURSE NOTE - OBJECTIVE STATEMENT
Pt brought in by  today for chest pain under her left breast. She states it's worse with inspiration and when she's laying down and that's it's constant. She completed radiation treatment for breast cancer on Friday. States it's ok to use either arm for an IV.

## 2021-03-28 NOTE — ED ADULT NURSE NOTE - CHIEF COMPLAINT
Shakir Jones is a 46year old female presenting for post op brow/bleph (10/29/20). Patient reports still having the numbness to forehead, has some mild swelling to bilateral eyelid/forehead. Left upper scalp incision healed over. HISTORY REVIEW:   Patient denies medication changes. Allergies reviewed and updated. Denies known Latex allergy. Patient's PCP was verified at today's visit.
The patient is a 60y Female complaining of chest pain.

## 2021-03-28 NOTE — H&P ADULT - NSHPREVIEWOFSYSTEMS_GEN_ALL_CORE
REVIEW OF SYSTEMS:  CONSTITUTIONAL: No fever, weight loss, or fatigue  EYES: No eye pain, visual disturbances, or discharge  ENMT:  No difficulty hearing, tinnitus, vertigo; No sinus or throat pain  NECK: No pain or stiffness  BREASTS: No pain, masses, or nipple discharge  RESPIRATORY: No cough, wheezing, chills or hemoptysis; No shortness of breath  CARDIOVASCULAR: ++chest pain, no palpitations, dizziness, or leg swelling  GASTROINTESTINAL: No abdominal or epigastric pain. No nausea, vomiting, or hematemesis; No diarrhea or constipation. No melena or hematochezia.  GENITOURINARY: No dysuria, frequency, hematuria, or incontinence  NEUROLOGICAL: No headaches, memory loss, loss of strength, numbness, or tremors  SKIN: No itching, burning, rashes, or lesions   LYMPH NODES: No enlarged glands  ENDOCRINE: No heat or cold intolerance; No hair loss  MUSCULOSKELETAL: No joint pain or swelling; No muscle, back, or extremity pain  PSYCHIATRIC: No depression, anxiety, mood swings, or difficulty sleeping  HEME/LYMPH: No easy bruising, or bleeding gums  ALLERY AND IMMUNOLOGIC: No hives or eczema    ALL ROS REVIEWED AND NORMAL EXCEPT AS STATED ABOVE

## 2021-03-28 NOTE — H&P ADULT - ASSESSMENT
59 yo F PMH of right breast cancer, s/p XRT last dose three days ago, s/p right breast mastectomy, hypothyroidism (not on medications), diverticulosis presenting to Er with cc of left sided chest pain.     *Left sided chest pain, r/o ACS  *Hx of right breast cancer, s/p mastectomy and XRT  *Hx of hypothyroidism, on no medications  *Hx of diverticulitis    Will place in observation under telemetry  CONSULT: Cardiology, Dr Whyte  CE x 3, ECG with each troponin, TTE ordered  Will check A1C, lipid panel  Will order ASA 81mg daily, lipitor 40mg qhs  CTA negative for PE, pneumonic process  COVID negative    DVT PPX, AM labs  Full code    Spoke with HCP  Markel at bedside, answered all questions, in agreement with care plan as above    CAPRINI SCORE [CLOT]    AGE RELATED RISK FACTORS                                                       MOBILITY RELATED FACTORS  [ ] Age 41-60 years                                            (1 Point)                  [ ] Bed rest                                                        (1 Point)  [x ] Age: 61-74 years                                           (2 Points)                 [ ] Plaster cast                                                   (2 Points)  [ ] Age= 75 years                                              (3 Points)                 [ ] Bed bound for more than 72 hours                 (2 Points)    DISEASE RELATED RISK FACTORS                                               GENDER SPECIFIC FACTORS  [ ] Edema in the lower extremities                       (1 Point)                  [ ] Pregnancy                                                     (1 Point)  [ ] Varicose veins                                               (1 Point)                  [ ] Post-partum < 6 weeks                                   (1 Point)             [ ] BMI > 25 Kg/m2                                            (1 Point)                  [ ] Hormonal therapy  or oral contraception          (1 Point)                 [ ] Sepsis (in the previous month)                        (1 Point)                  [ ] History of pregnancy complications                 (1 point)  [ ] Pneumonia or serious lung disease                                               [ ] Unexplained or recurrent                     (1 Point)           (in the previous month)                               (1 Point)  [ ] Abnormal pulmonary function test                     (1 Point)                 SURGERY RELATED RISK FACTORS  [ ] Acute myocardial infarction                              (1 Point)                 [ ]  Section                                             (1 Point)  [ ] Congestive heart failure (in the previous month)  (1 Point)               [ ] Minor surgery                                                  (1 Point)   [ ] Inflammatory bowel disease                             (1 Point)                 [ ] Arthroscopic surgery                                        (2 Points)  [ ] Central venous access                                      (2 Points)                [ ] General surgery lasting more than 45 minutes   (2 Points)       [ ] Stroke (in the previous month)                          (5 Points)               [ ] Elective arthroplasty                                         (5 Points)                                                                                                                                               HEMATOLOGY RELATED FACTORS                                                 TRAUMA RELATED RISK FACTORS  [ ] Prior episodes of VTE                                     (3 Points)                [ ] Fracture of the hip, pelvis, or leg                       (5 Points)  [ ] Positive family history for VTE                         (3 Points)                 [ ] Acute spinal cord injury (in the previous month)  (5 Points)  [ ] Prothrombin 15141 A                                     (3 Points)                 [ ] Paralysis  (less than 1 month)                             (5 Points)  [ ] Factor V Leiden                                             (3 Points)                  [ ] Multiple Trauma within 1 month                        (5 Points)  [ ] Lupus anticoagulants                                     (3 Points)                                                           [ ] Anticardiolipin antibodies                               (3 Points)                                                       [ ] High homocysteine in the blood                      (3 Points)                                             [ ] Other congenital or acquired thrombophilia      (3 Points)                                                [ ] Heparin induced thrombocytopenia                  (3 Points)                                          Total Score [     1     ]    Caprini Score 0 - 2:  Low Risk, No VTE Prophylaxis required for most patients, encourage ambulation  Caprini Score 3 - 6:  At Risk, pharmacologic VTE prophylaxis is indicated for most patients (in the absence of a contraindication)  Caprini Score Greater than or = 7:  High Risk, pharmacologic VTE prophylaxis is indicated for most patients (in the absence of a contraindication)

## 2021-03-28 NOTE — ED PROVIDER NOTE - OBJECTIVE STATEMENT
Dr. Xaveir: 60F h/o right breast cancer s/p mastectomy in the past, s/p XRT (finished 2 days ago), p/w 1 day of left sided pleuritic chest pain, non radiating, +Sob, mildly exertional. No fevers, chills, cough, nausea, vomiting, abdo pain.

## 2021-03-29 ENCOUNTER — TRANSCRIPTION ENCOUNTER (OUTPATIENT)
Age: 61
End: 2021-03-29

## 2021-03-29 ENCOUNTER — NON-APPOINTMENT (OUTPATIENT)
Age: 61
End: 2021-03-29

## 2021-03-29 VITALS
RESPIRATION RATE: 16 BRPM | HEART RATE: 84 BPM | OXYGEN SATURATION: 99 % | SYSTOLIC BLOOD PRESSURE: 92 MMHG | DIASTOLIC BLOOD PRESSURE: 64 MMHG | TEMPERATURE: 98 F

## 2021-03-29 LAB
A1C WITH ESTIMATED AVERAGE GLUCOSE RESULT: 5.2 % — SIGNIFICANT CHANGE UP (ref 4–5.6)
ALBUMIN SERPL ELPH-MCNC: 3.6 G/DL — SIGNIFICANT CHANGE UP (ref 3.3–5)
ALP SERPL-CCNC: 74 U/L — SIGNIFICANT CHANGE UP (ref 40–120)
ALT FLD-CCNC: 49 U/L — HIGH (ref 10–45)
ANION GAP SERPL CALC-SCNC: 7 MMOL/L — SIGNIFICANT CHANGE UP (ref 5–17)
AST SERPL-CCNC: 33 U/L — SIGNIFICANT CHANGE UP (ref 10–40)
BASOPHILS # BLD AUTO: 0.02 K/UL — SIGNIFICANT CHANGE UP (ref 0–0.2)
BASOPHILS NFR BLD AUTO: 0.5 % — SIGNIFICANT CHANGE UP (ref 0–2)
BILIRUB SERPL-MCNC: 0.5 MG/DL — SIGNIFICANT CHANGE UP (ref 0.2–1.2)
BUN SERPL-MCNC: 14 MG/DL — SIGNIFICANT CHANGE UP (ref 7–23)
CALCIUM SERPL-MCNC: 9.4 MG/DL — SIGNIFICANT CHANGE UP (ref 8.4–10.5)
CHLORIDE SERPL-SCNC: 105 MMOL/L — SIGNIFICANT CHANGE UP (ref 96–108)
CHOLEST SERPL-MCNC: 201 MG/DL — HIGH
CO2 SERPL-SCNC: 29 MMOL/L — SIGNIFICANT CHANGE UP (ref 22–31)
CREAT SERPL-MCNC: 0.59 MG/DL — SIGNIFICANT CHANGE UP (ref 0.5–1.3)
EOSINOPHIL # BLD AUTO: 0.09 K/UL — SIGNIFICANT CHANGE UP (ref 0–0.5)
EOSINOPHIL NFR BLD AUTO: 2.2 % — SIGNIFICANT CHANGE UP (ref 0–6)
ESTIMATED AVERAGE GLUCOSE: 103 MG/DL — SIGNIFICANT CHANGE UP (ref 68–114)
GLUCOSE SERPL-MCNC: 81 MG/DL — SIGNIFICANT CHANGE UP (ref 70–99)
HCT VFR BLD CALC: 43.5 % — SIGNIFICANT CHANGE UP (ref 34.5–45)
HDLC SERPL-MCNC: 36 MG/DL — LOW
HGB BLD-MCNC: 14.7 G/DL — SIGNIFICANT CHANGE UP (ref 11.5–15.5)
IMM GRANULOCYTES NFR BLD AUTO: 0.5 % — SIGNIFICANT CHANGE UP (ref 0–1.5)
LIPID PNL WITH DIRECT LDL SERPL: 100 MG/DL — HIGH
LYMPHOCYTES # BLD AUTO: 1.19 K/UL — SIGNIFICANT CHANGE UP (ref 1–3.3)
LYMPHOCYTES # BLD AUTO: 29.7 % — SIGNIFICANT CHANGE UP (ref 13–44)
MAGNESIUM SERPL-MCNC: 2.4 MG/DL — SIGNIFICANT CHANGE UP (ref 1.6–2.6)
MCHC RBC-ENTMCNC: 30.1 PG — SIGNIFICANT CHANGE UP (ref 27–34)
MCHC RBC-ENTMCNC: 33.8 GM/DL — SIGNIFICANT CHANGE UP (ref 32–36)
MCV RBC AUTO: 89 FL — SIGNIFICANT CHANGE UP (ref 80–100)
MONOCYTES # BLD AUTO: 0.31 K/UL — SIGNIFICANT CHANGE UP (ref 0–0.9)
MONOCYTES NFR BLD AUTO: 7.7 % — SIGNIFICANT CHANGE UP (ref 2–14)
NEUTROPHILS # BLD AUTO: 2.38 K/UL — SIGNIFICANT CHANGE UP (ref 1.8–7.4)
NEUTROPHILS NFR BLD AUTO: 59.4 % — SIGNIFICANT CHANGE UP (ref 43–77)
NON HDL CHOLESTEROL: 165 MG/DL — HIGH
NRBC # BLD: 0 /100 WBCS — SIGNIFICANT CHANGE UP (ref 0–0)
PLATELET # BLD AUTO: 260 K/UL — SIGNIFICANT CHANGE UP (ref 150–400)
POTASSIUM SERPL-MCNC: 3.8 MMOL/L — SIGNIFICANT CHANGE UP (ref 3.5–5.3)
POTASSIUM SERPL-SCNC: 3.8 MMOL/L — SIGNIFICANT CHANGE UP (ref 3.5–5.3)
PROT SERPL-MCNC: 7.1 G/DL — SIGNIFICANT CHANGE UP (ref 6–8.3)
RBC # BLD: 4.89 M/UL — SIGNIFICANT CHANGE UP (ref 3.8–5.2)
RBC # FLD: 12.9 % — SIGNIFICANT CHANGE UP (ref 10.3–14.5)
SODIUM SERPL-SCNC: 141 MMOL/L — SIGNIFICANT CHANGE UP (ref 135–145)
TRIGL SERPL-MCNC: 326 MG/DL — HIGH
TROPONIN I SERPL-MCNC: <.017 NG/ML — LOW (ref 0.02–0.06)
WBC # BLD: 4.01 K/UL — SIGNIFICANT CHANGE UP (ref 3.8–10.5)
WBC # FLD AUTO: 4.01 K/UL — SIGNIFICANT CHANGE UP (ref 3.8–10.5)

## 2021-03-29 PROCEDURE — 85730 THROMBOPLASTIN TIME PARTIAL: CPT

## 2021-03-29 PROCEDURE — 84484 ASSAY OF TROPONIN QUANT: CPT

## 2021-03-29 PROCEDURE — 36000 PLACE NEEDLE IN VEIN: CPT | Mod: XU

## 2021-03-29 PROCEDURE — 80053 COMPREHEN METABOLIC PANEL: CPT

## 2021-03-29 PROCEDURE — 93306 TTE W/DOPPLER COMPLETE: CPT

## 2021-03-29 PROCEDURE — 85025 COMPLETE CBC W/AUTO DIFF WBC: CPT

## 2021-03-29 PROCEDURE — 83036 HEMOGLOBIN GLYCOSYLATED A1C: CPT

## 2021-03-29 PROCEDURE — G0378: CPT

## 2021-03-29 PROCEDURE — 99217: CPT

## 2021-03-29 PROCEDURE — 80061 LIPID PANEL: CPT

## 2021-03-29 PROCEDURE — 83735 ASSAY OF MAGNESIUM: CPT

## 2021-03-29 PROCEDURE — 87635 SARS-COV-2 COVID-19 AMP PRB: CPT

## 2021-03-29 PROCEDURE — 85610 PROTHROMBIN TIME: CPT

## 2021-03-29 PROCEDURE — 71275 CT ANGIOGRAPHY CHEST: CPT

## 2021-03-29 PROCEDURE — 93005 ELECTROCARDIOGRAM TRACING: CPT

## 2021-03-29 PROCEDURE — 99284 EMERGENCY DEPT VISIT MOD MDM: CPT | Mod: 25

## 2021-03-29 PROCEDURE — 36415 COLL VENOUS BLD VENIPUNCTURE: CPT

## 2021-03-29 RX ORDER — ATORVASTATIN CALCIUM 80 MG/1
1 TABLET, FILM COATED ORAL
Qty: 30 | Refills: 0
Start: 2021-03-29 | End: 2021-04-27

## 2021-03-29 RX ADMIN — ENOXAPARIN SODIUM 40 MILLIGRAM(S): 100 INJECTION SUBCUTANEOUS at 13:07

## 2021-03-29 RX ADMIN — Medication 81 MILLIGRAM(S): at 13:07

## 2021-03-29 NOTE — DISCHARGE NOTE PROVIDER - NSDCMRMEDTOKEN_GEN_ALL_CORE_FT
Vitamin D3 2000 intl units (50 mcg) oral tablet:    atorvastatin 20 mg oral tablet: 1 tab(s) orally once a day   Vitamin D3 2000 intl units (50 mcg) oral tablet:

## 2021-03-29 NOTE — PROGRESS NOTE ADULT - SUBJECTIVE AND OBJECTIVE BOX
Patient is a 60y old  Female who presents with a chief complaint of Chest pain (29 Mar 2021 07:41)      Patient seen and examined at bedside. Pt states she is feeling better, reports mild pain under the left breast,  denies overnight events or further complaints including chest pain, shortness of breath, dizziness, nausea, vomiting, diarrhea, fever or chills.      ALLERGIES:  morphine (Flushing; Rash)  OxyContin (Unknown)    MEDICATIONS  (STANDING):  aspirin enteric coated 81 milliGRAM(s) Oral daily  atorvastatin 40 milliGRAM(s) Oral at bedtime  enoxaparin Injectable 40 milliGRAM(s) SubCutaneous daily    MEDICATIONS  (PRN):  acetaminophen   Tablet .. 650 milliGRAM(s) Oral every 6 hours PRN Moderate Pain (4 - 6)    Vital Signs Last 24 Hrs  T(F): 97.9 (29 Mar 2021 10:31), Max: 98 (28 Mar 2021 17:13)  HR: 84 (29 Mar 2021 10:31) (73 - 85)  BP: 92/64 (29 Mar 2021 10:31) (92/64 - 116/77)  RR: 16 (29 Mar 2021 10:31) (16 - 18)  SpO2: 99% (29 Mar 2021 10:31) (98% - 99%)  I&O's Summary    PHYSICAL EXAM:  General: NAD, A/O x 3  ENT: MMM, no oral thrush   Neck: Supple, No JVD  Lungs: Clear to auscultation bilaterally, non labored breathing  Cardio: RRR, S1/S2, No murmurs  Abdomen: Soft, Nontender, Nondistended; Bowel sounds present  Extremities: No calf tenderness, No pitting edema    LABS:                        14.7   4.01  )-----------( 260      ( 29 Mar 2021 07:20 )             43.5     03-29    141  |  105  |  14  ----------------------------<  81  3.8   |  29  |  0.59    Ca    9.4      29 Mar 2021 07:20  Mg     2.4     03-29    TPro  7.1  /  Alb  3.6  /  TBili  0.5  /  DBili  x   /  AST  33  /  ALT  49  /  AlkPhos  74  03-29    eGFR if Non African American: 98 mL/min/1.73M2 (03-29-21 @ 07:20)  eGFR if African American: 114 mL/min/1.73M2 (03-29-21 @ 07:20)    PT/INR - ( 28 Mar 2021 11:45 )   PT: 11.8 sec;   INR: 0.97 ratio         PTT - ( 28 Mar 2021 11:45 )  PTT:30.6 sec    CARDIAC MARKERS ( 29 Mar 2021 07:20 )  <.017 ng/mL / x     / x     / x     / x      CARDIAC MARKERS ( 28 Mar 2021 18:30 )  <.017 ng/mL / x     / x     / x     / x      CARDIAC MARKERS ( 28 Mar 2021 15:15 )  <.017 ng/mL / x     / x     / x     / x      CARDIAC MARKERS ( 28 Mar 2021 11:45 )  <.017 ng/mL / x     / x     / x     / x          03-29 Chol 201 mg/dL LDL -- HDL 36 mg/dL Trig 326 mg/dL              RADIOLOGY & ADDITIONAL TESTS:    Care Discussed with Consultants/Other Providers:

## 2021-03-29 NOTE — DISCHARGE NOTE PROVIDER - NSDCFUSCHEDAPPT_GEN_ALL_CORE_FT
KATHLEEN WILKINSON O ; 03/30/2021 ; [facility] RAD NucMed [address]  KATHLEEN WILKINSON O ; 03/30/2021 ; [facility] Nuclear Medicine  KATHLEEN WILKINSON O ; 03/30/2021 ; [facility] RAD NucMed [address]  KATHELEN WILKINSON O ; 03/30/2021 ; [facility]ammed  [address] [place] [address]  KATHLEEN WILKINSON O ; 03/31/2021 ; [facility]  KATHLEEN WILKINSON O ; 04/08/2021 ; [facility] Neurology [address] KATHLEEN Shea O ; 04/22/2021 ; [facility] Derm [address]  KATHLEEN WILKINSON O ; 05/04/2021 ; [facility] Gastro [address]  KATHLEEN WILKINSON O ; 05/05/2021 ; [facility] Rad Med [address]  KATHLEEN WILKINSON O ; 06/10/2021 ; [facility] CC Practice

## 2021-03-29 NOTE — DISCHARGE NOTE NURSING/CASE MANAGEMENT/SOCIAL WORK - PATIENT PORTAL LINK FT
The patient is a 62y Male complaining of You can access the FollowMyHealth Patient Portal offered by [facility] by registering at the following website: [email or web address]. By joining [employer]’s FollowMyHealth portal, you will also be able to view your health information using other applications (apps) compatible with our system.

## 2021-03-29 NOTE — DISCHARGE NOTE PROVIDER - HOSPITAL COURSE
61 yo F PMH of right breast cancer, s/p XRT last dose three days ago, s/p right breast mastectomy, hypothyroidism (not on medications), diverticulosis c/o pain under the left breast, admitted for r/o ACS. Cath 4/20/19 negative. Consulted by cardiology Dr. Whyte. Troponin negative x 3. EKG non ischemic. TTE no acute findings. EF 60-65%. COVID negative. CTA negative for PE. Chest pain likely secondary to chest radiation vs neuropathic pain vs musculoskeletal vs pleuritis. No events on tele. Pt safe for DC.    59 yo F PMH of right breast cancer, s/p XRT last dose three days ago, s/p right breast mastectomy, hypothyroidism (not on medications), diverticulosis c/o pain under the left breast, admitted for r/o ACS. Cath 4/20/19 negative. Consulted by cardiology Dr. Whyte. Troponin negative x 3. EKG non ischemic. TTE no acute findings. EF 60-65%. COVID negative. CTA negative for PE. Chest pain likely secondary to chest radiation vs neuropathic pain vs musculoskeletal vs pleuritis. No events on tele. Pt safe for DC.

## 2021-03-29 NOTE — PROGRESS NOTE ADULT - ASSESSMENT
59 yo F PMH of right breast cancer, s/p XRT last dose three days ago, s/p right breast mastectomy, hypothyroidism (not on medications), diverticulosis presenting to Er with cc of left sided chest pain.     *Left sided chest pain, r/o ACS  *Hx of right breast cancer, s/p mastectomy and XRT  *Hx of hypothyroidism, on no medications  *Hx of diverticulitis    -Chest pain likely secondary to chest radiation vs neuropathic pain vs musculoskeletal vs pleuritis vs less likely CAD/ischemia. Cath 4/20/19 negative  -Consulted by Cardiology, Dr Whyte  -CE x 3 negative , ECG   -EKG non ischemic   -TTE no acute findings. EF 60-65%  -FU A1C, lipid panel  -DC ASA 81mg daily, Lipitor 40mg qhs  -CTA negative for PE, pneumonic process  -COVID negative  -FU Outpatient     Dispo: Plan to DC home today     Spoke with HCP  Markel 59 yo F PMH of right breast cancer, s/p XRT last dose three days ago, s/p right breast mastectomy, hypothyroidism (not on medications), diverticulosis presenting to Er with cc of left sided chest pain.     *Left sided chest pain, r/o ACS  *Hx of right breast cancer, s/p mastectomy and XRT  *Hx of hypothyroidism, on no medications  *Hx of diverticulitis    -Chest pain likely secondary to chest radiation vs neuropathic pain vs musculoskeletal vs pleuritis vs less likely CAD/ischemia. Cath 4/20/19 negative  -Consulted by Cardiology, Dr Whyte  -CE x 3 negative , ECG   -EKG non ischemic   -TTE no acute findings. EF 60-65%  -A1C 5.2, Lipids elevated   -DC ASA 81mg daily, Lipitor 40mg qhs  -CTA negative for PE, pneumonic process  -COVID negative  -FU Outpatient     Dispo: Plan to DC home today     3/29 Spoke with HCP  Markel 59 yo F PMH of right breast cancer, s/p XRT last dose three days ago, s/p right breast mastectomy, hypothyroidism (not on medications), diverticulosis presenting to Er with cc of left sided chest pain.     *Left sided chest pain, r/o ACS  *Hx of right breast cancer, s/p mastectomy and XRT  *Hx of hypothyroidism, on no medications  *Hx of diverticulitis    -Chest pain likely secondary to chest radiation vs neuropathic pain vs musculoskeletal vs pleuritis vs less likely CAD/ischemia. Cath 4/20/19 negative  -Consulted by Cardiology, Dr Whyte  -CE x 3 negative , ECG   -EKG non ischemic   -TTE no acute findings. EF 60-65%  -A1C 5.2, Lipids elevated   -DC ASA 81mg daily, Lipitor 40mg qhs  -CTA negative for PE, pneumonic process  -COVID negative  -FU Outpatient     Dispo: Plan to DC home today     3/29 Spoke with HCP  Markel    Time spent on discharge 32 mins

## 2021-03-29 NOTE — PROGRESS NOTE ADULT - ATTENDING COMMENTS
I have personally seen and examined patient on the above date.  I discussed the case with NP and I agree with findings and plan as detailed per note above, which I have amended where appropriate.      ACS r/o, no longer with CP. outpatient f/u with cardio. D/C

## 2021-03-29 NOTE — DISCHARGE NOTE PROVIDER - NSDCCPCAREPLAN_GEN_ALL_CORE_FT
PRINCIPAL DISCHARGE DIAGNOSIS  Diagnosis: Pleuritic chest pain  Assessment and Plan of Treatment: Your workup is considered negative for chest pain. Troponin (heart enzyme) is negative. There is no evidence of blood clots in your lungs. Your EKG and echocardiogram is normal. Your chest pain was likely related to radiation or inflammation. You can take IBUprofen or Motrin over the counter for pain relief. 400mg-600mg every 6 hours as needed for pain. Follow up with your primary care provider in the next 1-2 weeks.       PRINCIPAL DISCHARGE DIAGNOSIS  Diagnosis: Pleuritic chest pain  Assessment and Plan of Treatment: Your workup is considered negative for chest pain. Troponin (heart enzyme) is negative. There is no evidence of blood clots in your lungs. Your EKG and echocardiogram is normal. Your chest pain was likely related to radiation or inflammation. You can take IBUprofen or Motrin over the counter for pain relief. 400mg-600mg every 6 hours as needed for pain. Follow up at your primary care provider appointment tomororow.      SECONDARY DISCHARGE DIAGNOSES  Diagnosis: High cholesterol  Assessment and Plan of Treatment: Your cholesterol levels were found to be high. Eat foods low in cholesterol. You will be starting on a statin medication to help lower your cholesterol.

## 2021-03-30 ENCOUNTER — OUTPATIENT (OUTPATIENT)
Dept: OUTPATIENT SERVICES | Facility: HOSPITAL | Age: 61
LOS: 1 days | End: 2021-03-30

## 2021-03-30 ENCOUNTER — APPOINTMENT (OUTPATIENT)
Dept: NUCLEAR MEDICINE | Facility: HOSPITAL | Age: 61
End: 2021-03-30
Payer: COMMERCIAL

## 2021-03-30 DIAGNOSIS — D35.2 BENIGN NEOPLASM OF PITUITARY GLAND: Chronic | ICD-10-CM

## 2021-03-30 DIAGNOSIS — R10.11 RIGHT UPPER QUADRANT PAIN: ICD-10-CM

## 2021-03-30 PROCEDURE — 78264 GASTRIC EMPTYING IMG STUDY: CPT | Mod: 26

## 2021-03-31 ENCOUNTER — APPOINTMENT (OUTPATIENT)
Dept: FAMILY MEDICINE | Facility: HOSPITAL | Age: 61
End: 2021-03-31

## 2021-03-31 ENCOUNTER — APPOINTMENT (OUTPATIENT)
Dept: HEMATOLOGY ONCOLOGY | Facility: CLINIC | Age: 61
End: 2021-03-31
Payer: COMMERCIAL

## 2021-03-31 ENCOUNTER — OUTPATIENT (OUTPATIENT)
Dept: OUTPATIENT SERVICES | Facility: HOSPITAL | Age: 61
LOS: 1 days | End: 2021-03-31
Payer: SELF-PAY

## 2021-03-31 VITALS
WEIGHT: 116 LBS | BODY MASS INDEX: 23.43 KG/M2 | SYSTOLIC BLOOD PRESSURE: 108 MMHG | TEMPERATURE: 97.3 F | DIASTOLIC BLOOD PRESSURE: 73 MMHG | RESPIRATION RATE: 14 BRPM | HEART RATE: 91 BPM | OXYGEN SATURATION: 98 %

## 2021-03-31 DIAGNOSIS — F43.20 ADJUSTMENT DISORDER, UNSPECIFIED: ICD-10-CM

## 2021-03-31 DIAGNOSIS — D35.2 BENIGN NEOPLASM OF PITUITARY GLAND: Chronic | ICD-10-CM

## 2021-03-31 DIAGNOSIS — F41.8 OTHER SPECIFIED ANXIETY DISORDERS: ICD-10-CM

## 2021-03-31 DIAGNOSIS — Z00.00 ENCOUNTER FOR GENERAL ADULT MEDICAL EXAMINATION WITHOUT ABNORMAL FINDINGS: ICD-10-CM

## 2021-03-31 PROCEDURE — 90785 PSYTX COMPLEX INTERACTIVE: CPT

## 2021-03-31 PROCEDURE — 90832 PSYTX W PT 30 MINUTES: CPT

## 2021-03-31 PROCEDURE — G0463: CPT

## 2021-03-31 RX ORDER — SERTRALINE 25 MG/1
25 TABLET, FILM COATED ORAL
Qty: 30 | Refills: 0 | Status: COMPLETED | COMMUNITY
Start: 2021-03-10 | End: 2021-03-31

## 2021-04-01 DIAGNOSIS — G47.00 INSOMNIA, UNSPECIFIED: ICD-10-CM

## 2021-04-01 NOTE — REVIEW OF SYSTEMS
[Skin Rash] : skin rash [Negative] : Gastrointestinal [Headache] : no headache [Dizziness] : no dizziness [Suicidal] : not suicidal [Insomnia] : no insomnia [Anxiety] : no anxiety [Depression] : no depression [FreeTextEntry9] : chest wall and breast pain

## 2021-04-01 NOTE — HISTORY OF PRESENT ILLNESS
[de-identified] : 59 y/o F presenting for f/u of insomnia and post-radiation tx for breast CA. Pt states she is doing much better with her mood. Denies symptoms of depression. Has been sleeping well with clonazepam and scheduled for f/u with neurologist next week for further eval. Understands that Klonopin is not long term solution for sleep disturbance. Has f/u with GI next month, studies so far wnl, pts symptoms reports as less bothersome now. Has some chest and breast discomfort from radiation tx. Has CTA chest to r/o PE on 3/29 after going to ED w/ c/o left chest pain. Troponins negative, CTA chest negative for PE. Denies fever, chills, HA, SOB, palpitations, n/v/d, abd pain.

## 2021-04-01 NOTE — PLAN
[FreeTextEntry1] : #Insomnia\par -Improved\par -Continue clonazepam until neuro appt\par -Understands benzos only temporary tx for sleep issues\par \par #Depression/Anxiety\par -Improved mood today, denies any depressive symptoms\par -Would like to hold off on speaking with psychiatrist for pharmacological management\par -Following with Judith JAMES\par \par RTC in 1 month for CPE\par \par Case d/w Dr. Tillman

## 2021-04-01 NOTE — PHYSICAL EXAM
[Normal] : normal rate, regular rhythm, normal S1 and S2 and no murmur heard [No Nipple Discharge] : no nipple discharge [Soft] : abdomen soft [Non Tender] : non-tender [Non-distended] : non-distended [No HSM] : no HSM [Normal Bowel Sounds] : normal bowel sounds [Normal Affect] : the affect was normal [Alert and Oriented x3] : oriented to person, place, and time [Normal Insight/Judgement] : insight and judgment were intact [de-identified] : mild erythema of left breast and axilla w/o excoriation or sloughing of skin

## 2021-04-01 NOTE — HEALTH RISK ASSESSMENT
[No] : No [0] : 1) Little interest or pleasure doing things: Not at all (0) [1] : 2) Feeling down, depressed, or hopeless for several days (1) [] : No [[id number]] : 1

## 2021-04-02 ENCOUNTER — NON-APPOINTMENT (OUTPATIENT)
Age: 61
End: 2021-04-02

## 2021-04-04 LAB
ALBUMIN SERPL ELPH-MCNC: 4.6 G/DL
ALP BLD-CCNC: 79 U/L
ALT SERPL-CCNC: 21 U/L
ANION GAP SERPL CALC-SCNC: 13 MMOL/L
AST SERPL-CCNC: 21 U/L
BASOPHILS # BLD AUTO: 0.04 K/UL
BASOPHILS NFR BLD AUTO: 0.8 %
BILIRUB SERPL-MCNC: 0.4 MG/DL
BUN SERPL-MCNC: 14 MG/DL
CALCIUM SERPL-MCNC: 10.4 MG/DL
CHLORIDE SERPL-SCNC: 99 MMOL/L
CO2 SERPL-SCNC: 28 MMOL/L
CREAT SERPL-MCNC: 0.63 MG/DL
EOSINOPHIL # BLD AUTO: 0.12 K/UL
EOSINOPHIL NFR BLD AUTO: 2.3 %
GLUCOSE SERPL-MCNC: 101 MG/DL
HCT VFR BLD CALC: 41.6 %
HEMOCCULT STL QL IA: NEGATIVE
HGB BLD-MCNC: 14.1 G/DL
IMM GRANULOCYTES NFR BLD AUTO: 0.2 %
LYMPHOCYTES # BLD AUTO: 1.92 K/UL
LYMPHOCYTES NFR BLD AUTO: 37.4 %
MAN DIFF?: NORMAL
MCHC RBC-ENTMCNC: 29.8 PG
MCHC RBC-ENTMCNC: 33.9 GM/DL
MCV RBC AUTO: 87.9 FL
MONOCYTES # BLD AUTO: 0.43 K/UL
MONOCYTES NFR BLD AUTO: 8.4 %
NEUTROPHILS # BLD AUTO: 2.61 K/UL
NEUTROPHILS NFR BLD AUTO: 50.9 %
PLATELET # BLD AUTO: 303 K/UL
POTASSIUM SERPL-SCNC: 4.8 MMOL/L
PROT SERPL-MCNC: 7.3 G/DL
RBC # BLD: 4.73 M/UL
RBC # FLD: 12.8 %
SODIUM SERPL-SCNC: 140 MMOL/L
WBC # FLD AUTO: 5.13 K/UL

## 2021-04-05 PROBLEM — F43.20 ADJUSTMENT DISORDER: Status: ACTIVE | Noted: 2021-03-19

## 2021-04-08 ENCOUNTER — APPOINTMENT (OUTPATIENT)
Dept: NEUROLOGY | Facility: HOSPITAL | Age: 61
End: 2021-04-08

## 2021-04-08 ENCOUNTER — OUTPATIENT (OUTPATIENT)
Dept: OUTPATIENT SERVICES | Facility: HOSPITAL | Age: 61
LOS: 1 days | End: 2021-04-08
Payer: SELF-PAY

## 2021-04-08 VITALS
SYSTOLIC BLOOD PRESSURE: 112 MMHG | BODY MASS INDEX: 22.98 KG/M2 | HEIGHT: 59 IN | HEART RATE: 85 BPM | TEMPERATURE: 97.4 F | DIASTOLIC BLOOD PRESSURE: 74 MMHG | WEIGHT: 114 LBS

## 2021-04-08 DIAGNOSIS — R56.9 UNSPECIFIED CONVULSIONS: ICD-10-CM

## 2021-04-08 DIAGNOSIS — D35.2 BENIGN NEOPLASM OF PITUITARY GLAND: Chronic | ICD-10-CM

## 2021-04-08 DIAGNOSIS — G47.00 INSOMNIA, UNSPECIFIED: ICD-10-CM

## 2021-04-08 PROCEDURE — 84443 ASSAY THYROID STIM HORMONE: CPT

## 2021-04-08 PROCEDURE — 84425 ASSAY OF VITAMIN B-1: CPT

## 2021-04-08 PROCEDURE — 84207 ASSAY OF VITAMIN B-6: CPT

## 2021-04-08 PROCEDURE — G0463: CPT

## 2021-04-08 PROCEDURE — 82607 VITAMIN B-12: CPT

## 2021-04-08 PROCEDURE — 82746 ASSAY OF FOLIC ACID SERUM: CPT

## 2021-04-08 RX ORDER — CLONAZEPAM 0.5 MG/1
0.5 TABLET ORAL
Qty: 15 | Refills: 0 | Status: DISCONTINUED | COMMUNITY
Start: 2021-03-10 | End: 2021-04-08

## 2021-04-08 NOTE — HISTORY OF PRESENT ILLNESS
[FreeTextEntry1] : *** INTERVAL Hx 4/8/2021 ***\par Pt present for follow up for continued issue of insomnia. Reports finished her radiation therapy in March 2021. She denies feeling sadness, anhedonia, SI or HI. But she does endorse feeling quite overwhelmed and pre-occupied by her cancer diagnosis. She does not take daytime naps, says not much issue falling asleep but maintaining sleep is problematic, waking up every hour or so. Wakes up feeling fatigued but denies overt sleepiness. She reports good family support. States she does not think she snores at night. Denies recent illness, HA, vision changes, LOC. 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 Previously has been on Zolpidem, Clonazepam, Indocin, Topamax, and Amitriptyline. As noted in prior visits, patient previously reported headaches were related to lack of sleep. Amitryptyline was discontinued and patient was started on Trazodone 50mg qHS which she tolerated well. Patient reports Trazodone was increased to 100mg qHS by an outside provider but reports worsening insomnia for the past 2 weeks. She reports the headaches have improved overall but sometimes occur with worsening sleep. She also notes weight loss of 7kg since October due to stomach pain, which has been improving gradually over the last few days. Of note she reports her puppy passed away recently. She denies SI/HI but reports having a difficult time feeling happy due to poor sleep. 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

## 2021-04-08 NOTE — REVIEW OF SYSTEMS
[Negative] : Respiratory [As Noted in HPI] : as noted in HPI [Seizures] : no convulsions [Dizziness] : no dizziness [Lightheadedness] : no lightheadedness [Vertigo] : no vertigo [Difficulty Walking] : no difficulty walking [Ataxia] : no ataxia

## 2021-04-08 NOTE — REASON FOR VISIT
[Follow-Up: _____] : a [unfilled] follow-up visit [[employer] ] : provided by [employer]   [FreeTextEntry2] : Jose Enrique [FreeTextEntry1] : [id number] [TWNoteComboBox1] : [place]

## 2021-04-08 NOTE — ASSESSMENT
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 Trazodone 50mg worked for her but stopped working while she was on radiation therapy which has since completed also. Pt did not see psychiatry because she does not feel depressed. Counseled her on her adjustment disorder and reaffirmed need for psychiatry input. She is requesting medication and is amenable to trial trazodone again. \par \par Impression: Insomnia could be a manifestation of Adjustment disorder. But given persistence and hourly interruptions would benefit from sleep eval/studies.\par \par Plan:\par [x] D/c mirtazapine and clonazepam (educated and this confirmed with patient)\par [] Commence Trazodone 50mg qhs  + melatonin 3mg PRN\par [] check B12, folate, B1, B6 and TSH/T4 \par [] Sleep specialist referral\par [] Psych referral \par \par RTC in 3 months (after all tests and referrals done and also PRN)\par \par Pt's questions and concerns addressed. She voiced understanding. \par \par Case discussed with Dr. Beverly.

## 2021-04-08 NOTE — PHYSICAL EXAM
[General Appearance - Alert] : alert [General Appearance - In No Acute Distress] : in no acute distress [General Appearance - Well Nourished] : well nourished [General Appearance - Well Developed] : well developed [General Appearance - Well-Appearing] : healthy appearing [Oriented To Time, Place, And Person] : oriented to person, place, and time [Impaired Insight] : insight and judgment were intact [Affect] : the affect was normal [Cranial Nerves Optic (II)] : visual acuity intact bilaterally,  visual fields full to confrontation, pupils equal round and reactive to light [Cranial Nerves Oculomotor (III)] : extraocular motion intact [Cranial Nerves Trigeminal (V)] : facial sensation intact symmetrically [Cranial Nerves Facial (VII)] : face symmetrical [Cranial Nerves Vestibulocochlear (VIII)] : hearing was intact bilaterally [Cranial Nerves Glossopharyngeal (IX)] : tongue and palate midline [Cranial Nerves Accessory (XI - Cranial And Spinal)] : head turning and shoulder shrug symmetric [Cranial Nerves Hypoglossal (XII)] : there was no tongue deviation with protrusion [Motor Tone] : muscle tone was normal in all four extremities [Motor Strength] : muscle strength was normal in all four extremities [Involuntary Movements] : no involuntary movements were seen [No Muscle Atrophy] : normal bulk in all four extremities [Sensation Tactile Decrease] : light touch was intact [Abnormal Walk] : normal gait [Balance] : balance was intact [2+] : Brachioradialis left 2+ [3+] : Patella left 3+ [0] : Ankle jerk left 0 [Sclera] : the sclera and conjunctiva were normal [PERRL With Normal Accommodation] : pupils were equal in size, round, reactive to light, with normal accommodation [Extraocular Movements] : extraocular movements were intact [Full Visual Field] : full visual field [Hearing Threshold Finger Rub Not Hart] : hearing was normal [Neck Appearance] : the appearance of the neck was normal [] : no respiratory distress [Exaggerated Use Of Accessory Muscles For Inspiration] : no accessory muscle use [FreeTextEntry1] : Mildly emotional and tearful [Limited Balance] : balance was intact [Past-pointing] : there was no past-pointing [Tremor] : no tremor present [Dysdiadochokinesia Bilaterally] : not present [Coordination - Dysmetria Impaired Finger-to-Nose Bilateral] : not present [Coordination - Dysmetria Impaired Heel-to-Shin Bilateral] : not present [Plantar Reflex Right Only] : normal on the right [Plantar Reflex Left Only] : normal on the left [___] : absent on the right [___] : absent on the left

## 2021-04-09 DIAGNOSIS — G47.00 INSOMNIA, UNSPECIFIED: ICD-10-CM

## 2021-04-09 LAB
FOLATE SERPL-MCNC: >20 NG/ML — SIGNIFICANT CHANGE UP
T4 FREE+ TSH PNL SERPL: 1.02 UIU/ML — SIGNIFICANT CHANGE UP (ref 0.27–4.2)
VIT B12 SERPL-MCNC: 676 PG/ML — SIGNIFICANT CHANGE UP (ref 232–1245)

## 2021-04-12 ENCOUNTER — NON-APPOINTMENT (OUTPATIENT)
Age: 61
End: 2021-04-12

## 2021-04-13 ENCOUNTER — APPOINTMENT (OUTPATIENT)
Dept: CARDIOLOGY | Facility: HOSPITAL | Age: 61
End: 2021-04-13

## 2021-04-15 ENCOUNTER — NON-APPOINTMENT (OUTPATIENT)
Age: 61
End: 2021-04-15

## 2021-04-16 LAB — VIT B1 SERPL-MCNC: 147.9 NMOL/L — SIGNIFICANT CHANGE UP (ref 66.5–200)

## 2021-04-16 NOTE — REVIEW OF SYSTEMS
2021    TELEHEALTH EVALUATION -- Audio/Visual (During [id number] public health emergency)    HPI: This 49-year-old female presents today for follow-up evaluation. Current medication list reviewed. The patient is tolerating all medications well without adverse events or known side effects. The patient is not up-to-date on all age-appropriate wellness issues. The patient states she has followed up with her dermatologist.  The patient states she recently went on a plant-based diet and her joint pain has significantly improved. Marcos Alvarez (:  1975) has requested an audio/video evaluation for the following concern(s): Follow-up evaluation. Review of Systems negative unless otherwise noted in HPI. Prior to Visit Medications    Medication Sig Taking? Authorizing Provider   amphetamine-dextroamphetamine (ADDERALL, 30MG,) 30 MG tablet Take 1 tablet by mouth 2 times daily for 30 days.  Yes Nancy Akins MD   clobetasol (TEMOVATE) 0.05 % cream APPLY 2 DROPS TOPICALLY TWICE DAILY Yes Nancy Akins MD   topiramate (TOPAMAX) 25 MG tablet Take 1 tablet by mouth nightly Yes Nancy Akins MD   Probiotic Product (PROBIOTIC ADVANCED) CAPS Take by mouth Yes Historical Provider, MD   Omega-3 Fatty Acids (FISH OIL) 1000 MG CPDR Take 2,000 mg by mouth daily Yes Historical Provider, MD       Social History     Tobacco Use    Smoking status: Former Smoker     Packs/day: 1.00     Years: 25.00     Pack years: 25.00     Quit date: 10/10/2018     Years since quittin.5    Smokeless tobacco: Never Used   Substance Use Topics    Alcohol use: Not on file    Drug use: Not on file            PHYSICAL EXAMINATION:  [ INSTRUCTIONS:  \"[x]\" Indicates a positive item  \"[]\" Indicates a negative item  -- DELETE ALL ITEMS NOT EXAMINED]  Vital Signs: (As obtained by patient/caregiver or practitioner observation)    Blood pressure-  Heart rate-    Respiratory rate-    Temperature-  Pulse oximetry-     Constitutional: [x] Appears well-developed and well-nourished [x] No apparent distress      [] Abnormal-   Mental status  [x] Alert and awake  [x] Oriented to person/place/time [x]Able to follow commands      Eyes:  EOM    [x]  Normal  [] Abnormal-  Sclera  [x]  Normal  [] Abnormal -         Discharge [x]  None visible  [] Abnormal -    HENT:   [x] Normocephalic, atraumatic. [] Abnormal   [x] Mouth/Throat: Mucous membranes are moist.     External Ears [x] Normal  [] Abnormal-     Neck: [x] No visualized mass     Pulmonary/Chest: [x] Respiratory effort normal.  [x] No visualized signs of difficulty breathing or respiratory distress        [] Abnormal-      Musculoskeletal:   [] Normal gait with no signs of ataxia         [] Normal range of motion of neck        [] Abnormal-       Neurological:        [x] No Facial Asymmetry (Cranial nerve 7 motor function) (limited exam to video visit)          [] No gaze palsy        [] Abnormal-         Skin:        [x] No significant exanthematous lesions or discoloration noted on facial skin         [] Abnormal-            Psychiatric:       [] Normal Affect [] No Hallucinations        [] Abnormal-     Other pertinent observable physical exam findings-     ASSESSMENT/PLAN: #1 ADHD plan #1 [place] automated reporting system report reviewed. #2 follow-up in 3 months. #3 continue current medical therapy. Return in about 3 months (around 7/16/2021) for MEDICATION CHECK, FOLLOW UP [facility] Jolly, was evaluated through a synchronous (real-time) audio-video encounter. The patient (or guardian if applicable) is aware that this is a billable service. Verbal consent to proceed has been obtained within the past 12 months. The visit was conducted pursuant to the emergency declaration under the [address] [facility], [address] [place] [address] authority and the [employer] and [email or web address] General Act.   Patient identification was verified, and a caregiver was present when appropriate. The patient was located in a state where the provider was credentialed to provide care. This encounter was conducted via [employer]. Total time spent on this encounter: 20 minutes. --Lona Halsted, MD on 4/16/2021 at 9:39 AM    An electronic signature was used to authenticate this note. [Negative] : Heme/Lymph

## 2021-04-17 LAB — PYRIDOXAL PHOS SERPL-MCNC: 64 UG/L — HIGH (ref 2–32.8)

## 2021-04-21 ENCOUNTER — NON-APPOINTMENT (OUTPATIENT)
Age: 61
End: 2021-04-21

## 2021-04-21 ENCOUNTER — APPOINTMENT (OUTPATIENT)
Dept: PULMONOLOGY | Facility: CLINIC | Age: 61
End: 2021-04-21
Payer: COMMERCIAL

## 2021-04-21 VITALS
BODY MASS INDEX: 22.78 KG/M2 | WEIGHT: 113 LBS | DIASTOLIC BLOOD PRESSURE: 81 MMHG | TEMPERATURE: 97.9 F | HEART RATE: 102 BPM | HEIGHT: 59 IN | SYSTOLIC BLOOD PRESSURE: 121 MMHG | OXYGEN SATURATION: 98 %

## 2021-04-21 DIAGNOSIS — F51.04 PSYCHOPHYSIOLOGIC INSOMNIA: ICD-10-CM

## 2021-04-21 PROCEDURE — 99205 OFFICE O/P NEW HI 60 MIN: CPT

## 2021-04-21 NOTE — HISTORY OF PRESENT ILLNESS
[DMS] : DMS [FreeTextEntry1] : 59 y/o F with PMH of chronic insomnia here for an initial evaluation.\par \par Her main complaint is DIS/DMS.  She has been following with neurology and has dealt with this issue for a few years now.  She has tried Zolpidem, Clonazepam, Indocin, Topamax, and Amitriptyline, now on Trazodone.  Her insomnia worsened this February when she was diagnosed with breast cancer.  She notes one awakening and difficulty falling back asleep.  She wakes up at 6:30 am and denies significant EDS with an ESS of 4.\par \par ____________________________________________________________________\par EPWORTH SLEEPINESS SCALE\par \par How likely are you to doze off or fall asleep in the situations described below, in contrast to feeling just tired?  \par This refers to your usual way of life in recent times.  \par Even if you haven't done some of these things recently, try to work out how they would have affected you.\par Use the following scale to choose one most appropriate number for each situation.\par \par Chance of dozing.............Situation\par ........................................Sitting and reading\par ........................................Watching TV\par ........................................Sitting inactive in a public place (eg a theatre or a meeting)\par ........................................As a passenger in a car for an hour without a break\par ........................................Lying down to rest in the afternoon when circumstances permit\par ........................................Sitting and talking to someone\par ........................................Sitting quietly after lunch without alcohol\par ........................................In a car, while stopped for a few minutes in traffic\par \par ........................................TOTAL SCORE\par \par 0 = Never would doze\par 1 = Slight chance of dozing\par 2 = Moderate chance of dozing\par 3 = High chance of dozing \par ______________________________________________________________________  [Primary Insomnia] : primary insomnia [DIS] : DIS

## 2021-04-21 NOTE — PHYSICAL EXAM
[General Appearance - Well Developed] : well developed [Normal Appearance] : normal appearance [Well Groomed] : well groomed [General Appearance - Well Nourished] : well nourished [No Deformities] : no deformities [General Appearance - In No Acute Distress] : no acute distress [Normal Conjunctiva] : the conjunctiva exhibited no abnormalities [Eyelids - No Xanthelasma] : the eyelids demonstrated no xanthelasmas [Normal Oropharynx] : normal oropharynx [Neck Appearance] : the appearance of the neck was normal [Neck Cervical Mass (___cm)] : no neck mass was observed [Jugular Venous Distention Increased] : there was no jugular-venous distention [Thyroid Diffuse Enlargement] : the thyroid was not enlarged [Thyroid Nodule] : there were no palpable thyroid nodules [Heart Rate And Rhythm] : heart rate was normal and rhythm regular [Heart Sounds] : normal S1 and S2 [Heart Sounds Gallop] : no gallops [Murmurs] : no murmurs [Heart Sounds Pericardial Friction Rub] : no pericardial rub [Auscultation Breath Sounds / Voice Sounds] : lungs were clear to auscultation bilaterally [Abnormal Walk] : normal gait [Musculoskeletal - Swelling] : no joint swelling seen [Motor Tone] : muscle strength and tone were normal [Nail Clubbing] : no clubbing of the fingernails [Cyanosis, Localized] : no localized cyanosis [Petechial Hemorrhages (___cm)] : no petechial hemorrhages [Skin Color & Pigmentation] : normal skin color and pigmentation [Skin Turgor] : normal skin turgor [] : no rash [Deep Tendon Reflexes (DTR)] : deep tendon reflexes were 2+ and symmetric [Sensation] : the sensory exam was normal to light touch and pinprick [No Focal Deficits] : no focal deficits [Oriented To Time, Place, And Person] : oriented to person, place, and time [Impaired Insight] : insight and judgment were intact [Affect] : the affect was normal

## 2021-04-21 NOTE — REVIEW OF SYSTEMS
[Negative] : Psychiatric [EDS: ESS=____] : no daytime somnolence [Chronic Insomnia] : chronic  insomnia

## 2021-04-21 NOTE — ASSESSMENT
[FreeTextEntry1] : 59 y/o F with PMH of chronic insomnia here for an initial evaluation.\par \par Her main complaint is DIS/DMS.  She has been following with neurology and has dealt with this issue for a few years now.  She has tried Zolpidem, Clonazepam, Indocin, Topamax, and Amitriptyline, now on Trazodone.  Her insomnia worsened this February when she was diagnosed with breast cancer.  She notes one awakening and difficulty falling back asleep.  She wakes up at 6:30 am and denies significant EDS with an ESS of 4.  At present she is going to sleep at 9:00 pm but is not falling asleep until 11:00/11:30 pm, which was her old bedtime.  Stimulus control, sleep restriction, and sleep hygiene measures were reviewed with the patient. Sleep diaries were provided to the patient.  She was advised to anchor a daily fixed ranulfo up time at 6:30 am (patient's preferred wake up time) with reporting to bed at 11:30 pm on Week 1. On Week 2, he will report to bed 30 - minutes earlier (to 11 pm) and on Week 3 at 11:00 pm to regularize, and improve sleep quality and duration. She will continue using her Trazodone qhs.  In addition, she was was advised to avoid spending large amounts of time in bed awake, to get into bed only when sleepy in order to increase sleep efficiency. The role of this intervention in strengthening the connection between the bed, bedroom environment and the ability to fall asleep was explained to the patient. The patient was referred to Dr. Herndon for CBT.\par \par  \par \par Chalo Artis, \par Pulmonary, Critical Care and Sleep Medicine Attending

## 2021-04-22 ENCOUNTER — OUTPATIENT (OUTPATIENT)
Dept: OUTPATIENT SERVICES | Facility: HOSPITAL | Age: 61
LOS: 1 days | End: 2021-04-22
Payer: SELF-PAY

## 2021-04-22 ENCOUNTER — APPOINTMENT (OUTPATIENT)
Dept: DERMATOLOGY | Facility: HOSPITAL | Age: 61
End: 2021-04-22

## 2021-04-22 ENCOUNTER — NON-APPOINTMENT (OUTPATIENT)
Age: 61
End: 2021-04-22

## 2021-04-22 VITALS
HEIGHT: 59 IN | DIASTOLIC BLOOD PRESSURE: 81 MMHG | TEMPERATURE: 97 F | BODY MASS INDEX: 22.98 KG/M2 | HEART RATE: 103 BPM | WEIGHT: 114 LBS | SYSTOLIC BLOOD PRESSURE: 121 MMHG

## 2021-04-22 DIAGNOSIS — L98.9 DISORDER OF THE SKIN AND SUBCUTANEOUS TISSUE, UNSPECIFIED: ICD-10-CM

## 2021-04-22 DIAGNOSIS — I78.1 NEVUS, NON-NEOPLASTIC: ICD-10-CM

## 2021-04-22 DIAGNOSIS — D35.2 BENIGN NEOPLASM OF PITUITARY GLAND: Chronic | ICD-10-CM

## 2021-04-22 DIAGNOSIS — L57.0 ACTINIC KERATOSIS: ICD-10-CM

## 2021-04-22 PROCEDURE — G0463: CPT

## 2021-04-22 PROCEDURE — 17000 DESTRUCT PREMALG LESION: CPT

## 2021-04-22 RX ORDER — SUCRALFATE 1 G/1
1 TABLET ORAL 4 TIMES DAILY
Qty: 120 | Refills: 3 | Status: DISCONTINUED | COMMUNITY
Start: 2020-12-16 | End: 2021-04-22

## 2021-04-26 ENCOUNTER — APPOINTMENT (OUTPATIENT)
Dept: PROCTOLOGY | Facility: HOSPITAL | Age: 61
End: 2021-04-26

## 2021-05-04 ENCOUNTER — APPOINTMENT (OUTPATIENT)
Dept: GASTROENTEROLOGY | Facility: HOSPITAL | Age: 61
End: 2021-05-04

## 2021-05-11 ENCOUNTER — APPOINTMENT (OUTPATIENT)
Dept: PULMONOLOGY | Facility: CLINIC | Age: 61
End: 2021-05-11

## 2021-05-11 NOTE — CONSULT LETTER
[Dear  ___] : Dear  [unfilled], [Consult Letter:] : I had the pleasure of evaluating your patient, [unfilled]. [Please see my note below.] : Please see my note below. [Consult Closing:] : Thank you very much for allowing me to participate in the care of this patient.  If you have any questions, please do not hesitate to contact me. [Sincerely,] : Sincerely, [FreeTextEntry3] : Ines Rey MD

## 2021-05-11 NOTE — PHYSICAL EXAM
[General Appearance - In No Acute Distress] : no acute distress [Normal Conjunctiva] : the conjunctiva exhibited no abnormalities [Normal Oropharynx] : abnormal oropharynx [III] : III [Neck Appearance] : the appearance of the neck was normal [Heart Rate And Rhythm] : heart rate was normal and rhythm regular [Heart Sounds] : normal S1 and S2 [Respiration, Rhythm And Depth] : normal respiratory rhythm and effort [Auscultation Breath Sounds / Voice Sounds] : lungs were clear to auscultation bilaterally [Involuntary Movements] : no involuntary movements were seen [Nail Clubbing] : no clubbing of the fingernails [Non-Pitting] : non-pitting [Skin Color & Pigmentation] : normal skin color and pigmentation [No Focal Deficits] : no focal deficits [Oriented To Time, Place, And Person] : oriented to person, place, and time

## 2021-05-11 NOTE — ED ADULT NURSE NOTE - NS ED NURSE RECORD ANOTHER VITAL SIGN
Poor Poor Poor Yes Poor Poor Poor Poor Poor Poor Poor Poor Poor Poor Poor Poor Poor Poor Poor Poor Poor Poor Poor Poor

## 2021-05-12 ENCOUNTER — APPOINTMENT (OUTPATIENT)
Dept: RADIATION ONCOLOGY | Facility: CLINIC | Age: 61
End: 2021-05-12
Payer: MEDICAID

## 2021-05-12 ENCOUNTER — APPOINTMENT (OUTPATIENT)
Dept: SURGICAL ONCOLOGY | Facility: CLINIC | Age: 61
End: 2021-05-12
Payer: COMMERCIAL

## 2021-05-12 VITALS
WEIGHT: 113 LBS | HEIGHT: 59 IN | SYSTOLIC BLOOD PRESSURE: 115 MMHG | BODY MASS INDEX: 22.78 KG/M2 | HEART RATE: 110 BPM | DIASTOLIC BLOOD PRESSURE: 80 MMHG | RESPIRATION RATE: 15 BRPM | OXYGEN SATURATION: 97 %

## 2021-05-12 VITALS
HEIGHT: 59 IN | OXYGEN SATURATION: 98 % | BODY MASS INDEX: 22.93 KG/M2 | WEIGHT: 113.76 LBS | HEART RATE: 110 BPM | RESPIRATION RATE: 17 BRPM | DIASTOLIC BLOOD PRESSURE: 84 MMHG | TEMPERATURE: 97.1 F | SYSTOLIC BLOOD PRESSURE: 128 MMHG

## 2021-05-12 PROCEDURE — 99215 OFFICE O/P EST HI 40 MIN: CPT

## 2021-05-12 PROCEDURE — 99024 POSTOP FOLLOW-UP VISIT: CPT

## 2021-05-12 NOTE — REASON FOR VISIT
[Post-Treatment Evaluation] : post-treatment evaluation for [Follow-Up Visit] : a follow-up visit for [FreeTextEntry2] : s/p bilateral breast lumpectomies and right axillary sentinel lymph node biopsy on 1/19/21

## 2021-05-12 NOTE — PHYSICAL EXAM
[de-identified] : mild post op and radiation changes. left areolar scar well healed. no masses or adenopathy bilaterally.

## 2021-05-12 NOTE — ADDENDUM
[FreeTextEntry1] : I, Racheal Marques, acted solely as a scribe for Dr. Sung Richardson on this date 05/12/2021.\par

## 2021-05-12 NOTE — HISTORY OF PRESENT ILLNESS
[de-identified] : Pt is a 59 y/o female who presents for a follow-up visit. She is s/p bilateral lumpectomy under deshaun  localization, right axillary sentinel node biopsy 01/19/2021. She is completed radiation on 3/26/21. She is now on Anastrozole- tolerating it well. \par She received the Covid vaccine. \par \par Pathology confirming 1. left breast lumpectomy 10:00 - benign breast + fibroadipose tissue with focal biopsy site changes. 2. Wisdom lymph node, right axilla, biopsy- one lymph node negative for metastatic carcinoma (0/1). 3. right breast lumpectomy 10:00- invasive ductal carcinoma, marlin grade 2 (tubule formation: 3, nuclear pleomorphism: 2, mitotic activity: 1; total score 6/9). Invasive tumor measures 0.2 cm in greatest dimensions. Ductal carcinoma in situ, nuclear grade 2, cribriform and solid types with focal calcifications. The DCIS spasms at a distance of approximately 0.7 cm in greatest dimension. No lymphovascular invasion identified. The Resection margins are negative for carcinoma. The invasive tumor and DCIS are 0.7 cm from the nearest margin.  \par \par Previously, she had a consultation on 12/16/20 for right breast cancer ER+/ID+, HER-2 negative. \par She is status post right breast stereotactic biopsy on December 8, 2020 that initially showed intermediate grade DCIS but additional staining revealed invasive carcinoma.\par \par Mammogram (11/24/20): Suspicious calcifications right breast. Stereotactic biopsy advised. Recommendation of stereotactic biopsy. BI-RADS 4B. Stereotactic biopsy was ultimately consistent with invasive ductal carcinoma and DCIS, ER+/ID+/HER2-. \par \par At the time of her initial visit she c/o of difficulty digesting certain foods associated with sharp stomach pain and vomiting - EGD, colonoscopy neg.  Pt. is on PPI bid.\par \par Breast MRI on 12/17/20 showed enhancement in the right breast at the site of known malignancy, as well as an approximately 6 mm enhancing area in the central left breast for which MRI guided biopsy was recommended.  Following MRI biopsy, pathology was consistent with a fibroadenoma with concern for a possible papillary lesion.\par \par She is now status post bilateral breast lumpectomies and right axillary sentinel lymph node biopsy on 1/19/21.  Final pathology of the left breast was benign.  Pathology of the right breast was 0.2 cm invasive ductal carcinoma and DCIS with one negative right axillary sentinel lymph node and negative margins ([id number]).\par \par Pt reports no family history of breast cancer.

## 2021-05-12 NOTE — ASSESSMENT
[FreeTextEntry1] : Stage I right breast cancer\par [facility]\par S/p lumpectomy radiation therapy\par Now on hormonal therapy\par RTO 3 months for exam\par Continue yearly breast imaging November 2021 \par

## 2021-05-12 NOTE — HISTORY OF PRESENT ILLNESS
[FreeTextEntry1] : Peggy Guevara is a 60 year old woman \par \par Diagnosis: 0.2cm G2 IDC with associated DCIS 0.7cm (margin 0.7cm) recently diagnosed ER and NV positive and HER2 negative. SNLB on right was negative (0/1). Left negative for malignancy.\par \par Treatment Dates: 2/26/2021 - 3/26/2021\par Breast _R  3D-Conformal  4,240 cGy  16  \par Breast boost _R  3D-Conformal  1,000 cGy     \par CLINICAL COURSE:  Ms. Guevara tolerated her radiation well without significant acute side effects.  \par \par Today: skin reaction resolved. Started anastrazole, well tolerated so far. Still having difficulty sleeping.\par Dr. Richardson 5/12, Dr. Jenknis in June

## 2021-05-12 NOTE — PHYSICAL EXAM
[Normal] : no respiratory distress, lungs were clear to auscultation bilaterally [Respiration, Rhythm And Depth] : normal respiratory rhythm and effort [Auscultation Breath Sounds / Voice Sounds] : lungs were clear to auscultation bilaterally [No UE Edema] : there is no upper extremity edema [Cervical Lymph Nodes Enlarged Posterior Bilaterally] : posterior cervical [Cervical Lymph Nodes Enlarged Anterior Bilaterally] : anterior cervical [Supraclavicular Lymph Nodes Enlarged Bilaterally] : supraclavicular [Axillary Lymph Nodes Enlarged Bilaterally] : axillary [de-identified] : right breast radiation changes resolved

## 2021-06-09 ENCOUNTER — OUTPATIENT (OUTPATIENT)
Dept: OUTPATIENT SERVICES | Facility: HOSPITAL | Age: 61
LOS: 1 days | Discharge: HOME | End: 2021-06-09

## 2021-06-09 DIAGNOSIS — D35.2 BENIGN NEOPLASM OF PITUITARY GLAND: Chronic | ICD-10-CM

## 2021-06-09 DIAGNOSIS — C50.919 MALIGNANT NEOPLASM OF UNSPECIFIED SITE OF UNSPECIFIED FEMALE BREAST: ICD-10-CM

## 2021-06-10 ENCOUNTER — APPOINTMENT (OUTPATIENT)
Dept: HEMATOLOGY ONCOLOGY | Facility: CLINIC | Age: 61
End: 2021-06-10

## 2021-06-17 ENCOUNTER — APPOINTMENT (OUTPATIENT)
Dept: PULMONOLOGY | Facility: CLINIC | Age: 61
End: 2021-06-17

## 2021-06-29 ENCOUNTER — APPOINTMENT (OUTPATIENT)
Dept: HEMATOLOGY ONCOLOGY | Facility: CLINIC | Age: 61
End: 2021-06-29
Payer: COMMERCIAL

## 2021-06-29 VITALS
BODY MASS INDEX: 22.78 KG/M2 | DIASTOLIC BLOOD PRESSURE: 79 MMHG | RESPIRATION RATE: 14 BRPM | SYSTOLIC BLOOD PRESSURE: 122 MMHG | HEART RATE: 94 BPM | OXYGEN SATURATION: 99 % | TEMPERATURE: 98.1 F | WEIGHT: 112.79 LBS

## 2021-06-29 PROCEDURE — 99213 OFFICE O/P EST LOW 20 MIN: CPT

## 2021-07-08 ENCOUNTER — APPOINTMENT (OUTPATIENT)
Dept: NEUROLOGY | Facility: HOSPITAL | Age: 61
End: 2021-07-08

## 2021-08-25 NOTE — H&P PST ADULT - VENOUS THROMBOEMBOLISM
CARDIOLOGY TRANSFER OF CARE  Progress Note     Transfer of Care Date:  8/25/2021    Type of Transfer of Care:  Cardiology    Requesting Physician:  Dr. Veliz    Chief Complaint:  Chest Pain    History of Present Illness:  Patient is a 63 year old female with past medical history of HTN, AAA who was admitted on 8/23/2021 with Intramural aortic hematoma (CMS/HCC) [I71.00]  Aortic dissection (CMS/HCC) [I71.00]    Pt presented to the ED with c/o 2 episodes of chest pain with radiation into neck earlier in the day. She has had some SOB with it. She is currently a smoker.     In the ED, her BP was 131/67 with HR of 69.  Lab work was remarkable for creatine of 1.35, NT-pbnp of 239, and negative POC troponin. NSR with ST and T wave changes in the inferior and lateral leads. CTA of the chest with ascending aorta acute intramural hematoma. Fusiform aneurysm of the ascending thoracic aorta with diameter measuring 4.8 cm. 4.3 x 3.5 cm fusiform infrarenal abdominal aortic aneurysm and severe coronary calcification. Pt was admitted for CVICU and Dr. Veliz is following. Cardiology is asked to see for pre-surgical workup including cardiac catheterization.     8/25/21: Patient seen and examined, no complaints, concerns. Patient states that she is ready for surgery.     Past Medical History:   Diagnosis Date   • AAA (abdominal aortic aneurysm) (CMS/HCC)    • Essential (primary) hypertension      History reviewed. No pertinent surgical history.  ALLERGIES:   Allergen Reactions   • Adhesive   (Environmental) RASH     Developed rash after latex tape was applied.   • Amlodipine SHORTNESS OF BREATH   • Naproxen GI UPSET   • Hope   (Food Or Med) HIVES     Prior to Admission medications    Medication Sig Start Date End Date Taking? Authorizing Provider   atenolol (TENORMIN) 25 MG tablet Take 25 mg by mouth daily. 7/17/21  Yes Outside Provider   losartan-hydrochlorothiazide (HYZAAR) 100-12.5 MG per tablet Take 1 tablet by mouth  daily. 7/17/21  Yes Outside Provider   aspirin 81 MG EC tablet Take 81 mg by mouth daily. 11/3/20  Yes Outside Provider   cetirizine (ZyrTEC) 10 MG tablet Take 10 mg by mouth daily. 1/26/21  Yes Outside Provider   Cholecalciferol (vitamin D3) 125 mcg (5,000 units) capsule Take 125 mcg by mouth daily.   Yes Outside Provider   BIOTIN PO Take 1 Dose by mouth daily.   Yes Outside Provider   COLLAGEN PO Take 2 Scoops by mouth daily.   Yes Outside Provider   Multiple Vitamin (MULTIVITAMIN PO) Take 1 tablet by mouth daily.   Yes Outside Provider   Ascorbic Acid (VITAMIN C PO) Take 1 Dose by mouth daily.   Yes Outside Provider   Calcium Carbonate Antacid (ANTACID PO) Take 1 Dose by mouth daily as needed (heartburn).   Yes Outside Provider   albuterol 108 (90 Base) MCG/ACT inhaler Inhale 2 puffs into the lungs every 6 hours as needed. 1/26/21 1/26/22  Outside Provider     Social History     Tobacco Use   • Smoking status: Current Every Day Smoker     Packs/day: 1.00   • Smokeless tobacco: Never Used   Substance Use Topics   • Alcohol use: Yes     Comment: rare     History reviewed. No pertinent family history.    Review of Systems:  10 systems reviewed and otherwise negative except for what is stated in past medical history and HPI (history of present illness).    Physical Exam:   Vitals:    08/25/21 0730   BP: 106/58   Pulse: 72   Resp: 15   Temp:        General Appearance:  alert, cooperative and no distress  Eyes:  Anicteric Sclerae  Mouth:  Moist Mucous Membranes  Heart:  regular rate and rhythm and systolic murmur: 2/6  Lungs:  clear to auscultation bilaterally  Abdomen:  soft, non-tender; bowel sounds normal; no masses,  no organomegaly  Extremities:  no edema, redness or tenderness in the calves or thighs  Pulses:  +2 Bilateral Dorsalis Pedis  Musculoskeletal:  No tenderness over chest wall  Skin:  Warm and dry, no rashes  Neuro:  Moves all 4 extremities equally, follows directions and answers questions  appropriately  Psych:  Normal mood and affect    Data Review:    Lab Results   Component Value Date    SODIUM 139 08/24/2021    POTASSIUM 4.3 08/24/2021    BUN 22 (H) 08/24/2021    CREATININE 0.95 08/24/2021    WBC 8.3 08/24/2021    HCT 42.7 08/24/2021    HGB 13.3 08/24/2021     08/24/2021    INR 1.0 08/24/2021    GLUCOSE 96 08/24/2021    TSH 0.949 08/24/2021    NTPROB 239 (H) 08/23/2021       Electrocardiogram:          Imaging:    Chest x-ray 8/23/2021  IMPRESSION:   Mild pulmonary vascular congestion.    CTA 8/23/2021  IMPRESSION:  1.  Ascending aorta acute intramural hematoma. Fusiform aneurysm of the ascending thoracic aorta with diameter measuring 4.8 cm. 4.3 x 3.5 cm fusiform infrarenal abdominal aortic aneurysm.  2.  No evidence of thoracoabdominal aortic dissection.  3.  Moderate narrowing at the ostium of the SMA and right renal artery.  4.  Cystic suspected 2 cm lesion in the pancreatic head/uncinate process, mild adjacent dilation of the main pancreatic duct. Nonemergent evaluation with pancreatic MRI for definitive characterization is suggested.  5.  Innumerable scattered solid and irregular pulmonary nodules measuring up to 6 mm. These findings may represent infectious or inflammatory etiology though metastatic disease cannot be excluded. Follow-up CT chest in 3 months is recommended to evaluate for resolution.  6.  Severe coronary artery calcifications.  7.  Up to 1.7 cm left adrenal nodular thickening with features consistent with lipid rich benign adrenal adenoma.    ECHO August 2021  Moderate calcific aortic valve stenosis.  RAFIA = 1.1 cm2. Mean gradient = 14 mmHg. DI = 0.35. Mild AR.  Normal LV cavity size, wall motion, and systolic function, EF = 62%. GLS = -17%.  Normal RV cavity size and systolic function, RV Strain = -23%. TAPSE = 28 mm.  Dilated ascending aorta (45 mm).  No pericardial effusion.  No prior study available for comparison.     Assessment and Plan:    Acute ascending aortic  intramural hematoma:   Dr. Veliz following and pre-op testing being completed.   Plan for surgery this AM with Dr. Veliz    Pre-op cardiac risk stratification:   EKG with ST and T wave changes in inferior and lateral leads. No troponin elevation.   Severe coronary artery calcifications seen on CT.   Echo as above.     HTN:  Goal SBP is less than 120.   Pt on Atenolol 25 mg, Losartan/HCTZ 100/12.5 mg. PRN Hydralazine.     Tobacco abuse:   Will continue to  on cessation.     Plan:   Plan for OR today with Dr. Veliz    Pt seen with Isabel Seals MD MPH CMQ PGY-5  [facility] Cardiology Fellow     no

## 2021-09-08 ENCOUNTER — APPOINTMENT (OUTPATIENT)
Dept: SURGICAL ONCOLOGY | Facility: CLINIC | Age: 61
End: 2021-09-08
Payer: COMMERCIAL

## 2021-09-08 VITALS
HEART RATE: 80 BPM | WEIGHT: 115 LBS | SYSTOLIC BLOOD PRESSURE: 118 MMHG | BODY MASS INDEX: 23.18 KG/M2 | OXYGEN SATURATION: 97 % | DIASTOLIC BLOOD PRESSURE: 79 MMHG | HEIGHT: 59 IN

## 2021-09-08 DIAGNOSIS — Z92.29 PERSONAL HISTORY OF OTHER DRUG THERAPY: ICD-10-CM

## 2021-09-08 PROCEDURE — 99214 OFFICE O/P EST MOD 30 MIN: CPT

## 2021-09-08 NOTE — ADDENDUM
[FreeTextEntry1] : I, Racheal Marques, acted solely as a scribe for Dr. Sung Richardson on this date 09/08/2021.\par

## 2021-09-08 NOTE — PHYSICAL EXAM
[de-identified] : right breast lumpectomy scars well healed. mild post radiation changes right breast. left breast lumpectomy scar well healed. no masses or adenopathy bilaterally.

## 2021-09-08 NOTE — REASON FOR VISIT
[Follow-Up Visit] : a follow-up visit for [FreeTextEntry2] : s/p bilateral breast lumpectomies and right axillary sentinel lymph node biopsy on 1/19/21

## 2021-09-08 NOTE — ASSESSMENT
[FreeTextEntry1] : Stage I right breast cancer\par [facility]\par S/p lumpectomy radiation therapy\par Continue hormonal therapy\par RTO 3 months after yearly breast imaging November 2021\par

## 2021-09-08 NOTE — HISTORY OF PRESENT ILLNESS
[de-identified] : Pt is a 59 y/o female who presents for a follow-up visit. She is s/p bilateral lumpectomy under deshaun  localization, right axillary sentinel node biopsy 01/19/2021.  Final pathology of the left breast was benign.  Pathology of the right breast was 0.2 cm invasive ductal carcinoma and DCIS with one negative right axillary sentinel lymph node and negative margins (T1aN0).\par \par She is completed radiation on 3/26/21. She is now on Anastrozole- tolerating it well. \par She received the Covid vaccine. \par \par Pathology confirming 1. left breast lumpectomy 10:00 - benign breast + fibroadipose tissue with focal biopsy site changes. 2. [place] lymph node, right axilla, biopsy- one lymph node negative for metastatic carcinoma (0/1). 3. right breast lumpectomy 10:00- invasive ductal carcinoma, marlin grade 2 (tubule formation: 3, nuclear pleomorphism: 2, mitotic activity: 1; total score 6/9). Invasive tumor measures 0.2 cm in greatest dimensions. Ductal carcinoma in situ, nuclear grade 2, cribriform and solid types with focal calcifications. The DCIS spasms at a distance of approximately 0.7 cm in greatest dimension. No lymphovascular invasion identified. The Resection margins are negative for carcinoma. The invasive tumor and DCIS are 0.7 cm from the nearest margin.  \par \par Previously, she had a consultation on 12/16/20 for right breast cancer ER+/HI+, HER-2 negative. \par She is status post right breast stereotactic biopsy on December 8, 2020 that initially showed intermediate grade DCIS but additional staining revealed invasive carcinoma.\par \par Mammogram (11/24/20): Suspicious calcifications right breast. Stereotactic biopsy advised. Recommendation of stereotactic biopsy. BI-RADS 4B. Stereotactic biopsy was ultimately consistent with invasive ductal carcinoma and DCIS, ER+/HI+/HER2-. \par \par At the time of her initial visit she c/o of difficulty digesting certain foods associated with sharp stomach pain and vomiting - EGD, colonoscopy neg.  Pt. is on PPI bid.\par \par Breast MRI on 12/17/20 showed enhancement in the right breast at the site of known malignancy, as well as an approximately 6 mm enhancing area in the central left breast for which MRI guided biopsy was recommended.  Following MRI biopsy, pathology was consistent with a fibroadenoma with concern for a possible papillary lesion.\par \par Pt reports no family history of breast cancer.

## 2021-10-28 ENCOUNTER — APPOINTMENT (OUTPATIENT)
Dept: HEMATOLOGY ONCOLOGY | Facility: CLINIC | Age: 61
End: 2021-10-28

## 2021-10-28 ENCOUNTER — OUTPATIENT (OUTPATIENT)
Dept: OUTPATIENT SERVICES | Facility: HOSPITAL | Age: 61
LOS: 1 days | End: 2021-10-28
Payer: SELF-PAY

## 2021-10-28 ENCOUNTER — APPOINTMENT (OUTPATIENT)
Dept: FAMILY MEDICINE | Facility: HOSPITAL | Age: 61
End: 2021-10-28

## 2021-10-28 ENCOUNTER — MED ADMIN CHARGE (OUTPATIENT)
Age: 61
End: 2021-10-28

## 2021-10-28 VITALS
BODY MASS INDEX: 23.83 KG/M2 | RESPIRATION RATE: 16 BRPM | WEIGHT: 118 LBS | HEART RATE: 92 BPM | SYSTOLIC BLOOD PRESSURE: 107 MMHG | DIASTOLIC BLOOD PRESSURE: 73 MMHG | TEMPERATURE: 97.1 F | OXYGEN SATURATION: 100 %

## 2021-10-28 DIAGNOSIS — D35.2 BENIGN NEOPLASM OF PITUITARY GLAND: Chronic | ICD-10-CM

## 2021-10-28 DIAGNOSIS — R10.11 RIGHT UPPER QUADRANT PAIN: ICD-10-CM

## 2021-10-28 DIAGNOSIS — Z00.00 ENCOUNTER FOR GENERAL ADULT MEDICAL EXAMINATION WITHOUT ABNORMAL FINDINGS: ICD-10-CM

## 2021-10-28 PROCEDURE — G0463: CPT

## 2021-10-28 PROCEDURE — 85025 COMPLETE CBC W/AUTO DIFF WBC: CPT

## 2021-10-28 PROCEDURE — 80053 COMPREHEN METABOLIC PANEL: CPT

## 2021-10-28 PROCEDURE — 80061 LIPID PANEL: CPT

## 2021-10-28 NOTE — PLAN
[FreeTextEntry1] : 59 yo female with Hx of right invasive ductal carinoma, ER/PA+, HER2-, G0cTcFg s/p right lumpectomy presents today with RUQ abdominal pain.  Patient mentions the pain started on sunday and was 10/10 at that moment, the pain worsen after she had lunch and she felt it went to her back.  Sunday night she had an episode of fever and nausea. \par \par #RUQ pain\par - Possibly 2/2 gallstones\par - Started Sunday (10/10) worst after having food, improved to 2/10 today\par - f/u US results\par - f/u Blood work\par -  Will call patient with results\par - Advised patient to go to the ED if she has another episode of fever or if her pain worsens\par \par *Case discussed with Dr. Tillman

## 2021-10-28 NOTE — HISTORY OF PRESENT ILLNESS
[FreeTextEntry8] : 59 yo female with Hx of right invasive ductal carinoma, ER/WA+, HER2-, Q4uOvTh s/p right lumpectomy presents today with RUQ abdominal pain.  Patient mentions the pain started on sunday and was 10/10 at that moment, the pain worsen after she had lunch and she felt it went to her back.  Sunday night she had an episode of fever and nausea.   Right now the pain is 2/10 and does not worsens or improve with food.  \par Patient denies vomiting, diarrhea, constipation, chest pain, headache, shortness of breath, dizziness or lightheadedness.

## 2021-10-28 NOTE — PHYSICAL EXAM
[Normal] : normal rate, regular rhythm, normal S1 and S2 and no murmur heard [Soft] : abdomen soft [Non-distended] : non-distended [No Masses] : no abdominal mass palpated [Normal Bowel Sounds] : normal bowel sounds [de-identified] : +RUQ tenderness, -Cervantes sign

## 2021-10-28 NOTE — REVIEW OF SYSTEMS
[Fever] : fever [Chills] : chills [Fatigue] : fatigue [Abdominal Pain] : abdominal pain [Nausea] : nausea [Negative] : Heme/Lymph [Constipation] : no constipation [Diarrhea] : diarrhea [Vomiting] : no vomiting [Heartburn] : no heartburn [Melena] : no melena

## 2021-10-29 DIAGNOSIS — E78.5 HYPERLIPIDEMIA, UNSPECIFIED: ICD-10-CM

## 2021-10-29 DIAGNOSIS — R10.11 RIGHT UPPER QUADRANT PAIN: ICD-10-CM

## 2021-11-03 LAB
ALBUMIN SERPL ELPH-MCNC: 3.9 G/DL
ALP BLD-CCNC: 96 U/L
ALT SERPL-CCNC: 32 U/L
ANION GAP SERPL CALC-SCNC: 12 MMOL/L
AST SERPL-CCNC: 24 U/L
BASOPHILS # BLD AUTO: 0.02 K/UL
BASOPHILS NFR BLD AUTO: 0.2 %
BILIRUB SERPL-MCNC: 0.2 MG/DL
BUN SERPL-MCNC: 16 MG/DL
CALCIUM SERPL-MCNC: 9.5 MG/DL
CHLORIDE SERPL-SCNC: 99 MMOL/L
CHOLEST SERPL-MCNC: 182 MG/DL
CO2 SERPL-SCNC: 32 MMOL/L
CREAT SERPL-MCNC: 0.69 MG/DL
EOSINOPHIL # BLD AUTO: 0.04 K/UL
EOSINOPHIL NFR BLD AUTO: 0.4 %
GLUCOSE SERPL-MCNC: 103 MG/DL
HCT VFR BLD CALC: 40.7 %
HDLC SERPL-MCNC: 36 MG/DL
HGB BLD-MCNC: 12.7 G/DL
IMM GRANULOCYTES NFR BLD AUTO: 0.3 %
LDLC SERPL CALC-MCNC: 112 MG/DL
LYMPHOCYTES # BLD AUTO: 1.17 K/UL
LYMPHOCYTES NFR BLD AUTO: 13.1 %
MAN DIFF?: NORMAL
MCHC RBC-ENTMCNC: 29.2 PG
MCHC RBC-ENTMCNC: 31.2 GM/DL
MCV RBC AUTO: 93.6 FL
MONOCYTES # BLD AUTO: 0.75 K/UL
MONOCYTES NFR BLD AUTO: 8.4 %
NEUTROPHILS # BLD AUTO: 6.9 K/UL
NEUTROPHILS NFR BLD AUTO: 77.6 %
NONHDLC SERPL-MCNC: 146 MG/DL
PLATELET # BLD AUTO: 419 K/UL
POTASSIUM SERPL-SCNC: 5.1 MMOL/L
PROT SERPL-MCNC: 6.8 G/DL
RBC # BLD: 4.35 M/UL
RBC # FLD: 12.8 %
SODIUM SERPL-SCNC: 142 MMOL/L
TRIGL SERPL-MCNC: 171 MG/DL
WBC # FLD AUTO: 8.91 K/UL

## 2021-11-11 ENCOUNTER — OUTPATIENT (OUTPATIENT)
Dept: OUTPATIENT SERVICES | Facility: HOSPITAL | Age: 61
LOS: 1 days | End: 2021-11-11
Payer: SELF-PAY

## 2021-11-11 ENCOUNTER — RESULT REVIEW (OUTPATIENT)
Age: 61
End: 2021-11-11

## 2021-11-11 DIAGNOSIS — D35.2 BENIGN NEOPLASM OF PITUITARY GLAND: Chronic | ICD-10-CM

## 2021-11-11 DIAGNOSIS — R10.11 RIGHT UPPER QUADRANT PAIN: ICD-10-CM

## 2021-11-11 PROCEDURE — 76700 US EXAM ABDOM COMPLETE: CPT

## 2021-11-11 PROCEDURE — 76700 US EXAM ABDOM COMPLETE: CPT | Mod: 26

## 2021-11-16 ENCOUNTER — OUTPATIENT (OUTPATIENT)
Dept: OUTPATIENT SERVICES | Facility: HOSPITAL | Age: 61
LOS: 1 days | End: 2021-11-16
Payer: SELF-PAY

## 2021-11-16 ENCOUNTER — APPOINTMENT (OUTPATIENT)
Dept: FAMILY MEDICINE | Facility: HOSPITAL | Age: 61
End: 2021-11-16

## 2021-11-16 VITALS
HEART RATE: 88 BPM | DIASTOLIC BLOOD PRESSURE: 75 MMHG | OXYGEN SATURATION: 98 % | SYSTOLIC BLOOD PRESSURE: 109 MMHG | RESPIRATION RATE: 17 BRPM | WEIGHT: 121 LBS | BODY MASS INDEX: 24.44 KG/M2 | TEMPERATURE: 97.1 F

## 2021-11-16 DIAGNOSIS — D35.2 BENIGN NEOPLASM OF PITUITARY GLAND: Chronic | ICD-10-CM

## 2021-11-16 DIAGNOSIS — Z00.00 ENCOUNTER FOR GENERAL ADULT MEDICAL EXAMINATION WITHOUT ABNORMAL FINDINGS: ICD-10-CM

## 2021-11-16 PROCEDURE — G0463: CPT

## 2021-11-17 DIAGNOSIS — R10.11 RIGHT UPPER QUADRANT PAIN: ICD-10-CM

## 2021-11-23 ENCOUNTER — APPOINTMENT (OUTPATIENT)
Dept: RADIATION ONCOLOGY | Facility: CLINIC | Age: 61
End: 2021-11-23
Payer: COMMERCIAL

## 2021-11-23 VITALS
SYSTOLIC BLOOD PRESSURE: 114 MMHG | HEART RATE: 90 BPM | DIASTOLIC BLOOD PRESSURE: 75 MMHG | BODY MASS INDEX: 25.18 KG/M2 | WEIGHT: 124.67 LBS | OXYGEN SATURATION: 100 % | TEMPERATURE: 97.7 F | RESPIRATION RATE: 17 BRPM

## 2021-11-23 PROCEDURE — 99212 OFFICE O/P EST SF 10 MIN: CPT

## 2021-11-23 RX ORDER — OMEPRAZOLE 40 MG/1
40 CAPSULE, DELAYED RELEASE ORAL TWICE DAILY
Qty: 60 | Refills: 3 | Status: DISCONTINUED | COMMUNITY
Start: 2021-02-23 | End: 2021-11-23

## 2021-11-23 RX ORDER — TRAZODONE HYDROCHLORIDE 50 MG/1
50 TABLET ORAL
Qty: 30 | Refills: 3 | Status: DISCONTINUED | COMMUNITY
Start: 2021-04-08 | End: 2021-11-23

## 2021-11-23 NOTE — PHYSICAL EXAM
[Respiration, Rhythm And Depth] : normal respiratory rhythm and effort [Auscultation Breath Sounds / Voice Sounds] : lungs were clear to auscultation bilaterally [No UE Edema] : there is no upper extremity edema [Cervical Lymph Nodes Enlarged Posterior Bilaterally] : posterior cervical [Cervical Lymph Nodes Enlarged Anterior Bilaterally] : anterior cervical [Supraclavicular Lymph Nodes Enlarged Bilaterally] : supraclavicular [Axillary Lymph Nodes Enlarged Bilaterally] : axillary [Normal] : normal heart rate and rhythm, normal S1 and S2, and no murmurs present [de-identified] : right breast skin thickening

## 2021-11-23 NOTE — REASON FOR VISIT
[Routine Follow-Up] : routine follow-up visit for [Interpreters_IDNumber] : [id number] [Interpreters_FullName] : Cornelius

## 2021-11-23 NOTE — HISTORY OF PRESENT ILLNESS
[FreeTextEntry1] : 60 year old woman \par \par Diagnosis: 0.2 cm G2 IDC with associated DCIS 0.7cm (margin 0.7cm) recently diagnosed ER and CT positive and HER2 negative. SLNB on right was negative (0/1). Left negative for malignancy.\par \par Treatment Dates: 2/26/2021 - 3/26/2021\par Breast _R  3D-Conformal  4,240 cGy  16  \par Breast boost _R  3D-Conformal  1,000 cGy     \par CLINICAL COURSE:  Ms. Guevara tolerated her radiation well without significant acute side effects.  \par \par 11/23/21 Patient presents today in follow-up with c/o right breast skin thickening. Patient has no complaints of pain. \par She continues to follow Dr. Richardson and will see Dr. Khalif Erazo in December. She has a mammogram scheduled for December 24, 2021

## 2021-11-23 NOTE — HISTORY OF PRESENT ILLNESS
[FreeTextEntry1] : 60 year old woman \par \par Diagnosis: 0.2 cm G2 IDC with associated DCIS 0.7cm (margin 0.7cm) recently diagnosed ER and FL positive and HER2 negative. SLNB on right was negative (0/1). Left negative for malignancy.\par \par Treatment Dates: 2/26/2021 - 3/26/2021\par Breast _R  3D-Conformal  4,240 cGy  16  \par Breast boost _R  3D-Conformal  1,000 cGy     \par CLINICAL COURSE:  Ms. Guevara tolerated her radiation well without significant acute side effects.  \par \par 11/23/21 Patient presents today in follow-up with c/o right breast skin thickening. Patient has no complaints of pain. \par She continues to follow Dr. Richardson and will see Dr. Khalif Erazo in December. She has a mammogram scheduled for December 24, 2021

## 2021-11-23 NOTE — DISEASE MANAGEMENT
[Pathological] : TNM Stage: p [IA] : IA [TTNM] : 1 [NTNM] : 0 [MTNM] : 0 [de-identified] : right breast

## 2021-11-23 NOTE — REVIEW OF SYSTEMS
[Nausea: Grade 0] : Nausea: Grade 0 [Vomiting: Grade 0] : Vomiting: Grade 0 [Breast Pain: Grade 0] : Breast Pain: Grade 0 [Fatigue: Grade 0] : Fatigue: Grade 0 [Dermatitis Radiation: Grade 1 - Faint erythema or dry desquamation] : Dermatitis Radiation: Grade 1 - Faint erythema or dry desquamation [FreeTextEntry6] : skin thickening

## 2021-11-23 NOTE — PHYSICAL EXAM
[Respiration, Rhythm And Depth] : normal respiratory rhythm and effort [Auscultation Breath Sounds / Voice Sounds] : lungs were clear to auscultation bilaterally [No UE Edema] : there is no upper extremity edema [Cervical Lymph Nodes Enlarged Posterior Bilaterally] : posterior cervical [Cervical Lymph Nodes Enlarged Anterior Bilaterally] : anterior cervical [Supraclavicular Lymph Nodes Enlarged Bilaterally] : supraclavicular [Axillary Lymph Nodes Enlarged Bilaterally] : axillary [Normal] : normal heart rate and rhythm, normal S1 and S2, and no murmurs present [de-identified] : right breast skin thickening

## 2021-11-23 NOTE — DISEASE MANAGEMENT
[Pathological] : TNM Stage: p [IA] : IA [TTNM] : 1 [NTNM] : 0 [MTNM] : 0 [de-identified] : right breast

## 2021-11-23 NOTE — REASON FOR VISIT
[Routine Follow-Up] : routine follow-up visit for [Interpreters_IDNumber] : [id number] [Interpreters_FullName] : Cornelius

## 2021-12-06 ENCOUNTER — OUTPATIENT (OUTPATIENT)
Dept: OUTPATIENT SERVICES | Facility: HOSPITAL | Age: 61
LOS: 1 days | Discharge: HOME | End: 2021-12-06

## 2021-12-06 DIAGNOSIS — C50.919 MALIGNANT NEOPLASM OF UNSPECIFIED SITE OF UNSPECIFIED FEMALE BREAST: ICD-10-CM

## 2021-12-06 DIAGNOSIS — D35.2 BENIGN NEOPLASM OF PITUITARY GLAND: Chronic | ICD-10-CM

## 2021-12-07 ENCOUNTER — APPOINTMENT (OUTPATIENT)
Age: 61
End: 2021-12-07
Payer: COMMERCIAL

## 2021-12-07 ENCOUNTER — OUTPATIENT (OUTPATIENT)
Dept: OUTPATIENT SERVICES | Facility: HOSPITAL | Age: 61
LOS: 1 days | Discharge: HOME | End: 2021-12-07

## 2021-12-07 VITALS
HEART RATE: 90 BPM | WEIGHT: 124.78 LBS | BODY MASS INDEX: 25.49 KG/M2 | DIASTOLIC BLOOD PRESSURE: 74 MMHG | HEIGHT: 58.78 IN | RESPIRATION RATE: 17 BRPM | OXYGEN SATURATION: 97 % | TEMPERATURE: 98 F | SYSTOLIC BLOOD PRESSURE: 112 MMHG

## 2021-12-07 DIAGNOSIS — D35.2 BENIGN NEOPLASM OF PITUITARY GLAND: Chronic | ICD-10-CM

## 2021-12-07 DIAGNOSIS — C50.919 MALIGNANT NEOPLASM OF UNSPECIFIED SITE OF UNSPECIFIED FEMALE BREAST: ICD-10-CM

## 2021-12-07 PROCEDURE — 99214 OFFICE O/P EST MOD 30 MIN: CPT

## 2021-12-07 NOTE — HISTORY OF PRESENT ILLNESS
[de-identified] : 60F originally from [facility], [place]- speaking mostly, with past medical history of hyperlipidemia, hepatosteatosis, depression, osteopenia, reflux esophagitis, returning for medical oncologic follow-up of right breast invasive ductal carcinoma diagnosed November 2020.\par \par CASE SYNOPSIS:\par 12/17/2020- MRI bilateral breast:\par Right breast–susceptibility artifact consistent with biopsy marker right lateral breast; associated non-mass enhancement particularly posteriorly spanning approximately 1.6 cm for which wide surgical excision is recommended.\par Left breast scattered enhancing nonspecific foci within the central left breast there is a 6 mm linear area of enhancement.  MRI biopsy with be recommended.\par 12/31/2020: MRI core guided biopsy; features compatible with fibroadenoma and detached fragment of papillary tissue.\par 1/19/2021: Bilateral lumpectomy under 70  localization, right axillary sentinel lymph lymph node biopsy; surgical pathology consistent with benign left breast changes.  Right breast 0.2 cm invasive ductal carcinoma and DCIS with 1 - right axillary sentinel lymph node and negative margins (T1a,N0)\par 2/17/2021: DEXA scan: Osteopenia in the lumbar spine and left forearm; normal bone density in the left hip\par 2/26–3/26/2021: 4240 cGy (16 fractions)+ 1000 cGy boost right breast\par March 2021: Start  anastrozole\par  [FreeTextEntry1] : Anastrozole [de-identified] : Here for biannual follow up; last seen in office in June 2021 (former patient of Dr. Jenkins). Ms. WILKINSON is doing well, complies with adjuvant hormonal treatment ( Anastrozole) since March 2021.  She  endorses minimal side effects from HRT, mild right shoulder discomfort.  No repeat mammogram since December 2020; scheduled for repeat mammogram this month.  Blood work consistent with normal CBC.  Patient is active, denies falls or fractures.  Appetite and weight preserved.  Patient interrupted work as a  since this summer.  Accompanied by her daughter, Maria Victoria.\par  \par \par \par \par

## 2021-12-07 NOTE — ASSESSMENT
[FreeTextEntry1] : Ms. WILKINSON 's questions were answered to her satisfaction. \par She  expressed her  understanding and willingness to comply with the above recommendations, and  will return to the office in 6 months.\par \par \par \par \par \par \par \par

## 2021-12-07 NOTE — PHYSICAL EXAM
[Normal] : normal appearance, no rash, nodules, vesicles, ulcers, erythema [de-identified] : Status post bilateral lumpectomy; scar well-healed. [de-identified] : Status post right axillary sentinel lymph node biopsy

## 2021-12-07 NOTE — RESULTS/DATA
[FreeTextEntry1] : Blood work results, imaging studies, and pathology reports reviewed in detail with patient .\par \par \par

## 2021-12-07 NOTE — REASON FOR VISIT
[Follow-Up Visit] : a follow-up [FreeTextEntry2] : right breast invasive ductal carcinoma [Interpreters_FullName] : Maria Victoria [Interpreters_Relationshiptopatient] : daughter [TWNoteComboBox1] : Lao

## 2021-12-24 ENCOUNTER — APPOINTMENT (OUTPATIENT)
Dept: MAMMOGRAPHY | Facility: HOSPITAL | Age: 61
End: 2021-12-24
Payer: COMMERCIAL

## 2021-12-24 ENCOUNTER — OUTPATIENT (OUTPATIENT)
Dept: OUTPATIENT SERVICES | Facility: HOSPITAL | Age: 61
LOS: 1 days | End: 2021-12-24
Payer: SELF-PAY

## 2021-12-24 ENCOUNTER — RESULT REVIEW (OUTPATIENT)
Age: 61
End: 2021-12-24

## 2021-12-24 ENCOUNTER — APPOINTMENT (OUTPATIENT)
Dept: ULTRASOUND IMAGING | Facility: HOSPITAL | Age: 61
End: 2021-12-24
Payer: COMMERCIAL

## 2021-12-24 DIAGNOSIS — D35.2 BENIGN NEOPLASM OF PITUITARY GLAND: Chronic | ICD-10-CM

## 2021-12-24 DIAGNOSIS — C50.911 MALIGNANT NEOPLASM OF UNSPECIFIED SITE OF RIGHT FEMALE BREAST: ICD-10-CM

## 2021-12-24 PROCEDURE — 77066 DX MAMMO INCL CAD BI: CPT | Mod: 26

## 2021-12-24 PROCEDURE — 77066 DX MAMMO INCL CAD BI: CPT

## 2021-12-24 PROCEDURE — G0279: CPT

## 2021-12-24 PROCEDURE — G0279: CPT | Mod: 26

## 2021-12-24 PROCEDURE — 76641 ULTRASOUND BREAST COMPLETE: CPT | Mod: 26,50

## 2021-12-24 PROCEDURE — 76641 ULTRASOUND BREAST COMPLETE: CPT

## 2022-01-10 NOTE — ED ADULT NURSE NOTE - [id number]_ED_A_ED
Dakota, No

## 2022-01-20 NOTE — HISTORY OF PRESENT ILLNESS
[FreeTextEntry1] : f/u RUQ pain [de-identified] : 61 yo female with Hx of right invasive ductal carinoma, ER/AL+, HER2-, K2kNeSb s/p right lumpectomy presents today for RUQ abdominal pain follow up.  Patient mentions her pain resolved and wanted to discuss her US results.  Patient denies abdominal pain, nausea, vomiting, diarrhea, constipation, chest pain, headache, shortness of breath, dizziness or lightheadedness.

## 2022-01-20 NOTE — PLAN
[FreeTextEntry1] : 59 yo female with Hx of right invasive ductal carinoma, ER/IA+, HER2-, [id number] s/p right lumpectomy presents today for RUQ abdominal pain follow up\par \par #RUQ pain\par - Resoved\par - US results reviewed and discuss with patient\par - Patient prefers to avoid surgical evaluation at this time\par - Blood work discussed with patient\par - Advised patient to go to the ED if she has another episode of fever or if her pain returns\par \par *Case discussed with Dr. Mathur

## 2022-02-23 ENCOUNTER — OUTPATIENT (OUTPATIENT)
Dept: OUTPATIENT SERVICES | Facility: HOSPITAL | Age: 62
LOS: 1 days | Discharge: HOME | End: 2022-02-23

## 2022-02-23 DIAGNOSIS — D35.2 BENIGN NEOPLASM OF PITUITARY GLAND: Chronic | ICD-10-CM

## 2022-02-23 DIAGNOSIS — C50.919 MALIGNANT NEOPLASM OF UNSPECIFIED SITE OF UNSPECIFIED FEMALE BREAST: ICD-10-CM

## 2022-02-24 ENCOUNTER — APPOINTMENT (OUTPATIENT)
Dept: HEMATOLOGY ONCOLOGY | Facility: CLINIC | Age: 62
End: 2022-02-24

## 2022-03-03 ENCOUNTER — APPOINTMENT (OUTPATIENT)
Age: 62
End: 2022-03-03
Payer: COMMERCIAL

## 2022-03-03 ENCOUNTER — RESULT REVIEW (OUTPATIENT)
Age: 62
End: 2022-03-03

## 2022-03-03 VITALS
OXYGEN SATURATION: 99 % | TEMPERATURE: 97.5 F | SYSTOLIC BLOOD PRESSURE: 116 MMHG | DIASTOLIC BLOOD PRESSURE: 76 MMHG | RESPIRATION RATE: 16 BRPM | BODY MASS INDEX: 25.66 KG/M2 | HEART RATE: 84 BPM | WEIGHT: 126.08 LBS

## 2022-03-03 LAB
25(OH)D3 SERPL-MCNC: 40.9 NG/ML
ALBUMIN SERPL ELPH-MCNC: 4.5 G/DL
ALP BLD-CCNC: 103 U/L
ALT SERPL-CCNC: 14 U/L
ANION GAP SERPL CALC-SCNC: 11 MMOL/L
AST SERPL-CCNC: 21 U/L
BASOPHILS # BLD AUTO: 0.02 K/UL — SIGNIFICANT CHANGE UP (ref 0–0.2)
BASOPHILS NFR BLD AUTO: 0.4 % — SIGNIFICANT CHANGE UP (ref 0–2)
BILIRUB SERPL-MCNC: 0.4 MG/DL
BUN SERPL-MCNC: 14 MG/DL
CALCIUM SERPL-MCNC: 9.9 MG/DL
CHLORIDE SERPL-SCNC: 102 MMOL/L
CO2 SERPL-SCNC: 29 MMOL/L
CREAT SERPL-MCNC: 0.58 MG/DL
EGFR: 102 ML/MIN/1.73M2
EOSINOPHIL # BLD AUTO: 0.03 K/UL — SIGNIFICANT CHANGE UP (ref 0–0.5)
EOSINOPHIL NFR BLD AUTO: 0.6 % — SIGNIFICANT CHANGE UP (ref 0–6)
GLUCOSE SERPL-MCNC: 97 MG/DL
HCT VFR BLD CALC: 43 % — SIGNIFICANT CHANGE UP (ref 34.5–45)
HGB BLD-MCNC: 14.2 G/DL — SIGNIFICANT CHANGE UP (ref 11.5–15.5)
IMM GRANULOCYTES NFR BLD AUTO: 0.2 % — SIGNIFICANT CHANGE UP (ref 0–1.5)
LYMPHOCYTES # BLD AUTO: 1.2 K/UL — SIGNIFICANT CHANGE UP (ref 1–3.3)
LYMPHOCYTES # BLD AUTO: 25.2 % — SIGNIFICANT CHANGE UP (ref 13–44)
MCHC RBC-ENTMCNC: 29.4 PG — SIGNIFICANT CHANGE UP (ref 27–34)
MCHC RBC-ENTMCNC: 33 G/DL — SIGNIFICANT CHANGE UP (ref 32–36)
MCV RBC AUTO: 89 FL — SIGNIFICANT CHANGE UP (ref 80–100)
MONOCYTES # BLD AUTO: 0.31 K/UL — SIGNIFICANT CHANGE UP (ref 0–0.9)
MONOCYTES NFR BLD AUTO: 6.5 % — SIGNIFICANT CHANGE UP (ref 2–14)
NEUTROPHILS # BLD AUTO: 3.19 K/UL — SIGNIFICANT CHANGE UP (ref 1.8–7.4)
NEUTROPHILS NFR BLD AUTO: 67.1 % — SIGNIFICANT CHANGE UP (ref 43–77)
NRBC # BLD: 0 /100 WBCS — SIGNIFICANT CHANGE UP (ref 0–0)
PLATELET # BLD AUTO: 282 K/UL — SIGNIFICANT CHANGE UP (ref 150–400)
POTASSIUM SERPL-SCNC: 4.4 MMOL/L
PROT SERPL-MCNC: 7 G/DL
RBC # BLD: 4.83 M/UL — SIGNIFICANT CHANGE UP (ref 3.8–5.2)
RBC # FLD: 13 % — SIGNIFICANT CHANGE UP (ref 10.3–14.5)
SODIUM SERPL-SCNC: 142 MMOL/L
WBC # BLD: 4.76 K/UL — SIGNIFICANT CHANGE UP (ref 3.8–10.5)
WBC # FLD AUTO: 4.76 K/UL — SIGNIFICANT CHANGE UP (ref 3.8–10.5)

## 2022-03-03 PROCEDURE — 99214 OFFICE O/P EST MOD 30 MIN: CPT

## 2022-03-03 NOTE — REASON FOR VISIT
[Follow-Up Visit] : a follow-up [FreeTextEntry2] : right breast invasive ductal carcinoma [Interpreters_IDNumber] : [id number] [Interpreters_FullName] : Purnima [FreeTextEntry3] : "[employer]" -  services\par  [TWNoteComboBox1] : Japanese

## 2022-03-03 NOTE — PHYSICAL EXAM
[Fully active, able to carry on all pre-disease performance without restriction] : Status 0 - Fully active, able to carry on all pre-disease performance without restriction [Normal] : normal appearance, no rash, nodules, vesicles, ulcers, erythema [de-identified] : Status post bilateral lumpectomy; scar well-healed. [de-identified] : Status post right axillary sentinel lymph node biopsy

## 2022-03-03 NOTE — HISTORY OF PRESENT ILLNESS
[de-identified] : 61F originally from [facility], French- speaking mostly, with PMHx of hyperlipidemia, hepatosteatosis, depression, osteopenia, reflux esophagitis, returning for medical oncologic follow-up of right breast invasive ductal carcinoma diagnosed November 2020.\par \par CASE SYNOPSIS:\par 12/17/2020- MRI bilateral breast:\par Right breast–susceptibility artifact consistent with biopsy marker right lateral breast; associated non-mass enhancement particularly posteriorly spanning approximately 1.6 cm for which wide surgical excision is recommended.\par Left breast scattered enhancing nonspecific foci within the central left breast there is a 6 mm linear area of enhancement.  MRI biopsy with be recommended.\par 12/31/2020: MRI core guided biopsy; features compatible with fibroadenoma and detached fragment of papillary tissue.\par 1/19/2021: Bilateral lumpectomy under 70  localization, right axillary sentinel lymph lymph node biopsy; surgical pathology consistent with benign left breast changes.  Right breast 0.2 cm invasive ductal carcinoma and DCIS with 1 - right axillary sentinel lymph node and negative margins (T1a,N0)\par 2/17/2021: DEXA scan: Osteopenia in the lumbar spine and left forearm; normal bone density in the left hip\par 2/26–3/26/2021: 4240 cGy (16 fractions)+ 1000 cGy boost right breast\par March 2021: Start  anastrozole\par 12/24/2021: US breast/mammogram–no mammographic or sonographic evidence of malignancy.\par  [FreeTextEntry1] : Anastrozole [de-identified] : Short interval follow-up oncology visit.  In interim patient had annual breast ultrasound on 12/24/21 consistent with no mammographic graphic evidence of malignancy.  She tolerates AI with minimal musculoskeletal complaints (right shoulder discomfort pain, no fractures).  Denies B symptoms blood work is normal range.  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  Was not yet seen for dental clearance (she does not have a dentist), but understands that she needs a dental evaluation prior to starting antiresorptive bone therapy.  Appetite and weight unchanged.\par \par \par \par  \par \par \par \par

## 2022-04-07 ENCOUNTER — NON-APPOINTMENT (OUTPATIENT)
Age: 62
End: 2022-04-07

## 2022-04-14 ENCOUNTER — OUTPATIENT (OUTPATIENT)
Dept: OUTPATIENT SERVICES | Facility: HOSPITAL | Age: 62
LOS: 1 days | Discharge: HOME | End: 2022-04-14

## 2022-04-14 DIAGNOSIS — D35.2 BENIGN NEOPLASM OF PITUITARY GLAND: Chronic | ICD-10-CM

## 2022-04-14 DIAGNOSIS — C50.919 MALIGNANT NEOPLASM OF UNSPECIFIED SITE OF UNSPECIFIED FEMALE BREAST: ICD-10-CM

## 2022-04-18 ENCOUNTER — APPOINTMENT (OUTPATIENT)
Age: 62
End: 2022-04-18

## 2022-04-18 DIAGNOSIS — C79.51 SECONDARY MALIGNANT NEOPLASM OF BONE: ICD-10-CM

## 2022-04-19 ENCOUNTER — NON-APPOINTMENT (OUTPATIENT)
Age: 62
End: 2022-04-19

## 2022-05-16 ENCOUNTER — APPOINTMENT (OUTPATIENT)
Age: 62
End: 2022-05-16

## 2022-06-02 DIAGNOSIS — Z92.3 PERSONAL HISTORY OF IRRADIATION: ICD-10-CM

## 2022-06-08 ENCOUNTER — APPOINTMENT (OUTPATIENT)
Dept: RADIATION ONCOLOGY | Facility: CLINIC | Age: 62
End: 2022-06-08
Payer: COMMERCIAL

## 2022-06-08 VITALS
RESPIRATION RATE: 16 BRPM | SYSTOLIC BLOOD PRESSURE: 118 MMHG | TEMPERATURE: 98 F | BODY MASS INDEX: 26.76 KG/M2 | WEIGHT: 127.5 LBS | HEART RATE: 78 BPM | HEIGHT: 58 IN | DIASTOLIC BLOOD PRESSURE: 80 MMHG | OXYGEN SATURATION: 100 %

## 2022-06-08 PROBLEM — Z92.29 COVID-19 VACCINE SERIES COMPLETED: Status: RESOLVED | Noted: 2022-06-08 | Resolved: 2022-06-08

## 2022-06-08 PROCEDURE — 99212 OFFICE O/P EST SF 10 MIN: CPT

## 2022-06-08 NOTE — PHYSICAL EXAM
[Breast Palpation Mass] : no palpable masses [No UE Edema] : there is no upper extremity edema [Normal] : no palpable adenopathy [de-identified] : skin thickening right breast

## 2022-06-08 NOTE — REASON FOR VISIT
[Routine Follow-Up] : routine follow-up visit for [Breast Cancer] : breast cancer [[employer] ] : provided by [employer]   [Interpreters_IDNumber] : [id number] [Interpreters_FullName] : Kayli  [TWNoteComboBox1] : [place]

## 2022-06-08 NOTE — HISTORY OF PRESENT ILLNESS
[FreeTextEntry1] : Ms. uGevara is a 61 year old Armenian speaking woman who is being seen in a follow up visit.  She is unaccompanied for today's visit. 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 SLNB on RIGHT was negative (0/1). LEFT negative for malignancy.\par \par Oncologic Management:  \par 1/19/2021 -   underwent Bilateral lumpectomy under [facility]  localization, right axillary sentinel node biopsy with Dr. Richardson. Final pathology:  RIGHT - Invasive moderately differentiated duct carcinoma. Sandhya score 6/9 (3 + 2 + 1) in this limited material. Invasive tumor measures at least 0.2 cm. Ductal carcinoma in situ, intermediate nuclear grade, cribriform and focally solid type, with calcifications. Margins of the specimen, free of tumor in submitted slides. Lymphovascular permeation by tumor not seen. One lymph node negative for metastatic carcinoma. ER: Positive (90%), CO: Positive (90%), Her-2: Negative (score 0).\par LEFT breast lumpectomy benign.\par \par 2/26/21 - 3/26/21:  Received Radiation Therapy to Right Breast with a Boost, total 5240 cGy in 20 fx\par \par 4/2021 - started on Anastrozole (Dr. Henry - oncology).  \par \par 12/24/21 - Bilateral Mammogram/US:  No mammographic or sonographic evidence of malignancy.  RECOMMENDATION:  Mammography and ultrasound in 1 year.  BI-RADS 2\par \par 6/8/22 - being seen in follow up visit.  Ms. Guevara continues on Anastrazole, tolerating well with no complaints.  She denies any pain, states that her skin remains rough and feels thick but has improved.  She continues to use moisturizer on her skin.  \par Follows with Dr. Henry (oncology)  and Dr. Richardson (surgery).

## 2022-06-08 NOTE — REVIEW OF SYSTEMS
[Negative] : Heme/Lymph [Skin Hyperpigmentation: Grade 0] : Skin Hyperpigmentation: Grade 0 [Dermatitis Radiation: Grade 1 - Faint erythema or dry desquamation] : Dermatitis Radiation: Grade 1 - Faint erythema or dry desquamation [de-identified] : continues with some roughness of skin on right breast post radiation  [FreeTextEntry6] : skin thickening

## 2022-06-10 ENCOUNTER — OUTPATIENT (OUTPATIENT)
Dept: OUTPATIENT SERVICES | Facility: HOSPITAL | Age: 62
LOS: 1 days | Discharge: HOME | End: 2022-06-10

## 2022-06-10 DIAGNOSIS — D35.2 BENIGN NEOPLASM OF PITUITARY GLAND: Chronic | ICD-10-CM

## 2022-06-10 DIAGNOSIS — C50.919 MALIGNANT NEOPLASM OF UNSPECIFIED SITE OF UNSPECIFIED FEMALE BREAST: ICD-10-CM

## 2022-06-14 ENCOUNTER — APPOINTMENT (OUTPATIENT)
Dept: HEMATOLOGY ONCOLOGY | Facility: CLINIC | Age: 62
End: 2022-06-14
Payer: COMMERCIAL

## 2022-06-14 ENCOUNTER — APPOINTMENT (OUTPATIENT)
Age: 62
End: 2022-06-14

## 2022-06-14 VITALS
OXYGEN SATURATION: 98 % | TEMPERATURE: 97.9 F | DIASTOLIC BLOOD PRESSURE: 72 MMHG | HEART RATE: 85 BPM | RESPIRATION RATE: 16 BRPM | WEIGHT: 127.87 LBS | BODY MASS INDEX: 26.72 KG/M2 | SYSTOLIC BLOOD PRESSURE: 106 MMHG

## 2022-06-14 PROCEDURE — 99214 OFFICE O/P EST MOD 30 MIN: CPT

## 2022-06-14 NOTE — ASSESSMENT
[FreeTextEntry1] : Ms. WILKINSON 's questions were answered to her satisfaction. \par She  expressed her  understanding and willingness to comply with the above recommendations, and  will return to the office in 4- 6 months.\par \par \par \par \par \par \par \par

## 2022-06-14 NOTE — HISTORY OF PRESENT ILLNESS
[Disease: _____________________] : Disease: [unfilled] [T: ___] : T[unfilled] [N: ___] : N[unfilled] [AJCC Stage: ____] : AJCC Stage: [unfilled] [de-identified] : 61F originally from [facility], [place]- speaking mostly, with PMHx of hyperlipidemia, hepatosteatosis, depression, osteopenia, reflux esophagitis, returning for medical oncologic follow-up of right breast invasive ductal carcinoma diagnosed November 2020.\par \par CASE SYNOPSIS:\par 12/17/2020- MRI bilateral breast:\par Right breast–susceptibility artifact consistent with biopsy marker right lateral breast; associated non-mass enhancement particularly posteriorly spanning approximately 1.6 cm for which wide surgical excision is recommended.\par Left breast scattered enhancing nonspecific foci within the central left breast there is a 6 mm linear area of enhancement.  MRI biopsy with be recommended.\par 12/31/2020: MRI core guided biopsy; features compatible with fibroadenoma and detached fragment of papillary tissue.\par 1/19/2021: Bilateral lumpectomy under 70  localization, right axillary sentinel lymph lymph node biopsy; surgical pathology consistent with benign left breast changes.  Right breast 0.2 cm invasive ductal carcinoma and DCIS with 1 - right axillary sentinel lymph node and negative margins (T1a,N0)\par 2/17/2021: DEXA scan: Osteopenia in the lumbar spine and left forearm; normal bone density in the left hip\par 2/26–3/26/2021: 4240 cGy (16 fractions)+ 1000 cGy boost right breast\par March 2021: Start  anastrozole\par 12/24/2021: US breast/mammogram–no mammographic or sonographic evidence of malignancy.\par 4/18/22- start Xgeva\par  [de-identified] : Invasive ductal carcinoma + DCIS [de-identified] : ER+/OH+, HER 2 nestor negative [FreeTextEntry1] : Anastrozole [de-identified] : Here for biannual follow up; last seen in office in March 2022 .\par Ms. WILKINSON is doing well, complies with adjuvant hormonal treatment (Anastrozole) for the past year (started in March 2021).  \par Endorses no significant side effects associated with endocrine therapy.  \par Last mammogram/breast US from 12/24/2021 consistent with no mammographic or sonographic evidence of malignancy .\par Since last visit patient followed up with dentist and was cleared for use of antiresorptive therapy with RANKL1 inhibitor, Denosumab ( Xgeva); received first dose in April 2022.\par Hematologic profile in normal range.\par Here accompanied by daughter, Maria Victoria, who helps with translation.\par \par \par  \par \par \par \par

## 2022-06-14 NOTE — PHYSICAL EXAM
[Fully active, able to carry on all pre-disease performance without restriction] : Status 0 - Fully active, able to carry on all pre-disease performance without restriction [Normal] : normal appearance, no rash, nodules, vesicles, ulcers, erythema [de-identified] : Status post bilateral lumpectomy; scar well-healed, firm, occasionally causing tension in the right breast. [de-identified] : Status post right axillary sentinel lymph node biopsy

## 2022-06-14 NOTE — REASON FOR VISIT
[Follow-Up Visit] : a follow-up [FreeTextEntry2] : right breast invasive ductal carcinoma [Interpreters_IDNumber] : [id number] [Interpreters_FullName] : Purnima [FreeTextEntry3] : "[employer]" -  services\par  [TWNoteComboBox1] : [place]

## 2022-06-20 NOTE — ED PROVIDER NOTE - NSTIMEPROVIDERCAREINITIATE_GEN_ER
Fasting Future Labs  Please come A FEW DAYS PRIOR TO NEXT VISIT  Please come fasting (at least 8 hours) to recheck labs.  Please drink plenty of water prior.  You can take any prescribed or routine medicine with water prior.  You can brush your teeth even if you are fasting.    
26-Aug-2019 15:47

## 2022-07-06 NOTE — H&P ADULT - ATTENDING COMMENTS
room air
Family Medicine Attending Addendum  Patient was seen on rounds, interviewed and examined with Dr. Trotter. Medical Record reviewed. History, review of systems, physical findings and lab results as documented confirmed, except as modified by me. Agree with management plan as described except as modified below.

## 2022-07-12 ENCOUNTER — APPOINTMENT (OUTPATIENT)
Age: 62
End: 2022-07-12

## 2022-08-30 ENCOUNTER — RX RENEWAL (OUTPATIENT)
Age: 62
End: 2022-08-30

## 2022-09-13 ENCOUNTER — MED ADMIN CHARGE (OUTPATIENT)
Age: 62
End: 2022-09-13

## 2022-09-13 ENCOUNTER — OUTPATIENT (OUTPATIENT)
Dept: OUTPATIENT SERVICES | Facility: HOSPITAL | Age: 62
LOS: 1 days | End: 2022-09-13
Payer: SELF-PAY

## 2022-09-13 ENCOUNTER — APPOINTMENT (OUTPATIENT)
Dept: FAMILY MEDICINE | Facility: HOSPITAL | Age: 62
End: 2022-09-13

## 2022-09-13 ENCOUNTER — OUTPATIENT (OUTPATIENT)
Dept: OUTPATIENT SERVICES | Facility: HOSPITAL | Age: 62
LOS: 1 days | End: 2022-09-13
Payer: COMMERCIAL

## 2022-09-13 VITALS
DIASTOLIC BLOOD PRESSURE: 79 MMHG | HEART RATE: 87 BPM | BODY MASS INDEX: 26.66 KG/M2 | TEMPERATURE: 98.6 F | RESPIRATION RATE: 16 BRPM | HEIGHT: 58 IN | OXYGEN SATURATION: 98 % | WEIGHT: 127 LBS | SYSTOLIC BLOOD PRESSURE: 118 MMHG

## 2022-09-13 DIAGNOSIS — D35.2 BENIGN NEOPLASM OF PITUITARY GLAND: Chronic | ICD-10-CM

## 2022-09-13 DIAGNOSIS — Z29.9 ENCOUNTER FOR PROPHYLACTIC MEASURES, UNSPECIFIED: ICD-10-CM

## 2022-09-13 DIAGNOSIS — Z00.00 ENCOUNTER FOR GENERAL ADULT MEDICAL EXAMINATION WITHOUT ABNORMAL FINDINGS: ICD-10-CM

## 2022-09-13 DIAGNOSIS — Z12.11 ENCOUNTER FOR SCREENING FOR MALIGNANT NEOPLASM OF COLON: ICD-10-CM

## 2022-09-13 PROCEDURE — 82274 ASSAY TEST FOR BLOOD FECAL: CPT

## 2022-09-13 PROCEDURE — G0463: CPT

## 2022-09-14 DIAGNOSIS — Z29.9 ENCOUNTER FOR PROPHYLACTIC MEASURES, UNSPECIFIED: ICD-10-CM

## 2022-09-14 DIAGNOSIS — Z23 ENCOUNTER FOR IMMUNIZATION: ICD-10-CM

## 2022-09-17 LAB — HEMOCCULT STL QL IA: NEGATIVE

## 2022-09-20 ENCOUNTER — OUTPATIENT (OUTPATIENT)
Dept: OUTPATIENT SERVICES | Facility: HOSPITAL | Age: 62
LOS: 1 days | End: 2022-09-20
Payer: SELF-PAY

## 2022-09-20 ENCOUNTER — APPOINTMENT (OUTPATIENT)
Dept: FAMILY MEDICINE | Facility: HOSPITAL | Age: 62
End: 2022-09-20

## 2022-09-20 VITALS
WEIGHT: 129 LBS | BODY MASS INDEX: 26.96 KG/M2 | HEART RATE: 81 BPM | TEMPERATURE: 98.1 F | OXYGEN SATURATION: 99 % | DIASTOLIC BLOOD PRESSURE: 83 MMHG | SYSTOLIC BLOOD PRESSURE: 121 MMHG | RESPIRATION RATE: 16 BRPM

## 2022-09-20 DIAGNOSIS — Z00.00 ENCOUNTER FOR GENERAL ADULT MEDICAL EXAMINATION WITHOUT ABNORMAL FINDINGS: ICD-10-CM

## 2022-09-20 DIAGNOSIS — D35.2 BENIGN NEOPLASM OF PITUITARY GLAND: Chronic | ICD-10-CM

## 2022-09-20 DIAGNOSIS — M89.8X1 OTHER SPECIFIED DISORDERS OF BONE, SHOULDER: ICD-10-CM

## 2022-09-20 DIAGNOSIS — Z00.00 ENCOUNTER FOR GENERAL ADULT MEDICAL EXAMINATION W/OUT ABNORMAL FINDINGS: ICD-10-CM

## 2022-09-21 DIAGNOSIS — C50.911 MALIGNANT NEOPLASM OF UNSPECIFIED SITE OF RIGHT FEMALE BREAST: ICD-10-CM

## 2022-09-21 DIAGNOSIS — M89.8X4 OTHER SPECIFIED DISORDERS OF BONE, HAND: ICD-10-CM

## 2022-09-21 LAB
A1C WITH ESTIMATED AVERAGE GLUCOSE RESULT: 5.4 % — SIGNIFICANT CHANGE UP (ref 4–5.6)
CHOLEST SERPL-MCNC: 218 MG/DL — HIGH
ESTIMATED AVERAGE GLUCOSE: 108 MG/DL — SIGNIFICANT CHANGE UP (ref 68–114)
HDLC SERPL-MCNC: 43 MG/DL — LOW
LIPID PNL WITH DIRECT LDL SERPL: 135 MG/DL — HIGH
NON HDL CHOLESTEROL: 175 MG/DL — HIGH
TRIGL SERPL-MCNC: 203 MG/DL — HIGH
TSH SERPL-MCNC: 1.24 UIU/ML — SIGNIFICANT CHANGE UP (ref 0.36–3.74)

## 2022-09-21 PROCEDURE — 80061 LIPID PANEL: CPT

## 2022-09-21 PROCEDURE — 83036 HEMOGLOBIN GLYCOSYLATED A1C: CPT

## 2022-09-21 PROCEDURE — 84443 ASSAY THYROID STIM HORMONE: CPT

## 2022-09-21 PROCEDURE — G0463: CPT

## 2022-09-27 NOTE — PHYSICAL EXAM
[Grossly Normal Strength/Tone] : grossly normal strength/tone [Coordination Grossly Intact] : coordination grossly intact [No Focal Deficits] : no focal deficits [Normal Gait] : normal gait [Normal] : normal rate, regular rhythm, normal S1 and S2 and no murmur heard [de-identified] : +TTP on supraspinatus muscle. full PROM of right shoulder.

## 2022-09-27 NOTE — END OF VISIT
[] : Resident [FreeTextEntry3] : Agree with assessment and plan as written and discussed with the resident.\par

## 2022-09-27 NOTE — HISTORY OF PRESENT ILLNESS
[FreeTextEntry8] : 61F with PMHx hyperlipidemia, hepatosteatosis, depression, osteopenia, reflux esophagitis, invasive ductal carcinoma s/p lumpectomy and radiation therapy presents for flu vaccine. pt states she is following-up with hematology for breast cancer and last saw jean-paul on June 2022. Heme recommended continuing anastrazole and repeat mammo in Dec 2022. Pt also complaining of right scapula pain, describes as intermittent, worse with movement and improves with tylenol which started ~3 days ago. denies fevers/chills, muscle weakness, breast pain, numbness/tingling down arms. \par \par  [FreeTextEntry1] : csp [de-identified] : 61F with PMHx hyperlipidemia, hepatosteatosis, depression, osteopenia, reflux esophagitis, invasive ductal carcinoma s/p lumpectomy and radiation therapy presents for flu vaccine. pt states she is following-up with hematology for breast cancer and last saw jean-paul on June 2022. Heme recommended continuing anastrazole and repeat mammo in Dec 2022. Pt also complaining of right scapula pain, describes as intermittent, worse with movement and improves with tylenol which started ~3 days ago. denies fevers/chills, muscle weakness, breast pain, numbness/tingling down arms. \par \par \par

## 2022-09-27 NOTE — PHYSICAL EXAM
[Grossly Normal Strength/Tone] : grossly normal strength/tone [Coordination Grossly Intact] : coordination grossly intact [No Focal Deficits] : no focal deficits [Normal Gait] : normal gait [Normal] : normal rate, regular rhythm, normal S1 and S2 and no murmur heard [de-identified] : +TTP on supraspinatus muscle. full PROM of right shoulder.

## 2022-09-27 NOTE — ASSESSMENT
[FreeTextEntry1] : 61F with PMHx hyperlipidemia, hepatosteatosis, depression, osteopenia, reflux esophagitis, invasive ductal carcinoma s/p lumpectomy and radiation therapy presents for\par \par #CSP\par -FITKIT given\par -mammo to be done 12/2022\par \par Case discussed with Dr Barnhart

## 2022-09-27 NOTE — HISTORY OF PRESENT ILLNESS
[FreeTextEntry8] : 61F with PMHx hyperlipidemia, hepatosteatosis, depression, osteopenia, reflux esophagitis, invasive ductal carcinoma s/p lumpectomy and radiation therapy presents for flu vaccine. pt states she is following-up with hematology for breast cancer and last saw jean-paul on June 2022. Heme recommended continuing anastrazole and repeat mammo in Dec 2022. Pt also complaining of right scapula pain, describes as intermittent, worse with movement and improves with tylenol which started ~3 days ago. denies fevers/chills, muscle weakness, breast pain, numbness/tingling down arms. \par \par  [FreeTextEntry1] : csp [de-identified] : 61F with PMHx hyperlipidemia, hepatosteatosis, depression, osteopenia, reflux esophagitis, invasive ductal carcinoma s/p lumpectomy and radiation therapy presents for flu vaccine. pt states she is following-up with hematology for breast cancer and last saw jean-paul on June 2022. Heme recommended continuing anastrazole and repeat mammo in Dec 2022. Pt also complaining of right scapula pain, describes as intermittent, worse with movement and improves with tylenol which started ~3 days ago. denies fevers/chills, muscle weakness, breast pain, numbness/tingling down arms. \par \par \par

## 2022-10-03 NOTE — HEALTH RISK ASSESSMENT
[Good] : ~his/her~  mood as  good [Never] : Never [No] : No [No falls in past year] : Patient reported no falls in the past year [0] : 2) Feeling down, depressed, or hopeless: Not at all (0) [PHQ-2 Negative - No further assessment needed] : PHQ-2 Negative - No further assessment needed [None] : None [Fully functional (bathing, dressing, toileting, transferring, walking, feeding)] : Fully functional (bathing, dressing, toileting, transferring, walking, feeding) [Fully functional (using the telephone, shopping, preparing meals, housekeeping, doing laundry, using] : Fully functional and needs no help or supervision to perform IADLs (using the telephone, shopping, preparing meals, housekeeping, doing laundry, using transportation, managing medications and managing finances)

## 2022-10-04 ENCOUNTER — APPOINTMENT (OUTPATIENT)
Dept: FAMILY MEDICINE | Facility: HOSPITAL | Age: 62
End: 2022-10-04

## 2022-10-04 ENCOUNTER — OUTPATIENT (OUTPATIENT)
Dept: OUTPATIENT SERVICES | Facility: HOSPITAL | Age: 62
LOS: 1 days | End: 2022-10-04
Payer: SELF-PAY

## 2022-10-04 VITALS
DIASTOLIC BLOOD PRESSURE: 72 MMHG | TEMPERATURE: 97.9 F | SYSTOLIC BLOOD PRESSURE: 110 MMHG | RESPIRATION RATE: 17 BRPM | WEIGHT: 130 LBS | OXYGEN SATURATION: 99 % | HEART RATE: 77 BPM | BODY MASS INDEX: 27.17 KG/M2

## 2022-10-04 DIAGNOSIS — Z00.00 ENCOUNTER FOR GENERAL ADULT MEDICAL EXAMINATION WITHOUT ABNORMAL FINDINGS: ICD-10-CM

## 2022-10-04 DIAGNOSIS — D35.2 BENIGN NEOPLASM OF PITUITARY GLAND: Chronic | ICD-10-CM

## 2022-10-04 PROCEDURE — G0463: CPT

## 2022-10-05 ENCOUNTER — NON-APPOINTMENT (OUTPATIENT)
Age: 62
End: 2022-10-05

## 2022-10-05 DIAGNOSIS — E78.5 HYPERLIPIDEMIA, UNSPECIFIED: ICD-10-CM

## 2022-10-10 LAB
25(OH)D3 SERPL-MCNC: 45 NG/ML
ALBUMIN SERPL ELPH-MCNC: 4 G/DL
ALP BLD-CCNC: 66 U/L
ALT SERPL-CCNC: 22 U/L
ANION GAP SERPL CALC-SCNC: 7 MMOL/L
AST SERPL-CCNC: 19 U/L
BASOPHILS # BLD AUTO: 0.02 K/UL
BASOPHILS NFR BLD AUTO: 0.6 %
BILIRUB SERPL-MCNC: 0.3 MG/DL
BUN SERPL-MCNC: 13 MG/DL
CALCIUM SERPL-MCNC: 9.7 MG/DL
CANCER AG27-29 SERPL-ACNC: 9.2 U/ML
CHLORIDE SERPL-SCNC: 105 MMOL/L
CO2 SERPL-SCNC: 30 MMOL/L
CREAT SERPL-MCNC: 0.65 MG/DL
EGFR: 99 ML/MIN/1.73M2
EOSINOPHIL # BLD AUTO: 0.08 K/UL
EOSINOPHIL NFR BLD AUTO: 2.4 %
GLUCOSE SERPL-MCNC: 109 MG/DL
HCT VFR BLD CALC: 41.5 %
HGB BLD-MCNC: 13.8 G/DL
IMM GRANULOCYTES NFR BLD AUTO: 0 %
LYMPHOCYTES # BLD AUTO: 1.28 K/UL
LYMPHOCYTES NFR BLD AUTO: 38.6 %
MAN DIFF?: NORMAL
MCHC RBC-ENTMCNC: 29.1 PG
MCHC RBC-ENTMCNC: 33.3 GM/DL
MCV RBC AUTO: 87.6 FL
MONOCYTES # BLD AUTO: 0.25 K/UL
MONOCYTES NFR BLD AUTO: 7.5 %
NEUTROPHILS # BLD AUTO: 1.69 K/UL
NEUTROPHILS NFR BLD AUTO: 50.9 %
PLATELET # BLD AUTO: 364 K/UL
POTASSIUM SERPL-SCNC: 5.5 MMOL/L
PROT SERPL-MCNC: 6.6 G/DL
RBC # BLD: 4.74 M/UL
RBC # FLD: 12.6 %
SODIUM SERPL-SCNC: 142 MMOL/L
WBC # FLD AUTO: 3.32 K/UL

## 2022-10-18 ENCOUNTER — OUTPATIENT (OUTPATIENT)
Dept: OUTPATIENT SERVICES | Facility: HOSPITAL | Age: 62
LOS: 1 days | Discharge: HOME | End: 2022-10-18

## 2022-10-18 DIAGNOSIS — C50.919 MALIGNANT NEOPLASM OF UNSPECIFIED SITE OF UNSPECIFIED FEMALE BREAST: ICD-10-CM

## 2022-10-18 DIAGNOSIS — D35.2 BENIGN NEOPLASM OF PITUITARY GLAND: Chronic | ICD-10-CM

## 2022-10-19 ENCOUNTER — OUTPATIENT (OUTPATIENT)
Dept: OUTPATIENT SERVICES | Facility: HOSPITAL | Age: 62
LOS: 1 days | Discharge: HOME | End: 2022-10-19

## 2022-10-19 DIAGNOSIS — C50.919 MALIGNANT NEOPLASM OF UNSPECIFIED SITE OF UNSPECIFIED FEMALE BREAST: ICD-10-CM

## 2022-10-19 DIAGNOSIS — D35.2 BENIGN NEOPLASM OF PITUITARY GLAND: Chronic | ICD-10-CM

## 2022-10-25 ENCOUNTER — APPOINTMENT (OUTPATIENT)
Age: 62
End: 2022-10-25

## 2022-10-25 DIAGNOSIS — C79.51 SECONDARY MALIGNANT NEOPLASM OF BONE: ICD-10-CM

## 2022-10-25 PROCEDURE — 99214 OFFICE O/P EST MOD 30 MIN: CPT

## 2022-10-25 NOTE — REASON FOR VISIT
[Follow-Up Visit] : a follow-up [FreeTextEntry2] : right breast invasive ductal carcinoma [Interpreters_IDNumber] : [id number] [Interpreters_FullName] : Jarek [FreeTextEntry3] : "[employer]" -  services\par  [TWNoteComboBox1] : Maur[place]ian

## 2022-10-25 NOTE — HISTORY OF PRESENT ILLNESS
[Disease: _____________________] : Disease: [unfilled] [T: ___] : T[unfilled] [N: ___] : N[unfilled] [AJCC Stage: ____] : AJCC Stage: [unfilled] [de-identified] : 61F originally from [facility], [place]- speaking mostly, with PMHx of hyperlipidemia, hepatosteatosis, depression, osteopenia, reflux esophagitis, returning for medical oncologic follow-up of right breast invasive ductal carcinoma diagnosed November 2020.\par \par CASE SYNOPSIS:\par 12/17/2020- MRI bilateral breast:\par Right breast–susceptibility artifact consistent with biopsy marker right lateral breast; associated non-mass enhancement particularly posteriorly spanning approximately 1.6 cm for which wide surgical excision is recommended.\par Left breast scattered enhancing nonspecific foci within the central left breast there is a 6 mm linear area of enhancement.  MRI biopsy with be recommended.\par 12/31/2020: MRI core guided biopsy; features compatible with fibroadenoma and detached fragment of papillary tissue.\par 1/19/2021: Bilateral lumpectomy under 70  localization, right axillary sentinel lymph lymph node biopsy; surgical pathology consistent with benign left breast changes.  Right breast 0.2 cm invasive ductal carcinoma and DCIS with 1 - right axillary sentinel lymph node and negative margins (T1a,N0)\par 2/17/2021: DEXA scan: Osteopenia in the lumbar spine and left forearm; normal bone density in the left hip\par 2/26–3/26/2021: 4240 cGy (16 fractions)+ 1000 cGy boost right breast\par March 2021: Start  anastrozole\par 12/24/2021: US breast/mammogram–no mammographic or sonographic evidence of malignancy.\par 4/18/22- start Xgeva\par  [de-identified] : Invasive ductal carcinoma + DCIS [de-identified] : ER+/NH+, HER 2 nestor negative [FreeTextEntry1] : Anastrozole [de-identified] : Short interval follow-up after last visit in June 2022.  Ms. WILKINSON is doing well, complies with adjuvant anastrozole (since March 2021).  She is due for repeat mammogram/breast US in December 2022.  Started denosumab (Xgeva) in April and is here for second dose.  So far no evidence of falls or fractures, minimal arthralgia.  Complains of occasional aching at the site of right breast lumpectomy scar.  Physical examination unchanged.  Laboratory tests in normal range.  Was recently seen in follow-up by family medicine (10/4/2022)–patient was encouraged to reduce body weight, adjust to low-fat diet and remain physically active.  Continues to work as a \par \par \par  \par \par \par \par

## 2022-10-25 NOTE — PHYSICAL EXAM
[Fully active, able to carry on all pre-disease performance without restriction] : Status 0 - Fully active, able to carry on all pre-disease performance without restriction [Normal] : normal appearance, no rash, nodules, vesicles, ulcers, erythema [de-identified] : Status post bilateral lumpectomy; scar well-healed, firm, occasionally causing tension in the right breast. [de-identified] : status post right axillary sentinel lymph node biopsy

## 2022-11-02 ENCOUNTER — NON-APPOINTMENT (OUTPATIENT)
Age: 62
End: 2022-11-02

## 2022-11-03 ENCOUNTER — APPOINTMENT (OUTPATIENT)
Age: 62
End: 2022-11-03

## 2022-11-03 VITALS
OXYGEN SATURATION: 99 % | HEART RATE: 71 BPM | DIASTOLIC BLOOD PRESSURE: 77 MMHG | TEMPERATURE: 97 F | RESPIRATION RATE: 16 BRPM | WEIGHT: 130.29 LBS | SYSTOLIC BLOOD PRESSURE: 123 MMHG | BODY MASS INDEX: 27.23 KG/M2

## 2022-11-03 DIAGNOSIS — N63.0 UNSPECIFIED LUMP IN UNSPECIFIED BREAST: ICD-10-CM

## 2022-11-03 PROCEDURE — 99214 OFFICE O/P EST MOD 30 MIN: CPT

## 2022-11-04 PROBLEM — N63.0 BREAST NODULE: Status: ACTIVE | Noted: 2022-11-04

## 2022-11-04 NOTE — PHYSICAL EXAM
[Fully active, able to carry on all pre-disease performance without restriction] : Status 0 - Fully active, able to carry on all pre-disease performance without restriction [Normal] : normal appearance, no rash, nodules, vesicles, ulcers, erythema [de-identified] : small, firm cyst-like nodule left inner lower quadrant, not adherent on adjacent planes; bilateral lumpectomy scars unchanged. [de-identified] : status post right axillary sentinel lymph node biopsy

## 2022-11-04 NOTE — HISTORY OF PRESENT ILLNESS
[Disease: _____________________] : Disease: [unfilled] [T: ___] : T[unfilled] [N: ___] : N[unfilled] [AJCC Stage: ____] : AJCC Stage: [unfilled] [de-identified] : 61F originally from [facility], [place]- speaking mostly, with PMHx of hyperlipidemia, hepatosteatosis, depression, osteopenia, reflux esophagitis, returning for medical oncologic follow-up of right breast invasive ductal carcinoma diagnosed November 2020.\par \par CASE SYNOPSIS:\par 12/17/2020- MRI bilateral breast:\par Right breast–susceptibility artifact consistent with biopsy marker right lateral breast; associated non-mass enhancement particularly posteriorly spanning approximately 1.6 cm for which wide surgical excision is recommended.\par Left breast scattered enhancing nonspecific foci within the central left breast there is a 6 mm linear area of enhancement.  MRI biopsy with be recommended.\par 12/31/2020: MRI core guided biopsy; features compatible with fibroadenoma and detached fragment of papillary tissue.\par 1/19/2021: Bilateral lumpectomy under 70  localization, right axillary sentinel lymph lymph node biopsy; surgical pathology consistent with benign left breast changes.  Right breast 0.2 cm invasive ductal carcinoma and DCIS with 1 - right axillary sentinel lymph node and negative margins (T1a,N0)\par 2/17/2021: DEXA scan: Osteopenia in the lumbar spine and left forearm; normal bone density in the left hip\par 2/26–3/26/2021: 4240 cGy (16 fractions)+ 1000 cGy boost right breast\par March 2021: Start  anastrozole\par 12/24/2021: US breast/mammogram–no mammographic or sonographic evidence of malignancy.\par 4/18/22- start Xgeva\par  [de-identified] : Invasive ductal carcinoma + DCIS [de-identified] : ER+/PA+, HER 2 nestor negative [FreeTextEntry1] : Anastrozole [de-identified] : Patient requested to be seen shortly after last visit on 10/25/2022, stating she palpated a new" nodule" in the left breast.  She has experienced a few seconds of pain in the left breast and when she palpated she noticed a nodule which, according to her, was not seen before.  The skin of the breast does not present erythema.  She also states she has bilateral axillary discomfort.  Her clinical exam has not changed since 10/25/2022.  On palpation, the nodule, situated in left inner lower quadrant, is likely a cyst, does not present adjacent skin changes, does not adhere on adjacent planes.  Patient is due for restaging mammogram/ultrasound in December 2022.\par \par \par \par

## 2022-11-04 NOTE — REASON FOR VISIT
[Follow-Up Visit] : a follow-up [FreeTextEntry2] : right breast invasive ductal carcinoma [Interpreters_IDNumber] : [id number] [Interpreters_FullName] : Jarek [FreeTextEntry3] : "[employer]" -  services\par  [TWNoteComboBox1] : Citizen of [place]

## 2022-11-06 NOTE — PLAN
[FreeTextEntry1] : #HLD\par stay physically active.\par Do not smoke.\par Reduce your body weight.\par Eat a low-fat diet.\par \par rtc in 3 months for weight check\par \par d/w Dr. Ball

## 2022-11-06 NOTE — PLAN
[FreeTextEntry1] : #HCM\par - F/U TSH, lipids, A1C\par - Weight: goal BMI <25. \par - Physical Activity: Advised pt 150 min/wk aerobic activity recommended\par - Medical Nutritional Therapy: Mediterranean diet recommended. \par - Smoking: non-smoker\par - UTD on Immunization\par - UTD on Cancer screening ( patient does not want any more pap smears) \par \par # Right scapular pain\par - Patient to continue otc pain medications (tylenol, advil)\par - Right shoulder home excercises sheet given to patient \par \par rtc in 2 weeks to discuss results \par \par d/w Dr. Ball\par - \par

## 2022-11-06 NOTE — HISTORY OF PRESENT ILLNESS
[FreeTextEntry1] : CPE [de-identified] : 61F with PMHx hyperlipidemia, hepatosteatosis, depression, osteopenia, reflux esophagitis, invasive ductal carcinoma s/p lumpectomy and radiation therapy presents CPE Patient seen last week for flu vaccine. pt states she is following-up with hematology for breast cancer and last saw heme on June 2022. Heme recommended continuing anastrazole and repeat mammo in Dec 2022. Pt also complaining of right scapula pain, describes as intermittent, worse with movement and improves with tylenol which started ~3 days ago. denies fevers/chills, muscle weakness, breast pain, numbness/tingling down arms. \par \par \par

## 2022-11-06 NOTE — HISTORY OF PRESENT ILLNESS
[FreeTextEntry1] : lab results [de-identified] : 61 F with PMHx hyperlipidemia, hepatosteatosis, depression, osteopenia, reflux esophagitis, invasive ductal carcinoma s/p lumpectomy and radiation therapy presents for follow up. Patient seen last seen on Sept 20th for CPE. pt states she is following-up with hematology for breast cancer and last saw heme on June 2022. Heme recommended continuing anastrazole and repeat mammo in Dec 2022. Patient with , up from previous LDL. Denies fevers/chills, muscle weakness, breast pain, numbness/tingling down arms.

## 2022-11-18 NOTE — HISTORY OF PRESENT ILLNESS
[FreeTextEntry8] : Patient complains of 2 weeks of fatigue. Symptoms are stable. Associated with constipation, cold intolerance, puffy eyes, hair loss, headaches. Denies feeling depressed or anxious. No anhedonia. \par \par Her headaches are unilateral, throbbing, without aura or visual disturbance and relieved by ibuprofen.  PAST SURGICAL HISTORY:  Fractured skull 1968    Frozen shoulder 2000    H/O Achilles tendon repair lengthened bilaterally, 2000    H/O cataract 2020    History of surgical removal of meniscus of knee left in 1971

## 2022-12-27 ENCOUNTER — RESULT REVIEW (OUTPATIENT)
Age: 62
End: 2022-12-27

## 2022-12-27 ENCOUNTER — OUTPATIENT (OUTPATIENT)
Dept: OUTPATIENT SERVICES | Facility: HOSPITAL | Age: 62
LOS: 1 days | End: 2022-12-27
Payer: SELF-PAY

## 2022-12-27 ENCOUNTER — APPOINTMENT (OUTPATIENT)
Dept: ULTRASOUND IMAGING | Facility: HOSPITAL | Age: 62
End: 2022-12-27
Payer: COMMERCIAL

## 2022-12-27 ENCOUNTER — APPOINTMENT (OUTPATIENT)
Dept: MAMMOGRAPHY | Facility: HOSPITAL | Age: 62
End: 2022-12-27
Payer: COMMERCIAL

## 2022-12-27 DIAGNOSIS — D35.2 BENIGN NEOPLASM OF PITUITARY GLAND: Chronic | ICD-10-CM

## 2022-12-27 DIAGNOSIS — Z29.9 ENCOUNTER FOR PROPHYLACTIC MEASURES, UNSPECIFIED: ICD-10-CM

## 2022-12-27 PROCEDURE — 76641 ULTRASOUND BREAST COMPLETE: CPT | Mod: 26,50

## 2022-12-27 PROCEDURE — G0279: CPT | Mod: 26

## 2022-12-27 PROCEDURE — G0279: CPT

## 2022-12-27 PROCEDURE — 77066 DX MAMMO INCL CAD BI: CPT | Mod: 26

## 2022-12-27 PROCEDURE — 77066 DX MAMMO INCL CAD BI: CPT

## 2022-12-27 PROCEDURE — 76641 ULTRASOUND BREAST COMPLETE: CPT

## 2023-01-04 ENCOUNTER — APPOINTMENT (OUTPATIENT)
Dept: SURGICAL ONCOLOGY | Facility: CLINIC | Age: 63
End: 2023-01-04
Payer: COMMERCIAL

## 2023-01-04 VITALS
OXYGEN SATURATION: 98 % | WEIGHT: 121 LBS | HEIGHT: 58 IN | HEART RATE: 89 BPM | BODY MASS INDEX: 25.4 KG/M2 | DIASTOLIC BLOOD PRESSURE: 78 MMHG | SYSTOLIC BLOOD PRESSURE: 115 MMHG | RESPIRATION RATE: 15 BRPM

## 2023-01-04 PROCEDURE — 99215 OFFICE O/P EST HI 40 MIN: CPT

## 2023-01-31 NOTE — ASSESSMENT
[FreeTextEntry1] : Stage I right breast cancer- [facility]\par BI-RADS 3 imaging December 2022\par Agree with 6 week follow up b/l sonogram February 2023\par Continue Anastrazole as per medical oncology \par RTO 6 months

## 2023-01-31 NOTE — PHYSICAL EXAM
[Normal] : supple, no neck mass and thyroid not enlarged [Normal Neck Lymph Nodes] : normal neck lymph nodes  [Normal Supraclavicular Lymph Nodes] : normal supraclavicular lymph nodes [Normal Groin Lymph Nodes] : normal groin lymph nodes [Normal Axillary Lymph Nodes] : normal axillary lymph nodes [Normal] : oriented to person, place and time, with appropriate affect [de-identified] : right breast lumpectomy scars well healed. mild post radiation changes right breast. left breast lumpectomy scar well healed. no masses or adenopathy bilaterally.

## 2023-01-31 NOTE — HISTORY OF PRESENT ILLNESS
[de-identified] : Patient is a 63 y/o female who presents for a follow-up visit for stage 1 right breast cancer. She is s/p bilateral lumpectomy and right axillary sentinel node biopsy 01/19/2021.\par She completed radiation on 3/26/21. She is now on Anastrozole in which she is tolerating it well, c/o joint pain. \par \par Her recent bilateral mammo/sono performed on 12/27/22 showed no mammographic evidence of malignancy. Area of palpable concern in the left breast corresponds to a stable 9 mm anechoic lesion sonographically and mammographically that is suggestive of an oil cyst/fat necrosis. 7 mm contained fluid collection noted in the right breast 1:00 position 6 cm from the nipple which may represent a cyst versus postoperative fluid collection. Short-term sonographic follow-up examination in 6 weeks time is suggested to assess stability and/or resolution. (BI-RADS 3)\par \par Pathology confirming 1. left breast lumpectomy 10:00 - benign breast + fibroadipose tissue with focal biopsy site changes. 2. Isabel lymph node, right axilla, biopsy- one lymph node negative for metastatic carcinoma (0/1). 3. right breast lumpectomy 10:00- invasive ductal carcinoma, marlin grade 2 (tubule formation: 3, nuclear pleomorphism: 2, mitotic activity: 1; total score 6/9). Invasive tumor measures 0.2 cm in greatest dimensions. Ductal carcinoma in situ, nuclear grade 2, cribriform and solid types with focal calcifications. The DCIS spasms at a distance of approximately 0.7 cm in greatest dimension. No lymphovascular invasion identified. The Resection margins are negative for carcinoma. The invasive tumor and DCIS are 0.7 cm from the nearest margin.  \par \par Previously, she had a consultation on 12/16/20 for right breast cancer ER+/AL+, HER-2 negative. \par She is status post right breast stereotactic biopsy on December 8, 2020 that initially showed intermediate grade DCIS but additional staining revealed invasive carcinoma.\par \par Mammogram (11/24/20): Suspicious calcifications right breast. Stereotactic biopsy advised. Recommendation of stereotactic biopsy. BI-RADS 4B. Stereotactic biopsy was ultimately consistent with invasive ductal carcinoma and DCIS, ER+/AL+/HER2-. \par \par At the time of her initial visit she c/o of difficulty digesting certain foods associated with sharp stomach pain and vomiting - EGD, colonoscopy neg.  Pt. is on PPI bid.\par \par Breast MRI on 12/17/20 showed enhancement in the right breast at the site of known malignancy, as well as an approximately 6 mm enhancing area in the central left breast for which MRI guided biopsy was recommended.  Following MRI biopsy, pathology was consistent with a fibroadenoma with concern for a possible papillary lesion.\par \par Denies any family history of breast cancer. \par Covid vaccinated.

## 2023-01-31 NOTE — ADDENDUM
[FreeTextEntry1] : I, Racheal Marques, acted solely as a scribe for Dr. Sung Richardson on this date 01/04/2023.\par \par

## 2023-02-07 ENCOUNTER — RESULT REVIEW (OUTPATIENT)
Age: 63
End: 2023-02-07

## 2023-02-07 ENCOUNTER — OUTPATIENT (OUTPATIENT)
Dept: OUTPATIENT SERVICES | Facility: HOSPITAL | Age: 63
LOS: 1 days | End: 2023-02-07
Payer: SELF-PAY

## 2023-02-07 ENCOUNTER — APPOINTMENT (OUTPATIENT)
Dept: ULTRASOUND IMAGING | Facility: HOSPITAL | Age: 63
End: 2023-02-07
Payer: COMMERCIAL

## 2023-02-07 DIAGNOSIS — D35.2 BENIGN NEOPLASM OF PITUITARY GLAND: Chronic | ICD-10-CM

## 2023-02-07 DIAGNOSIS — Z00.8 ENCOUNTER FOR OTHER GENERAL EXAMINATION: ICD-10-CM

## 2023-02-07 PROCEDURE — 76642 ULTRASOUND BREAST LIMITED: CPT | Mod: 26,RT

## 2023-02-07 PROCEDURE — 76642 ULTRASOUND BREAST LIMITED: CPT

## 2023-02-17 NOTE — H&P CARDIOLOGY - AS O2 DELIVERY
JEMIMA for patient letting her know we will need to R/S her appointment she has on 02/24/23  will be on Maternity Leave    room air

## 2023-04-21 ENCOUNTER — OUTPATIENT (OUTPATIENT)
Dept: OUTPATIENT SERVICES | Facility: HOSPITAL | Age: 63
LOS: 1 days | Discharge: HOME | End: 2023-04-21

## 2023-04-21 DIAGNOSIS — D35.2 BENIGN NEOPLASM OF PITUITARY GLAND: Chronic | ICD-10-CM

## 2023-04-21 DIAGNOSIS — C50.919 MALIGNANT NEOPLASM OF UNSPECIFIED SITE OF UNSPECIFIED FEMALE BREAST: ICD-10-CM

## 2023-04-22 ENCOUNTER — LABORATORY RESULT (OUTPATIENT)
Age: 63
End: 2023-04-22

## 2023-04-25 NOTE — PATIENT PROFILE ADULT - NSPROGENBLOODRESTRICT_GEN_A_NUR
Met with pt at bedside-family and pt confirmed address and reports that family will be home to receive pt.  Pt is bedbound, needs ambulance to travel home.
none

## 2023-04-28 ENCOUNTER — APPOINTMENT (OUTPATIENT)
Age: 63
End: 2023-04-28

## 2023-04-28 ENCOUNTER — APPOINTMENT (OUTPATIENT)
Age: 63
End: 2023-04-28
Payer: COMMERCIAL

## 2023-04-28 VITALS
SYSTOLIC BLOOD PRESSURE: 110 MMHG | RESPIRATION RATE: 16 BRPM | BODY MASS INDEX: 26.73 KG/M2 | HEART RATE: 89 BPM | WEIGHT: 127.87 LBS | TEMPERATURE: 97.7 F | DIASTOLIC BLOOD PRESSURE: 72 MMHG | OXYGEN SATURATION: 99 %

## 2023-04-28 DIAGNOSIS — C79.51 SECONDARY MALIGNANT NEOPLASM OF BONE: ICD-10-CM

## 2023-04-28 PROCEDURE — 99214 OFFICE O/P EST MOD 30 MIN: CPT

## 2023-04-29 NOTE — REASON FOR VISIT
[Follow-Up Visit] : a follow-up [FreeTextEntry2] : right breast invasive ductal carcinoma [Interpreters_IDNumber] : [id number] [Interpreters_FullName] : Reagan [FreeTextEntry3] : "[employer]" -  services.\par  [TWNoteComboBox1] : Brazilian

## 2023-04-29 NOTE — PHYSICAL EXAM
[Fully active, able to carry on all pre-disease performance without restriction] : Status 0 - Fully active, able to carry on all pre-disease performance without restriction [Normal] : normal appearance, no rash, nodules, vesicles, ulcers, erythema [de-identified] : small, firm cyst-like nodule left inner lower quadrant, not adherent on adjacent planes; bilateral lumpectomy scars unchanged. [de-identified] : status post right axillary sentinel lymph node biopsy

## 2023-04-29 NOTE — HISTORY OF PRESENT ILLNESS
[Disease: _____________________] : Disease: [unfilled] [T: ___] : T[unfilled] [N: ___] : N[unfilled] [AJCC Stage: ____] : AJCC Stage: [unfilled] [de-identified] : 62F originally from [facility], [place]- speaking mostly, with PMHx of hyperlipidemia, hepatosteatosis, depression, osteopenia, reflux esophagitis, returning for medical oncologic follow-up of right breast invasive ductal carcinoma diagnosed November 2020.\par \par CASE SYNOPSIS:\par 12/17/2020- MRI bilateral breast:\par Right breast–susceptibility artifact consistent with biopsy marker right lateral breast; associated non-mass enhancement particularly posteriorly spanning approximately 1.6 cm for which wide surgical excision is recommended.\par Left breast scattered enhancing nonspecific foci within the central left breast there is a 6 mm linear area of enhancement.  MRI biopsy with be recommended.\par 12/31/2020: MRI core guided biopsy; features compatible with fibroadenoma and detached fragment of papillary tissue.\par 1/19/2021: Bilateral lumpectomy under 70  localization, right axillary sentinel lymph lymph node biopsy; surgical pathology consistent with benign left breast changes.  Right breast 0.2 cm invasive ductal carcinoma and DCIS with 1 - right axillary sentinel lymph node and negative margins (T1a,N0)\par 2/17/2021: DEXA scan: Osteopenia in the lumbar spine and left forearm; normal bone density in the left hip\par 2/26–3/26/2021: 4240 cGy (16 fractions)+ 1000 cGy boost right breast\par March 2021: Start  anastrozole\par 12/24/2021: US breast/mammogram–no mammographic or sonographic evidence of malignancy.\par 4/18/22- start Xgeva\par 2/7/2023 right breast US Limited–9 mm cyst in the right breast 1 o'clock position, N 6 cm without significant change from prior study dated 12/27/2022, likely representing a simple cyst.  To ensure stability, a targeted 6-month right breast US is recommended. [de-identified] : Invasive ductal carcinoma + DCIS [FreeTextEntry1] : Anastrozole [de-identified] : ER+/OR+, HER 2 nestor negative [de-identified] : Here for biannual follow-up; on 2/7/2023 patient had EUS right breast follow-up of a 9 mm cyst situated 1 o'clock position, 6 cm from the nipple.  Study showed no significant change from prior images (12/27/2022), but to ensure stability, a targeted 6-month right breast US examination was recommended.\par Denies changes in physical status but is reporting new constitutional symptoms: she started developing small joint bilateral hand arthralgia.  She is taking occasional OTC NSAIDs. Physical examination show no deformities.  Appears nontoxic; hematologic profile stable.  Here for Prolia treatment.\par \par \par

## 2023-05-03 ENCOUNTER — RESULT REVIEW (OUTPATIENT)
Age: 63
End: 2023-05-03

## 2023-05-03 ENCOUNTER — APPOINTMENT (OUTPATIENT)
Dept: FAMILY MEDICINE | Facility: HOSPITAL | Age: 63
End: 2023-05-03

## 2023-05-03 ENCOUNTER — OUTPATIENT (OUTPATIENT)
Dept: OUTPATIENT SERVICES | Facility: HOSPITAL | Age: 63
LOS: 1 days | End: 2023-05-03
Payer: SELF-PAY

## 2023-05-03 VITALS
OXYGEN SATURATION: 100 % | RESPIRATION RATE: 16 BRPM | WEIGHT: 131 LBS | HEART RATE: 79 BPM | TEMPERATURE: 98.2 F | BODY MASS INDEX: 27.38 KG/M2 | DIASTOLIC BLOOD PRESSURE: 76 MMHG | SYSTOLIC BLOOD PRESSURE: 118 MMHG

## 2023-05-03 DIAGNOSIS — C50.911 MALIGNANT NEOPLASM OF UNSPECIFIED SITE OF RIGHT FEMALE BREAST: ICD-10-CM

## 2023-05-03 DIAGNOSIS — D35.2 BENIGN NEOPLASM OF PITUITARY GLAND: Chronic | ICD-10-CM

## 2023-05-03 DIAGNOSIS — Z00.00 ENCOUNTER FOR GENERAL ADULT MEDICAL EXAMINATION WITHOUT ABNORMAL FINDINGS: ICD-10-CM

## 2023-05-03 PROCEDURE — G0463: CPT

## 2023-05-05 DIAGNOSIS — C50.911 MALIGNANT NEOPLASM OF UNSPECIFIED SITE OF RIGHT FEMALE BREAST: ICD-10-CM

## 2023-05-05 DIAGNOSIS — D12.2 BENIGN NEOPLASM OF ASCENDING COLON: ICD-10-CM

## 2023-05-05 DIAGNOSIS — K57.32 DIVERTICULITIS OF LARGE INTESTINE WITHOUT PERFORATION OR ABSCESS WITHOUT BLEEDING: ICD-10-CM

## 2023-05-06 ENCOUNTER — OUTPATIENT (OUTPATIENT)
Dept: OUTPATIENT SERVICES | Facility: HOSPITAL | Age: 63
LOS: 1 days | End: 2023-05-06
Payer: SELF-PAY

## 2023-05-06 DIAGNOSIS — E78.5 HYPERLIPIDEMIA, UNSPECIFIED: ICD-10-CM

## 2023-05-06 DIAGNOSIS — D35.2 BENIGN NEOPLASM OF PITUITARY GLAND: Chronic | ICD-10-CM

## 2023-05-06 PROCEDURE — 36415 COLL VENOUS BLD VENIPUNCTURE: CPT

## 2023-05-06 PROCEDURE — 80061 LIPID PANEL: CPT

## 2023-05-06 NOTE — HISTORY OF PRESENT ILLNESS
[FreeTextEntry1] : follow up right breast cancer [de-identified] : 62F  with PMHx of hyperlipidemia, hepatosteatosis, depression, osteopenia, reflux esophagitis, presents to clinic for  follow-up of right breast invasive ductal carcinoma diagnosed November 2020. Patient being managed by [facility] ( [place]). Recently started feeling joint pain on left 1st digit metacarpal along with some burning sensation near same location. [facility] held chemotherapy med ( anastrazole) for 2 weeks due to this symptoms. Patient in need of US of breast per [facility]. Denies fevers, chills, n/v, cough, sick contacts, SOB, CP, urinary /bowel changes. \par \par

## 2023-05-06 NOTE — PLAN
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 Tillman

## 2023-05-16 ENCOUNTER — NON-APPOINTMENT (OUTPATIENT)
Age: 63
End: 2023-05-16

## 2023-05-18 ENCOUNTER — OUTPATIENT (OUTPATIENT)
Dept: OUTPATIENT SERVICES | Facility: HOSPITAL | Age: 63
LOS: 1 days | End: 2023-05-18
Payer: SELF-PAY

## 2023-05-18 ENCOUNTER — APPOINTMENT (OUTPATIENT)
Dept: RADIOLOGY | Facility: HOSPITAL | Age: 63
End: 2023-05-18
Payer: COMMERCIAL

## 2023-05-18 DIAGNOSIS — D35.2 BENIGN NEOPLASM OF PITUITARY GLAND: Chronic | ICD-10-CM

## 2023-05-18 DIAGNOSIS — M85.80 OTHER SPECIFIED DISORDERS OF BONE DENSITY AND STRUCTURE, UNSPECIFIED SITE: ICD-10-CM

## 2023-05-18 LAB
CHOLEST SERPL-MCNC: 239 MG/DL
HDLC SERPL-MCNC: 39 MG/DL
LDLC SERPL CALC-MCNC: NORMAL MG/DL
NONHDLC SERPL-MCNC: 201 MG/DL
TRIGL SERPL-MCNC: 432 MG/DL

## 2023-05-18 PROCEDURE — 77080 DXA BONE DENSITY AXIAL: CPT | Mod: 26

## 2023-05-18 NOTE — PATIENT PROFILE PEDIATRIC. - SLEEP LOCATION, PEDS PROFILE
Nutrition Assessment      Reason for Consult/Assessment: Follow up      Diagnosis and Hx: Reviewed    Pertinent Nutrition History: Sigmoid colon CA s/p resection 4/27/23                                    Diet Order: TPN/PPN, Full liquid                Diet tolerance: Tolerating with poor appetite/intakes recorded   Food Allergies: None known    Demographic/Anthropometrics Information     Gender: female   Patient Age: 58 year old  Height:   Ht Readings from Last 1 Encounters:   05/08/23 5' 4\" (1.626 m)      Weight:   Wt Readings from Last 1 Encounters:   05/18/23 67.5 kg      BMI:   BMI Readings from Last 1 Encounters:   05/18/23 25.54 kg/m²          % Weight Change: No wt loss reported per MST           Estimated Needs:     Calculated Energy Needs: 6867-8755  kcal               Calculated protein needs:   g    Calculated Fluid Needs: Per Provider              NFPE     Nutrition Focused Physical Exam  Physical Exam Completed: Yes  Body Fat  Overall Body Fat: Normal  Muscle Mass  Overall Muscle Mass: Normal                5/13/23: Pt admitted with abd pain/nausea. Workup for post op partial SBO. NGT placed 5/12-output with 3.2L documented past shift. Pt has been NPO/clears x5 days. Noted plan per MD for PPN starting tomorrow. Midline was placed today. Discussed nutrition plan with pt. RD to follow tomorrow.     5/14/23: NGT output with 1600ml past shift documented. Will start PPN tonight, discussed with pt, RN, and [facility] pharmacist.     5/15/23: NGT output 500 mL past shift documented. PPN infusing and will continue tomorrow. Discussed with pt, RN, MD and [facility] pharm. Small BM and gas yesterday. Pt ambulating.     5/16/23: PPN infusing. NGT removed. Having small  BMs and flatus. Slightly increased volume for next order to provide more nutrition.     5/17/23: PPN infusing. Had some chicken broth and tea for breakfast. Just ordered a late clear liquid lunch. Pt reports she had some abdominal pain last night so  taking it slow. Had a BM this morning. Receiving 20 mEq KCl replacement.      5/18/23: Pt continues to order only broth and juice despite full liquid advancement. Continue PPN today.     TREATMENT PLAN: Monitoring & Interventions   Intervention: Parenteral nutrition/IV fluids, Nutrition education, Meals and snacks         Meals & snacks: Monitor diet advancement/tolerance                                      TPN:  Volume: 2000 mL  Access Site:  (midline)   Lipids Provided by Parenteral Nutrition: 250ml 20% lipids (50g)  Protein Provided by Parenteral Nutrition: 85 g  Dextrose Provided by Parenteral Nutrition: 120 g  Calories Provided by Parenteral Nutrition: 1250 kcal (70% est caloric needs, 100% est protein needs) - unable to increase macros or mirconutrients due to peripheral line/osmolarity.         Nutrition education: Low Fiber diet education was given this admission    Goal: Meet >/equal 75% estimated needs, Tolerate oral diet   Intervention goal status: Some progress toward goal  Time frame to achieve goal: Ongoing     Dietitian will monitor: Biochemical data, medical tests, procedures, Parenteral nutrition intake, Diet advancement      Nutrition Diagnosis / PES     Nutrition Diagnosis: Inadequate oral intake  Related to: Altered GI function/GI disorder   As evidenced by: Documented/reported poor oral intake      Primary Nutrition Diagnosis status: Active nutrition diagnosis                    bed

## 2023-05-23 ENCOUNTER — OUTPATIENT (OUTPATIENT)
Dept: OUTPATIENT SERVICES | Facility: HOSPITAL | Age: 63
LOS: 1 days | End: 2023-05-23

## 2023-05-23 DIAGNOSIS — E78.5 HYPERLIPIDEMIA, UNSPECIFIED: ICD-10-CM

## 2023-05-23 DIAGNOSIS — D35.2 BENIGN NEOPLASM OF PITUITARY GLAND: Chronic | ICD-10-CM

## 2023-05-23 PROCEDURE — 77080 DXA BONE DENSITY AXIAL: CPT

## 2023-05-23 PROCEDURE — 80061 LIPID PANEL: CPT

## 2023-05-23 NOTE — ED ADULT NURSE NOTE - CHIEF COMPLAINT QUOTE
Consent: Verbal consent obtained and the risks of skin tag removal was reviewed with the patient including but not limited to bleeding, pigmentary change, infection, pain, and remote possibility of scarring.
Medical Necessity Information: It is in your best interest to select a reason for this procedure from the list below. All of these items fulfill various CMS LCD requirements except the new and changing color options.
Detail Level: Zone
Hemostasis: Pressure
Add Associated Diagnoses If Applicable When Selecting Medical Necessity: Yes
Include Z78.9 (Other Specified Conditions Influencing Health Status) As An Associated Diagnosis?: No
Medical Necessity Clause: This procedure was medically necessary because the lesions that were treated were:
Back pain x 3 weeks

## 2023-05-26 LAB
CHOLEST SERPL-MCNC: 228 MG/DL
HDLC SERPL-MCNC: 44 MG/DL
LDLC SERPL CALC-MCNC: 148 MG/DL
NONHDLC SERPL-MCNC: 184 MG/DL
TRIGL SERPL-MCNC: 179 MG/DL

## 2023-05-30 ENCOUNTER — NON-APPOINTMENT (OUTPATIENT)
Age: 63
End: 2023-05-30

## 2023-06-06 ENCOUNTER — NON-APPOINTMENT (OUTPATIENT)
Age: 63
End: 2023-06-06

## 2023-06-12 DIAGNOSIS — H33.20 SEROUS RETINAL DETACHMENT, UNSPECIFIED EYE: ICD-10-CM

## 2023-06-14 NOTE — ED PROVIDER NOTE - GASTROINTESTINAL NEGATIVE STATEMENT, MLM
Size Of Lesion In Cm: 3 X Size Of Lesion In Cm (Optional): 2 Size Of Margin In Cm: 0 Anesthesia Volume In Cc: 2.9 Excision Method: 10 mm Punch Repair Type: Intermediate Intermediate / Complex Repair - Final Wound Length In Cm: 1 Complex Requirements: Extensive Undermining Performed?: No Undermining Type: Entire Wound Debridement Text: The wound edges were debrided prior to proceeding with the closure to facilitate wound healing. Helical Rim Text: The closure involved the helical rim. Vermilion Border Text: The closure involved the vermilion border. Nostril Rim Text: The closure involved the nostril rim. Retention Suture Text: Retention sutures were placed to support the closure and prevent dehiscence. Suture Removal: 14 days Lab: -[id number] Detail Level: Detailed Excision Depth: adipose tissue Medical Necessity Clause: The excision was medically necessary because the lesion which was excised was Anesthesia Type: 1% lidocaine with epinephrine Additional Anesthesia Volume In Cc: 6 Hemostasis: Electrocautery Estimated Blood Loss (Cc): minimal Epidermal Sutures: 4-0 Prolene Epidermal Closure: simple interrupted Wound Care: Petrolatum Dressing: dry sterile dressing Complex Repair Preamble Text (Leave Blank If You Do Not Want): Extensive wide undermining was performed. Intermediate Repair Preamble Text (Leave Blank If You Do Not Want): Undermining was performed with blunt dissection. 1.5 Mm Punch Excision Text: A 1.5 mm punch was used to make an initial incision over the lesion. After this overlying column of skin was removed, blunt dissection was used to free the lesion from the surrounding tissues and the lesion was extirpated through the surgical opening made by the punch biopsy. 2 Mm Punch Excision Text: A 2 mm punch was used to make an initial incision over the lesion. After this overlying column of skin was removed, blunt dissection was used to free the lesion from the surrounding tissues and the lesion was extirpated through the surgical opening made by the punch biopsy. 2.5 Mm Punch Excision Text: A 2.5 mm punch was used to make an initial incision over the lesion. After this overlying column of skin was removed, blunt dissection was used to free the lesion from the surrounding tissues and the lesion was extirpated through the surgical opening made by the punch biopsy. 3 Mm Punch Excision Text: A 3 mm punch was used to make an initial incision over the lesion. After this overlying column of skin was removed, blunt dissection was used to free the lesion from the surrounding tissues and the lesion was extirpated through the surgical opening made by the punch biopsy. 3.5 Mm Punch Excision Text: A 3.5 mm punch was used to make an initial incision over the lesion. After this overlying column of skin was removed, blunt dissection was used to free the lesion from the surrounding tissues and the lesion was extirpated through the surgical opening made by the punch biopsy. 4 Mm Punch Excision Text: A 4 mm punch was used to make an initial incision over the lesion. After this overlying column of skin was removed, blunt dissection was used to free the lesion from the surrounding tissues and the lesion was extirpated through the surgical opening made by the punch biopsy. 4.5 Mm Punch Excision Text: A 4.5 mm punch was used to make an initial incision over the lesion. After this overlying column of skin was removed, blunt dissection was used to free the lesion from the surrounding tissues and the lesion was extirpated through the surgical opening made by the punch biopsy. 5 Mm Punch Excision Text: A 5 mm punch was used to make an initial incision over the lesion. After this overlying column of skin was removed, blunt dissection was used to free the lesion from the surrounding tissues and the lesion was extirpated through the surgical opening made by the punch biopsy. 6 Mm Punch Excision Text: A 6 mm punch was used to make an initial incision over the lesion. After this overlying column of skin was removed, blunt dissection was used to free the lesion from the surrounding tissues and the lesion was extirpated through the surgical opening made by the punch biopsy. 7 Mm Punch Excision Text: A 7 mm punch was used to make an initial incision over the lesion. After this overlying column of skin was removed, blunt dissection was used to free the lesion from the surrounding tissues and the lesion was extirpated through the surgical opening made by the punch biopsy. 8 Mm Punch Excision Text: A 8 mm punch was used to make an initial incision over the lesion. After this overlying column of skin was removed, blunt dissection was used to free the lesion from the surrounding tissues and the lesion was extirpated through the surgical opening made by the punch biopsy. 10 Mm Punch Excision Text: A 10 mm punch was used to make an initial incision over the lesion. After this overlying column of skin was removed, blunt dissection was used to free the lesion from the surrounding tissues and the lesion was extirpated through the surgical opening made by the punch biopsy. 12 Mm Punch Excision Text: A 12 mm punch was used to make an initial incision over the lesion. After this overlying column of skin was removed, blunt dissection was used to free the lesion from the surrounding tissues and the lesion was extirpated through the surgical opening made by the punch biopsy. Purse String (Intermediate) Text: Given the location of the defect and the characteristics of the surrounding skin a purse string intermediate closure was deemed most appropriate. Undermining was performed circumferentially around the surgical defect. A purse string suture was then placed and tightened. Path Notes (To The Dermatopathologist): Please check margins. Medical Necessity Clause: This procedure was medically necessary because the lesion that was treated was: Consent was obtained from the patient. The risks and benefits to therapy were discussed in detail. Specifically, the risks of infection, scarring, bleeding, prolonged wound healing, incomplete removal, allergy to anesthesia, nerve injury and recurrence were addressed. Prior to the procedure, the treatment site was clearly identified and confirmed by the patient. All components of Universal Protocol/PAUSE Rule completed. Render Post-Care Instructions In Note?: yes Post-Care Instructions: I reviewed with the patient in detail post-care instructions. Patient is not to engage in any heavy lifting, exercise, or swimming for the next 14 days. Should the patient develop any fevers, chills, bleeding, severe pain patient will contact the office immediately. Billing Type: [employer] no abdominal pain, no bloating, no constipation, no diarrhea, no nausea and no vomiting.

## 2023-07-17 ENCOUNTER — APPOINTMENT (OUTPATIENT)
Dept: OPHTHALMOLOGY | Facility: CLINIC | Age: 63
End: 2023-07-17

## 2023-07-20 ENCOUNTER — RX RENEWAL (OUTPATIENT)
Age: 63
End: 2023-07-20

## 2023-07-21 ENCOUNTER — RX RENEWAL (OUTPATIENT)
Age: 63
End: 2023-07-21

## 2023-08-08 NOTE — ED ADULT NURSE REASSESSMENT NOTE - COMFORT CARE
wait time explained/side rails up/plan of care explained
plan of care explained/side rails up/wait time explained
plan of care explained/side rails up/wait time explained/warm blanket provided
meal provided/plan of care explained/side rails up/wait time explained/warm blanket provided
No

## 2023-08-21 ENCOUNTER — OUTPATIENT (OUTPATIENT)
Dept: OUTPATIENT SERVICES | Facility: HOSPITAL | Age: 63
LOS: 1 days | End: 2023-08-21
Payer: SELF-PAY

## 2023-08-21 ENCOUNTER — RESULT REVIEW (OUTPATIENT)
Age: 63
End: 2023-08-21

## 2023-08-21 ENCOUNTER — APPOINTMENT (OUTPATIENT)
Dept: ULTRASOUND IMAGING | Facility: HOSPITAL | Age: 63
End: 2023-08-21
Payer: SELF-PAY

## 2023-08-21 DIAGNOSIS — D35.2 BENIGN NEOPLASM OF PITUITARY GLAND: Chronic | ICD-10-CM

## 2023-08-21 DIAGNOSIS — Z00.8 ENCOUNTER FOR OTHER GENERAL EXAMINATION: ICD-10-CM

## 2023-08-21 DIAGNOSIS — C50.911 MALIGNANT NEOPLASM OF UNSPECIFIED SITE OF RIGHT FEMALE BREAST: ICD-10-CM

## 2023-08-21 PROCEDURE — 76642 ULTRASOUND BREAST LIMITED: CPT

## 2023-08-21 PROCEDURE — 76642 ULTRASOUND BREAST LIMITED: CPT | Mod: 26,RT

## 2023-08-22 NOTE — PACU DISCHARGE NOTE - MENTAL STATUS: PATIENT PARTICIPATION
What Type Of Note Output Would You Prefer (Optional)?: Bullet Format Awake How Severe Are Your Spot(S)?: mild Have Your Spot(S) Been Treated In The Past?: has not been treated Hpi Title: Evaluation of Skin Lesions

## 2023-09-05 ENCOUNTER — APPOINTMENT (OUTPATIENT)
Age: 63
End: 2023-09-05
Payer: COMMERCIAL

## 2023-09-05 PROCEDURE — NTX: CUSTOM

## 2023-09-05 PROCEDURE — D9450: CPT

## 2023-09-13 ENCOUNTER — OUTPATIENT (OUTPATIENT)
Dept: OUTPATIENT SERVICES | Facility: HOSPITAL | Age: 63
LOS: 1 days | End: 2023-09-13
Payer: SELF-PAY

## 2023-09-13 ENCOUNTER — MED ADMIN CHARGE (OUTPATIENT)
Age: 63
End: 2023-09-13

## 2023-09-13 ENCOUNTER — APPOINTMENT (OUTPATIENT)
Dept: FAMILY MEDICINE | Facility: HOSPITAL | Age: 63
End: 2023-09-13

## 2023-09-13 VITALS
WEIGHT: 129 LBS | RESPIRATION RATE: 16 BRPM | OXYGEN SATURATION: 98 % | DIASTOLIC BLOOD PRESSURE: 75 MMHG | HEART RATE: 71 BPM | TEMPERATURE: 98.3 F | SYSTOLIC BLOOD PRESSURE: 115 MMHG | BODY MASS INDEX: 26.96 KG/M2

## 2023-09-13 DIAGNOSIS — E78.5 HYPERLIPIDEMIA, UNSPECIFIED: ICD-10-CM

## 2023-09-13 DIAGNOSIS — E78.6 HYPERLIPIDEMIA, UNSPECIFIED: ICD-10-CM

## 2023-09-13 DIAGNOSIS — D35.2 BENIGN NEOPLASM OF PITUITARY GLAND: Chronic | ICD-10-CM

## 2023-09-13 DIAGNOSIS — Z00.00 ENCOUNTER FOR GENERAL ADULT MEDICAL EXAMINATION WITHOUT ABNORMAL FINDINGS: ICD-10-CM

## 2023-09-13 DIAGNOSIS — Z00.00 ENCOUNTER FOR GENERAL ADULT MEDICAL EXAMINATION W/OUT ABNORMAL FINDINGS: ICD-10-CM

## 2023-09-16 PROCEDURE — 84443 ASSAY THYROID STIM HORMONE: CPT

## 2023-09-16 PROCEDURE — 36415 COLL VENOUS BLD VENIPUNCTURE: CPT

## 2023-09-16 PROCEDURE — 83036 HEMOGLOBIN GLYCOSYLATED A1C: CPT

## 2023-09-16 PROCEDURE — G0463: CPT

## 2023-09-17 PROBLEM — Z00.00 ENCOUNTER FOR ANNUAL PHYSICAL EXAM: Status: ACTIVE | Noted: 2023-09-17

## 2023-09-17 LAB
ESTIMATED AVERAGE GLUCOSE: 111 MG/DL
HBA1C MFR BLD HPLC: 5.5 %
TSH SERPL-ACNC: 1.35 UIU/ML

## 2023-09-19 ENCOUNTER — NON-APPOINTMENT (OUTPATIENT)
Age: 63
End: 2023-09-19

## 2023-09-20 ENCOUNTER — APPOINTMENT (OUTPATIENT)
Dept: FAMILY MEDICINE | Facility: HOSPITAL | Age: 63
End: 2023-09-20

## 2023-09-26 ENCOUNTER — OUTPATIENT (OUTPATIENT)
Dept: OUTPATIENT SERVICES | Facility: HOSPITAL | Age: 63
LOS: 1 days | End: 2023-09-26
Payer: SELF-PAY

## 2023-09-26 ENCOUNTER — APPOINTMENT (OUTPATIENT)
Dept: FAMILY MEDICINE | Facility: HOSPITAL | Age: 63
End: 2023-09-26

## 2023-09-26 VITALS
SYSTOLIC BLOOD PRESSURE: 102 MMHG | OXYGEN SATURATION: 99 % | TEMPERATURE: 97.8 F | HEART RATE: 100 BPM | DIASTOLIC BLOOD PRESSURE: 70 MMHG | WEIGHT: 133 LBS | RESPIRATION RATE: 17 BRPM | BODY MASS INDEX: 27.8 KG/M2

## 2023-09-26 DIAGNOSIS — D35.2 BENIGN NEOPLASM OF PITUITARY GLAND: Chronic | ICD-10-CM

## 2023-09-26 DIAGNOSIS — F41.9 ANXIETY DISORDER, UNSPECIFIED: ICD-10-CM

## 2023-09-26 DIAGNOSIS — H00.019 HORDEOLUM EXTERNUM UNSPECIFIED EYE, UNSPECIFIED EYELID: ICD-10-CM

## 2023-09-26 DIAGNOSIS — Z00.00 ENCOUNTER FOR GENERAL ADULT MEDICAL EXAMINATION WITHOUT ABNORMAL FINDINGS: ICD-10-CM

## 2023-09-26 PROCEDURE — G0463: CPT

## 2023-09-27 ENCOUNTER — APPOINTMENT (OUTPATIENT)
Dept: SURGICAL ONCOLOGY | Facility: CLINIC | Age: 63
End: 2023-09-27
Payer: COMMERCIAL

## 2023-09-27 VITALS
SYSTOLIC BLOOD PRESSURE: 127 MMHG | OXYGEN SATURATION: 98 % | BODY MASS INDEX: 27.92 KG/M2 | DIASTOLIC BLOOD PRESSURE: 83 MMHG | WEIGHT: 133 LBS | HEART RATE: 87 BPM | RESPIRATION RATE: 17 BRPM | HEIGHT: 58 IN

## 2023-09-27 PROCEDURE — 99215 OFFICE O/P EST HI 40 MIN: CPT

## 2023-10-01 PROBLEM — Z92.3 HISTORY OF RADIATION THERAPY: Status: RESOLVED | Noted: 2022-06-02 | Resolved: 2023-10-01

## 2023-10-09 ENCOUNTER — APPOINTMENT (OUTPATIENT)
Age: 63
End: 2023-10-09

## 2023-10-09 ENCOUNTER — APPOINTMENT (OUTPATIENT)
Dept: OPHTHALMOLOGY | Facility: CLINIC | Age: 63
End: 2023-10-09
Payer: COMMERCIAL

## 2023-10-09 ENCOUNTER — NON-APPOINTMENT (OUTPATIENT)
Age: 63
End: 2023-10-09

## 2023-10-09 PROCEDURE — 92134 CPTRZ OPH DX IMG PST SGM RTA: CPT

## 2023-10-09 PROCEDURE — 92004 COMPRE OPH EXAM NEW PT 1/>: CPT

## 2023-10-11 NOTE — END OF VISIT
Chronic Disease Management Services   Office Progress Note    Ruth Ann Cruz is a 67 year old year old female patient presenting for a follow-up visit for diabetes management.     Referring Provider: Bear Chin MD, last seen 6/30/2023     Supervising Provider: Fara Wong DO  Order Expiration Date: 6/29/2024     Today's visit: Patient has been experiencing some low blood sugars the past couple of days. Of note, changed patient’s low BG alarm on Dexcom, as it was alerting her overnight but patient was asymptomatic in the 80s.    Time of visit: 1:00pm - 1:33pm     Visit History:   10/11/2023 - decreased metformin to 500 mg PO BID  8/14/2023 - first Dexcom download, increased metformin XR to 1000 mg PO QAM and 500 mg PO QPM  7/26/2023 - initial CDM visit; increased Tresiba to 60 units SC QPM    PMH:  Arthritis   CAD   Cervicalgia   COVID-19 virus infection   Diabetes mellitus (CMD)   Essential (primary) hypertension   Gastroesophageal reflux disease   High Cholesterol   Liver Disease   Urticaria     ER/Hospitalization History:   Early July 2023 - blood clot in vein while on vacation in [place]     HPI:  General Symptoms:      (-) Palpitations  (-) HA: has been getting Botox to treat   (+) SOB: when being active    (+) Fatigue  (+) Dizziness: during exercise   (-) CP    DM Symptoms:   (-) Polyuria   (-) Polydipsia   (-) Polyphagia   (+) Blurry vision   (-) Numbness/tingling      Allergies/Intolerances:    Amoxicillin   Adhesive (environmental) - rash   Lisinopril - Cough     Current DM Medications:  Tresiba 60 units SC QPM   Metformin XR 1000 mg PO QAM and 500 mg PO QPM     Past DM Medications  Victoza (cost)     MEDS:  Current Outpatient Medications   Medication Instructions   • albuterol 108 (90 Base) MCG/ACT inhaler Use 2 inh every 4 hours prn wheezing   • apixaBAN (ELIQUIS) 5 mg, Oral, DAILY   • atorvastatin (LIPITOR) 80 mg, Oral, DAILY   • BD Pen Needle Susan U/F 32G X 4 MM Misc USE TO INJECT INSULIN 1 TIMES   DAILY . REMOVE NEEDLE COVER TO  EXPOSE NEEDLE BEFORE INJECTING   • Blood Glucose Monitoring Suppl (OneTouch Verio) w/Device Kit 1 Device, Does not apply, 2 TIMES DAILY, E11.65, Onetouch Verio   • Calcium Carbonate-Vit D-Min (CALCIUM 1200 PO) Take  by mouth. - Oral    • carvedilol (COREG) 3.125 mg, Oral, 2 TIMES DAILY WITH MEALS   • clopidogrel (PLAVIX) 75 mg, Oral, DAILY   • dexAMETHasone (DECADRON) 4 mg, Oral, DAILY   • ergocalciferol (DRISDOL) 24141 units capsule Oral   • Ferrous Sulfate (Iron) 325 (65 Fe) MG Tab Oral   • gabapentin (NEURONTIN) 300 mg, Oral, 3 TIMES DAILY   • Lancets (OneTouch Delica Plus [id number]) Misc USE AND DISCARD 1 LANCET TOTEST BLOOD SUGAR TWO TIMES A DAY   • metFORMIN (GLUCOPHAGE-XR) 500 MG 24 hr tablet Take two tablets (1000 mg total) by mouth every morning with breakfast and take one tablet (500 mg total) by mouth every evening with dinner.   • Multiple Vitamins-Minerals (OCUVITE PO) Take  by mouth.   - Oral    • nitroGLYCERIN (NITROSTAT) 0.4 MG sublingual tablet DISSOLVE 1 TABLET UNDER THE  TONGUE EVERY 5 MINUTES AS NEEDED FOR CHEST PAIN. MAX OF 3 TABLETS IN 15 MINUTES. CALL 911 IF PAIN  PERSISTS.   • omeprazole (PRILOSEC) 40 mg, Oral, DAILY   • OneTouch Verio test strip Test blood sugar 2 times daily. Diagnosis: E11.65. Meter: One Touch Verio   • prednisoLONE acetate (PRED FORTE) 1 % ophthalmic suspension 1 drop, Both Eyes, 4 TIMES DAILY   • Tresiba FlexTouch 60 Units, Subcutaneous, DAILY   • venlafaxine XR (EFFEXOR XR) 75 mg, Oral, DAILY   • vitamin C 500 mg, Oral   • zinc sulfate (ZINCATE) 220 mg, Oral     Medication Adherence:   · Medications reconciled per patient’s memory  · Taken medications today? Yes   · Missed doses of medications: No   · Patient uses a pillbox? Yes     Diet/Exercise:  • Diet  ? Breakfast: fruit, roasted cactus  ? Lunch: vegetables (steamed or raw with dressing), beans, boiled cactus; doesn't like meat very much  ? Dinner: about the same as lunch; avoids  [] : Resident [FreeTextEntry3] : Agree with assessment and plan as written and discussed with the resident.\par  rice, or will eat small portion; chicken, tuna  ? Snacks: coffee  ? Drinks: coffee (w/ cream and sometimes splenda), water, Crystal Lite (sugar-free)   Exercise: active around the house; does Vesna    Social History:  · Nicotine Use: Denies use  · EtOH:  Denies use     Routine Health Maintenance:  · Microalbumin/creatinine ratio: Last documented: 4/22/2023 up to date  · Dilated eye exam: Last documented: 4/18/2022 not up to date, due   · Foot exam: Last documented: 4/7/2021 not up to date; due  · Aspirin 81 mg daily for cardioprotection: not taking; on apixaban for blood clot in July 2023     · Statin: taking Atorvastatin 80 mg PO daily    · ACEI/ARB: not taking; on Carvedilol 3.125 mg PO BID    • Vaccinations:  ? Influenza: Last vaccination date: 10/4/2022; up to date  ? Pneumococcal: PPSV23 (7/13/2005, 7/10/2008, 1/15/2016, 10/22/2020); PCV 13 (10/21/2021); up to date  Depression screening: Last documented: 6/30/2023, up to date    Vitals:There were no vitals taken for this visit.      BP Readings from Last 3 Encounters:   07/25/23 122/60   07/24/23 138/74   07/19/23 115/70          Pulse Readings from Last 3 Encounters:   07/25/23 74   07/24/23 70   07/19/23 63          Wt Readings from Last 3 Encounters:   08/30/23 97.7 kg   07/25/23 95.3 kg   07/24/23 96.2 kg     CGM data:    LABS:  Component      Latest Ref Rng 4/22/2023  8:50 AM 7/28/2023  10:12 AM   Sodium      135 - 145 mmol/L 139     Potassium      3.4 - 5.1 mmol/L 4.3     Chloride      97 - 110 mmol/L 102     CO2      21 - 32 mmol/L 27     ANION GAP      7 - 19 mmol/L 14     Glucose      70 - 99 mg/dL 129 (H)     BUN      6 - 20 mg/dL 14     Creatinine      0.51 - 0.95 mg/dL 0.57     Glomerular Filtration Rate      >=60  >90     BUN/CREATININE RATIO      7 - 25  25     CALCIUM      8.4 - 10.2 mg/dL 8.8     TOTAL BILIRUBIN      0.2 - 1.0 mg/dL 0.3     AST/SGOT      <=37 Units/L 23     ALT/SGPT      <64 Units/L 36     ALK PHOSPHATASE      45 - 117 Units/L  68     Albumin      3.6 - 5.1 g/dL 3.5 (L)     TOTAL PROTEIN      6.4 - 8.2 g/dL 6.4     GLOBULIN      2.0 - 4.0 g/dL 2.9     A/G Ratio, Serum      1.0 - 2.4  1.2     CHOLESTEROL      <=199 mg/dL 161     TRIGLYCERIDE      <=149 mg/dL 212 (H)     HDL      >=50 mg/dL 48 (L)     CALCULATED LDL      <=129 mg/dL 71     CALCULATED NON HDL      mg/dL 113     CHOL/HDL      <=4.4  3.4     GLYCOHEMOGLOBIN A1C      4.5 - 5.6 %  8.0 (H)       Legend:  (H) High  (L) Low    The 10-year ASCVD risk score (Foster OLEARY, et al., 2019) is: 18.3%    Values used to calculate the score:      Age: 67 years      Sex: Female      Is Non- : No      Diabetic: Yes      Tobacco smoker: No      Systolic Blood Pressure: 122 mmHg      Is BP treated: Yes      HDL Cholesterol: 48 mg/dL      Total Cholesterol: 161 mg/dL    Assessment/Plan:  1)  Type 2 DM  Goals: A1c <7%, TIR >70%, TAR <25%, TBR <5%   · Patient's A1c is not goal   · AGP at goal   o TIR 76%  o TAR 23%  o TBR 0%  · Recommend to decrease metformin to 500 mg PO QAM and 500 mg PO QPM and continue Tresiba 60 units   · Patient is interested in medication that will assist with weight loss. Will send Ozempic prescription to pharmacy to check on pricing - will consider initiating if affordable. Patient agreeable.  · Repeat A1C every 6 months, CMP every 6 months     Pt was provided with an updated medication list with all current medications and instructions on how and what time to take.     Follow-up:   PharmD follow up: 11/8/2023  PCP follow up: not scheduled   Endocrinology follow up: not scheduled      Time Spent on Visit:  33 minutes were spent via face to face discussion related to the medical management of the patient.    Magda Hinson, PharmD Candidate 2024   Ksenia Randall, Kim    Patient Privacy Notice  The 21st Century Cures Act makes medical notes like these available to patients in the interest of transparency. Please be advised this is a medical document.  Medical documents are intended to carry relevant information and the clinical opinion of the practitioner. The medical note is used as communication between medical providers and may appear blunt or direct. The medical note is written in medical language and may contain abbreviations or verbiage that are unfamiliar.

## 2023-10-24 ENCOUNTER — OUTPATIENT (OUTPATIENT)
Dept: OUTPATIENT SERVICES | Facility: HOSPITAL | Age: 63
LOS: 1 days | Discharge: HOME | End: 2023-10-24

## 2023-10-24 DIAGNOSIS — D35.2 BENIGN NEOPLASM OF PITUITARY GLAND: Chronic | ICD-10-CM

## 2023-10-24 DIAGNOSIS — C50.919 MALIGNANT NEOPLASM OF UNSPECIFIED SITE OF UNSPECIFIED FEMALE BREAST: ICD-10-CM

## 2023-10-26 LAB
BASOPHILS # BLD AUTO: 0.02 K/UL
BASOPHILS NFR BLD AUTO: 0.6 %
EOSINOPHIL # BLD AUTO: 0.07 K/UL
EOSINOPHIL NFR BLD AUTO: 2.1 %
HCT VFR BLD CALC: 40.3 %
HGB BLD-MCNC: 13.3 G/DL
IMM GRANULOCYTES NFR BLD AUTO: 0.3 %
LYMPHOCYTES # BLD AUTO: 1.23 K/UL
LYMPHOCYTES NFR BLD AUTO: 36.7 %
MAN DIFF?: NORMAL
MCHC RBC-ENTMCNC: 28.9 PG
MCHC RBC-ENTMCNC: 33 GM/DL
MCV RBC AUTO: 87.6 FL
MONOCYTES # BLD AUTO: 0.27 K/UL
MONOCYTES NFR BLD AUTO: 8.1 %
NEUTROPHILS # BLD AUTO: 1.75 K/UL
NEUTROPHILS NFR BLD AUTO: 52.2 %
PLATELET # BLD AUTO: 283 K/UL
RBC # BLD: 4.6 M/UL
RBC # FLD: 13.3 %
WBC # FLD AUTO: 3.35 K/UL

## 2023-10-27 ENCOUNTER — APPOINTMENT (OUTPATIENT)
Dept: HEMATOLOGY ONCOLOGY | Facility: CLINIC | Age: 63
End: 2023-10-27
Payer: COMMERCIAL

## 2023-10-27 ENCOUNTER — APPOINTMENT (OUTPATIENT)
Dept: INFUSION THERAPY | Facility: HOSPITAL | Age: 63
End: 2023-10-27

## 2023-10-27 ENCOUNTER — RESULT REVIEW (OUTPATIENT)
Age: 63
End: 2023-10-27

## 2023-10-27 ENCOUNTER — LABORATORY RESULT (OUTPATIENT)
Age: 63
End: 2023-10-27

## 2023-10-27 VITALS
BODY MASS INDEX: 27.6 KG/M2 | TEMPERATURE: 97.9 F | SYSTOLIC BLOOD PRESSURE: 121 MMHG | HEART RATE: 78 BPM | DIASTOLIC BLOOD PRESSURE: 75 MMHG | RESPIRATION RATE: 16 BRPM | OXYGEN SATURATION: 99 % | WEIGHT: 132.06 LBS

## 2023-10-27 DIAGNOSIS — E83.52 HYPERCALCEMIA: ICD-10-CM

## 2023-10-27 DIAGNOSIS — C79.51 SECONDARY MALIGNANT NEOPLASM OF BONE: ICD-10-CM

## 2023-10-27 PROCEDURE — 99214 OFFICE O/P EST MOD 30 MIN: CPT

## 2023-10-31 ENCOUNTER — APPOINTMENT (OUTPATIENT)
Dept: GASTROENTEROLOGY | Facility: HOSPITAL | Age: 63
End: 2023-10-31
Payer: COMMERCIAL

## 2023-10-31 ENCOUNTER — OUTPATIENT (OUTPATIENT)
Dept: OUTPATIENT SERVICES | Facility: HOSPITAL | Age: 63
LOS: 1 days | End: 2023-10-31
Payer: SELF-PAY

## 2023-10-31 VITALS
TEMPERATURE: 97.1 F | WEIGHT: 130 LBS | HEART RATE: 80 BPM | RESPIRATION RATE: 16 BRPM | DIASTOLIC BLOOD PRESSURE: 86 MMHG | BODY MASS INDEX: 27.29 KG/M2 | SYSTOLIC BLOOD PRESSURE: 133 MMHG | HEIGHT: 58 IN

## 2023-10-31 DIAGNOSIS — K76.0 FATTY (CHANGE OF) LIVER, NOT ELSEWHERE CLASSIFIED: ICD-10-CM

## 2023-10-31 DIAGNOSIS — D35.2 BENIGN NEOPLASM OF PITUITARY GLAND: Chronic | ICD-10-CM

## 2023-10-31 DIAGNOSIS — R10.9 UNSPECIFIED ABDOMINAL PAIN: ICD-10-CM

## 2023-10-31 PROCEDURE — G0463: CPT

## 2023-10-31 PROCEDURE — ZZZZZ: CPT | Mod: GC

## 2023-11-01 DIAGNOSIS — K31.A0 GASTRIC INTESTINAL METAPLASIA, UNSPECIFIED: ICD-10-CM

## 2023-11-01 DIAGNOSIS — D12.6 BENIGN NEOPLASM OF COLON, UNSPECIFIED: ICD-10-CM

## 2023-11-01 DIAGNOSIS — K76.0 FATTY (CHANGE OF) LIVER, NOT ELSEWHERE CLASSIFIED: ICD-10-CM

## 2023-11-01 DIAGNOSIS — K57.32 DIVERTICULITIS OF LARGE INTESTINE WITHOUT PERFORATION OR ABSCESS WITHOUT BLEEDING: ICD-10-CM

## 2023-11-01 DIAGNOSIS — Z86.19 PERSONAL HISTORY OF OTHER INFECTIOUS AND PARASITIC DISEASES: ICD-10-CM

## 2023-11-09 ENCOUNTER — OUTPATIENT (OUTPATIENT)
Dept: OUTPATIENT SERVICES | Facility: HOSPITAL | Age: 63
LOS: 1 days | End: 2023-11-09
Payer: SELF-PAY

## 2023-11-09 ENCOUNTER — TRANSCRIPTION ENCOUNTER (OUTPATIENT)
Age: 63
End: 2023-11-09

## 2023-11-09 ENCOUNTER — RESULT REVIEW (OUTPATIENT)
Age: 63
End: 2023-11-09

## 2023-11-09 VITALS
SYSTOLIC BLOOD PRESSURE: 121 MMHG | DIASTOLIC BLOOD PRESSURE: 61 MMHG | HEART RATE: 68 BPM | RESPIRATION RATE: 16 BRPM | OXYGEN SATURATION: 100 %

## 2023-11-09 VITALS
TEMPERATURE: 98 F | WEIGHT: 130.07 LBS | RESPIRATION RATE: 17 BRPM | HEART RATE: 85 BPM | HEIGHT: 62 IN | OXYGEN SATURATION: 100 % | DIASTOLIC BLOOD PRESSURE: 68 MMHG | SYSTOLIC BLOOD PRESSURE: 118 MMHG

## 2023-11-09 DIAGNOSIS — Z86.19 PERSONAL HISTORY OF OTHER INFECTIOUS AND PARASITIC DISEASES: ICD-10-CM

## 2023-11-09 DIAGNOSIS — D35.2 BENIGN NEOPLASM OF PITUITARY GLAND: Chronic | ICD-10-CM

## 2023-11-09 DIAGNOSIS — D12.6 BENIGN NEOPLASM OF COLON, UNSPECIFIED: ICD-10-CM

## 2023-11-09 DIAGNOSIS — K31.A0 GASTRIC INTESTINAL METAPLASIA, UNSPECIFIED: ICD-10-CM

## 2023-11-09 PROCEDURE — 45380 COLONOSCOPY AND BIOPSY: CPT | Mod: GC,59

## 2023-11-09 PROCEDURE — 45380 COLONOSCOPY AND BIOPSY: CPT | Mod: XS,PT

## 2023-11-09 PROCEDURE — 43239 EGD BIOPSY SINGLE/MULTIPLE: CPT | Mod: GC

## 2023-11-09 PROCEDURE — 88305 TISSUE EXAM BY PATHOLOGIST: CPT

## 2023-11-09 PROCEDURE — 88305 TISSUE EXAM BY PATHOLOGIST: CPT | Mod: 26

## 2023-11-09 PROCEDURE — 45385 COLONOSCOPY W/LESION REMOVAL: CPT | Mod: GC

## 2023-11-09 PROCEDURE — 43239 EGD BIOPSY SINGLE/MULTIPLE: CPT

## 2023-11-09 PROCEDURE — 45385 COLONOSCOPY W/LESION REMOVAL: CPT | Mod: PT

## 2023-11-09 RX ORDER — CHOLECALCIFEROL (VITAMIN D3) 125 MCG
0 CAPSULE ORAL
Qty: 0 | Refills: 0 | DISCHARGE

## 2023-11-09 RX ORDER — ANASTROZOLE 1 MG/1
1 TABLET ORAL
Refills: 0 | DISCHARGE

## 2023-11-09 NOTE — PRE PROCEDURE NOTE - PRE PROCEDURE EVALUATION
Attending Physician:    Susu                        Procedure: EGD/colonoscopy     Indication for Procedure: gastric mappping, CRC surveillance  ________________________________________________________  PAST MEDICAL & SURGICAL HISTORY:  Hypothyroid  no meds      Diverticulitis      Right upper quadrant abdominal pain      Breast cancer  right dx 2020      Benign tumor of pituitary gland  removed        ALLERGIES:  OxyContin (Unknown)  morphine ([place]; Rash)    HOME MEDICATIONS:  Vitamin D3 2000 intl units (50 mcg) oral tablet:     AICD/PPM: [ ] yes   [x] no    PERTINENT LAB DATA:                      PHYSICAL EXAMINATION:    T(C): --  HR: --  BP: --  RR: --  SpO2: --    Constitutional: NAD    Respiratory: CTAB/L  Cardiovascular: S1 and S2  Gastrointestinal: BS+, soft, NT/ND  Extremities: No peripheral edema  Neurological: A/O x 3, no focal deficits        COMMENTS:    The patient is a suitable candidate for the planned procedure unless box checked [ ]  No, explain:

## 2023-11-09 NOTE — ASU PATIENT PROFILE, ADULT - IS PATIENT PREGNANT?
no Paramedian Forehead Flap Division And Inset Text: Division and inset of the paramedian forehead flap was performed to achieve optimal aesthetic result, restore normal anatomic appearance and avoid distortion of normal anatomy, expedite and facilitate wound healing, achieve optimal functional result and because linear closure either not possible or would produce suboptimal result. The patient was prepped and draped in the usual manner. The pedicle was infiltrated with local anesthesia. The pedicle was sectioned with a #15 blade. The pedicle was de-bulked and trimmed to match the shape of the defect. Hemostasis was achieved. The flap donor site and free margin of the flap were secured with deep buried sutures and the wound edges were re-approximated.

## 2023-11-09 NOTE — ASU DISCHARGE PLAN (ADULT/PEDIATRIC) - NS MD DC FALL RISK RISK
For information on Fall & Injury Prevention, visit: [email or web address] OR  [email or web address] OR  https://www.cdc.gov/steadi/patient.html

## 2023-11-17 LAB
SURGICAL PATHOLOGY STUDY: SIGNIFICANT CHANGE UP
SURGICAL PATHOLOGY STUDY: SIGNIFICANT CHANGE UP

## 2023-11-21 ENCOUNTER — APPOINTMENT (OUTPATIENT)
Dept: GASTROENTEROLOGY | Facility: HOSPITAL | Age: 63
End: 2023-11-21
Payer: SELF-PAY

## 2023-11-21 ENCOUNTER — OUTPATIENT (OUTPATIENT)
Dept: OUTPATIENT SERVICES | Facility: HOSPITAL | Age: 63
LOS: 1 days | End: 2023-11-21
Payer: SELF-PAY

## 2023-11-21 VITALS — HEART RATE: 86 BPM | TEMPERATURE: 96 F | SYSTOLIC BLOOD PRESSURE: 119 MMHG | DIASTOLIC BLOOD PRESSURE: 83 MMHG

## 2023-11-21 DIAGNOSIS — Z86.19 PERSONAL HISTORY OF OTHER INFECTIOUS AND PARASITIC DISEASES: ICD-10-CM

## 2023-11-21 DIAGNOSIS — K57.32 DIVERTICULITIS OF LARGE INTESTINE W/OUT PERFORATION OR ABSCESS W/OUT BLEEDING: ICD-10-CM

## 2023-11-21 DIAGNOSIS — R10.9 UNSPECIFIED ABDOMINAL PAIN: ICD-10-CM

## 2023-11-21 DIAGNOSIS — D35.2 BENIGN NEOPLASM OF PITUITARY GLAND: Chronic | ICD-10-CM

## 2023-11-21 DIAGNOSIS — K31.A0 GASTRIC INTESTINAL METAPLASIA, UNSPECIFIED: ICD-10-CM

## 2023-11-21 DIAGNOSIS — D12.6 BENIGN NEOPLASM OF COLON, UNSPECIFIED: ICD-10-CM

## 2023-11-21 DIAGNOSIS — K57.32 DIVERTICULITIS OF LARGE INTESTINE WITHOUT PERFORATION OR ABSCESS WITHOUT BLEEDING: ICD-10-CM

## 2023-11-21 PROCEDURE — ZZZZZ: CPT | Mod: GC

## 2023-11-21 PROCEDURE — G0463: CPT

## 2023-11-30 ENCOUNTER — MED ADMIN CHARGE (OUTPATIENT)
Age: 63
End: 2023-11-30

## 2023-11-30 ENCOUNTER — OUTPATIENT (OUTPATIENT)
Dept: OUTPATIENT SERVICES | Facility: HOSPITAL | Age: 63
LOS: 1 days | End: 2023-11-30
Payer: SELF-PAY

## 2023-11-30 ENCOUNTER — APPOINTMENT (OUTPATIENT)
Dept: FAMILY MEDICINE | Facility: HOSPITAL | Age: 63
End: 2023-11-30

## 2023-11-30 DIAGNOSIS — Z23 ENCOUNTER FOR IMMUNIZATION: ICD-10-CM

## 2023-11-30 DIAGNOSIS — Z00.00 ENCOUNTER FOR GENERAL ADULT MEDICAL EXAMINATION WITHOUT ABNORMAL FINDINGS: ICD-10-CM

## 2023-11-30 DIAGNOSIS — D35.2 BENIGN NEOPLASM OF PITUITARY GLAND: Chronic | ICD-10-CM

## 2023-11-30 DIAGNOSIS — M85.80 OTHER SPECIFIED DISORDERS OF BONE DENSITY AND STRUCTURE, UNSPECIFIED SITE: ICD-10-CM

## 2023-11-30 PROCEDURE — G0463: CPT

## 2023-12-02 PROBLEM — Z23 ENCOUNTER FOR IMMUNIZATION: Status: ACTIVE | Noted: 2021-10-28

## 2023-12-05 ENCOUNTER — APPOINTMENT (OUTPATIENT)
Age: 63
End: 2023-12-05
Payer: COMMERCIAL

## 2023-12-05 PROCEDURE — NTX: CUSTOM

## 2024-02-05 ENCOUNTER — APPOINTMENT (OUTPATIENT)
Dept: MAMMOGRAPHY | Facility: HOSPITAL | Age: 64
End: 2024-02-05
Payer: SELF-PAY

## 2024-02-05 ENCOUNTER — OUTPATIENT (OUTPATIENT)
Dept: OUTPATIENT SERVICES | Facility: HOSPITAL | Age: 64
LOS: 1 days | End: 2024-02-05
Payer: SELF-PAY

## 2024-02-05 ENCOUNTER — RESULT REVIEW (OUTPATIENT)
Age: 64
End: 2024-02-05

## 2024-02-05 ENCOUNTER — APPOINTMENT (OUTPATIENT)
Dept: ULTRASOUND IMAGING | Facility: HOSPITAL | Age: 64
End: 2024-02-05
Payer: SELF-PAY

## 2024-02-05 DIAGNOSIS — D35.2 BENIGN NEOPLASM OF PITUITARY GLAND: Chronic | ICD-10-CM

## 2024-02-05 DIAGNOSIS — C50.911 MALIGNANT NEOPLASM OF UNSPECIFIED SITE OF RIGHT FEMALE BREAST: ICD-10-CM

## 2024-02-05 PROCEDURE — 77066 DX MAMMO INCL CAD BI: CPT | Mod: 26

## 2024-02-05 PROCEDURE — G0279: CPT | Mod: 26

## 2024-02-05 PROCEDURE — 76641 ULTRASOUND BREAST COMPLETE: CPT

## 2024-02-05 PROCEDURE — 77066 DX MAMMO INCL CAD BI: CPT

## 2024-02-05 PROCEDURE — 76641 ULTRASOUND BREAST COMPLETE: CPT | Mod: 26,50

## 2024-02-05 PROCEDURE — G0279: CPT

## 2024-02-26 ENCOUNTER — APPOINTMENT (OUTPATIENT)
Age: 64
End: 2024-02-26

## 2024-03-25 NOTE — H&P ADULT - NSHPLANGLIMITEDENGLISH_GEN_A_CORE
Alesia Ventura  is requesting a refill authorization.  Brief Assessment and Rationale for Refill:  Approve     Medication Therapy Plan:         Comments:     Note composed:5:22 AM 03/25/2024            No

## 2024-04-02 ENCOUNTER — APPOINTMENT (OUTPATIENT)
Age: 64
End: 2024-04-02
Payer: COMMERCIAL

## 2024-04-02 PROCEDURE — D0274: CPT

## 2024-04-02 PROCEDURE — D1110 PROPHYLAXIS - ADULT: CPT

## 2024-04-02 PROCEDURE — D0120: CPT

## 2024-04-15 ENCOUNTER — OUTPATIENT (OUTPATIENT)
Dept: OUTPATIENT SERVICES | Facility: HOSPITAL | Age: 64
LOS: 1 days | Discharge: HOME | End: 2024-04-15

## 2024-04-15 DIAGNOSIS — C50.919 MALIGNANT NEOPLASM OF UNSPECIFIED SITE OF UNSPECIFIED FEMALE BREAST: ICD-10-CM

## 2024-04-15 DIAGNOSIS — D35.2 BENIGN NEOPLASM OF PITUITARY GLAND: Chronic | ICD-10-CM

## 2024-04-16 ENCOUNTER — APPOINTMENT (OUTPATIENT)
Age: 64
End: 2024-04-16

## 2024-04-18 ENCOUNTER — APPOINTMENT (OUTPATIENT)
Age: 64
End: 2024-04-18
Payer: SELF-PAY

## 2024-04-18 LAB
ALBUMIN SERPL ELPH-MCNC: 4.3 G/DL
ALP BLD-CCNC: 68 U/L
ALT SERPL-CCNC: 16 U/L
ANION GAP SERPL CALC-SCNC: 11 MMOL/L
AST SERPL-CCNC: 19 U/L
BILIRUB SERPL-MCNC: 0.4 MG/DL
BUN SERPL-MCNC: 12 MG/DL
CALCIUM SERPL-MCNC: 9.9 MG/DL
CHLORIDE SERPL-SCNC: 104 MMOL/L
CO2 SERPL-SCNC: 26 MMOL/L
CREAT SERPL-MCNC: 0.66 MG/DL
EGFR: 97 ML/MIN/1.73M2
GLUCOSE SERPL-MCNC: 100 MG/DL
POTASSIUM SERPL-SCNC: 5 MMOL/L
PROT SERPL-MCNC: 6.8 G/DL
SODIUM SERPL-SCNC: 142 MMOL/L

## 2024-04-18 PROCEDURE — NTX: CUSTOM

## 2024-04-19 ENCOUNTER — APPOINTMENT (OUTPATIENT)
Dept: HEMATOLOGY ONCOLOGY | Facility: CLINIC | Age: 64
End: 2024-04-19
Payer: MEDICAID

## 2024-04-19 ENCOUNTER — APPOINTMENT (OUTPATIENT)
Dept: INFUSION THERAPY | Facility: HOSPITAL | Age: 64
End: 2024-04-19

## 2024-04-19 DIAGNOSIS — C50.911 MALIGNANT NEOPLASM OF UNSPECIFIED SITE OF RIGHT FEMALE BREAST: ICD-10-CM

## 2024-04-19 PROCEDURE — 99214 OFFICE O/P EST MOD 30 MIN: CPT

## 2024-04-19 RX ORDER — ERYTHROMYCIN 5 MG/G
5 OINTMENT OPHTHALMIC
Qty: 1 | Refills: 0 | Status: DISCONTINUED | COMMUNITY
Start: 2023-09-26 | End: 2024-04-19

## 2024-04-19 RX ORDER — ELECTROLYTES/DEXTROSE
SOLUTION, ORAL ORAL
Refills: 0 | Status: DISCONTINUED | COMMUNITY
End: 2024-04-19

## 2024-04-19 RX ORDER — POLYETHYLENE GLYCOL 3350 AND ELECTROLYTES WITH LEMON FLAVOR 236; 22.74; 6.74; 5.86; 2.97 G/4L; G/4L; G/4L; G/4L; G/4L
236 POWDER, FOR SOLUTION ORAL
Qty: 1 | Refills: 0 | Status: DISCONTINUED | COMMUNITY
Start: 2023-10-31 | End: 2024-04-19

## 2024-04-19 RX ORDER — ANASTROZOLE TABLETS 1 MG/1
1 TABLET ORAL
Qty: 90 | Refills: 1 | Status: ACTIVE | COMMUNITY
Start: 2021-02-11 | End: 1900-01-01

## 2024-04-21 PROBLEM — C50.911 BREAST CANCER, RIGHT: Status: ACTIVE | Noted: 2021-10-28

## 2024-04-21 NOTE — HISTORY OF PRESENT ILLNESS
[de-identified] : 63F originally from [facility], [place], [place]- speaking mostly, with PMHx of hyperlipidemia, hepatosteatosis, depression, osteopenia, reflux esophagitis, returning for medical oncologic follow-up of right breast invasive ductal carcinoma diagnosed November 2020.  CASE SYNOPSIS: 12/17/2020- MRI bilateral breast: Right breast-susceptibility artifact consistent with biopsy marker right lateral breast; associated non-mass enhancement particularly posteriorly spanning approximately 1.6 cm for which wide surgical excision is recommended. Left breast scattered enhancing nonspecific foci within the central left breast there is a 6 mm linear area of enhancement.  MRI biopsy with be recommended. 12/31/2020: MRI core guided biopsy; features compatible with fibroadenoma and detached fragment of papillary tissue. 1/19/2021: Bilateral lumpectomy under 70  localization, right axillary sentinel lymph lymph node biopsy; surgical pathology consistent with benign left breast changes.  Right breast 0.2 cm invasive ductal carcinoma and DCIS with 1 - right axillary sentinel lymph node and negative margins (T1a,N0) 2/17/2021: DEXA scan: Osteopenia in the lumbar spine and left forearm; normal bone density in the left hip 2/26-3/26/2021: 4240 cGy (16 fractions)+ 1000 cGy boost right breast March 2021: Start  anastrozole 12/24/2021: US breast/mammogram-no mammographic or sonographic evidence of malignancy. 4/18/22- start Xgeva 2/7/2023 right breast US Limited-9 mm cyst in the right breast 1 o'clock position, N 6 cm without significant change from prior study dated 12/27/2022, likely representing a simple cyst.  To ensure stability, a targeted 6-month right breast US is recommended. 8/21/2023 right breast US: Stable 8 mm cyst , 1 o'clock position, N 6 cm.  Continue 6-month sonographic follow-up examination to ensure stability.  BI-RADS 3 2/5/2024 mammogram- IMPRESSION: No mammographic evidence of malignancy. Stable sonographic findings are noted in the right and left breast as noted above.  [de-identified] : Invasive ductal carcinoma + DCIS [de-identified] : ER+/NH+, HER 2 nestor negative [FreeTextEntry1] : Anastrozole [de-identified] : Returning for biannual oncologic follow-up.  She reports no new constitutional symptoms.  Her mammogram from 2/5/2024 showed no evidence of recurrent malignancy.  Reports no new constitutional symptoms; last DEXA scan performed in May 2023 showed osteopenia.  Here for [place].  Denies hospitalization, new medications, recent infections.  Overall states her quality of life is good.

## 2024-04-21 NOTE — PHYSICAL EXAM
[de-identified] : small, firm cyst-like nodule left inner lower quadrant, not adherent on adjacent planes; bilateral lumpectomy scars unchanged. [de-identified] : status post right axillary sentinel lymph node biopsy

## 2024-04-21 NOTE — REASON FOR VISIT
[FreeTextEntry2] : right breast invasive ductal carcinoma [Interpreters_IDNumber] : [id number] [Interpreters_FullName] : Delia [FreeTextEntry3] : "[employer]" -  services.\par   [TWNoteComboBox1] : [place]

## 2024-04-21 NOTE — ASSESSMENT
[FreeTextEntry1] : Ms. WILKINSON 's questions were answered to her satisfaction.  She  expressed her  understanding and willingness to comply with the above recommendations, and  will return to the office in 6 months.

## 2024-04-22 DIAGNOSIS — C79.51 SECONDARY MALIGNANT NEOPLASM OF BONE: ICD-10-CM

## 2024-04-22 RX ORDER — B-COMPLEX WITH VITAMIN C
500-5 TABLET ORAL DAILY
Qty: 30 | Refills: 1 | Status: ACTIVE | COMMUNITY
Start: 2023-11-30 | End: 1900-01-01

## 2024-05-01 ENCOUNTER — APPOINTMENT (OUTPATIENT)
Age: 64
End: 2024-05-01

## 2024-06-11 ENCOUNTER — APPOINTMENT (OUTPATIENT)
Age: 64
End: 2024-06-11
Payer: COMMERCIAL

## 2024-06-11 PROCEDURE — D2330: CPT

## 2024-06-29 ENCOUNTER — APPOINTMENT (OUTPATIENT)
Dept: FAMILY MEDICINE | Facility: HOSPITAL | Age: 64
End: 2024-06-29

## 2024-06-29 ENCOUNTER — OUTPATIENT (OUTPATIENT)
Dept: OUTPATIENT SERVICES | Facility: HOSPITAL | Age: 64
LOS: 1 days | End: 2024-06-29
Payer: SELF-PAY

## 2024-06-29 VITALS
HEIGHT: 58 IN | SYSTOLIC BLOOD PRESSURE: 106 MMHG | DIASTOLIC BLOOD PRESSURE: 68 MMHG | OXYGEN SATURATION: 98 % | TEMPERATURE: 98.6 F | WEIGHT: 132 LBS | RESPIRATION RATE: 16 BRPM | BODY MASS INDEX: 27.71 KG/M2 | HEART RATE: 78 BPM

## 2024-06-29 DIAGNOSIS — D12.6 BENIGN NEOPLASM OF COLON, UNSPECIFIED: ICD-10-CM

## 2024-06-29 DIAGNOSIS — R10.9 UNSPECIFIED ABDOMINAL PAIN: ICD-10-CM

## 2024-06-29 DIAGNOSIS — D35.2 BENIGN NEOPLASM OF PITUITARY GLAND: Chronic | ICD-10-CM

## 2024-06-29 DIAGNOSIS — Z12.4 ENCOUNTER FOR SCREENING FOR MALIGNANT NEOPLASM OF CERVIX: ICD-10-CM

## 2024-06-29 DIAGNOSIS — Z00.00 ENCOUNTER FOR GENERAL ADULT MEDICAL EXAMINATION WITHOUT ABNORMAL FINDINGS: ICD-10-CM

## 2024-06-29 PROCEDURE — 87046 STOOL CULTR AEROBIC BACT EA: CPT

## 2024-06-29 PROCEDURE — 87338 HPYLORI STOOL AG IA: CPT

## 2024-06-29 PROCEDURE — 87177 OVA AND PARASITES SMEARS: CPT

## 2024-06-29 PROCEDURE — 87045 FECES CULTURE AEROBIC BACT: CPT

## 2024-06-29 PROCEDURE — G0463: CPT

## 2024-06-29 PROCEDURE — 83993 ASSAY FOR CALPROTECTIN FECAL: CPT

## 2024-07-01 PROBLEM — D12.6 POLYP OF COLON, ADENOMATOUS: Status: ACTIVE | Noted: 2023-11-21

## 2024-07-01 PROBLEM — Z12.4 CERVICAL CANCER SCREENING: Status: ACTIVE | Noted: 2024-07-01

## 2024-07-01 PROBLEM — R10.9 PAIN, ABDOMINAL: Status: ACTIVE | Noted: 2024-06-29

## 2024-07-01 LAB — DEPRECATED O AND P PREP STL: NORMAL

## 2024-07-02 LAB
BACTERIA STL CULT: NORMAL
H PYLORI AG STL QL: NEGATIVE

## 2024-07-16 ENCOUNTER — OUTPATIENT (OUTPATIENT)
Dept: OUTPATIENT SERVICES | Facility: HOSPITAL | Age: 64
LOS: 1 days | End: 2024-07-16
Payer: SELF-PAY

## 2024-07-16 ENCOUNTER — RESULT REVIEW (OUTPATIENT)
Age: 64
End: 2024-07-16

## 2024-07-16 DIAGNOSIS — R10.9 UNSPECIFIED ABDOMINAL PAIN: ICD-10-CM

## 2024-07-16 DIAGNOSIS — D35.2 BENIGN NEOPLASM OF PITUITARY GLAND: Chronic | ICD-10-CM

## 2024-07-16 DIAGNOSIS — Z00.00 ENCOUNTER FOR GENERAL ADULT MEDICAL EXAMINATION WITHOUT ABNORMAL FINDINGS: ICD-10-CM

## 2024-07-16 PROCEDURE — 76700 US EXAM ABDOM COMPLETE: CPT

## 2024-07-16 PROCEDURE — 76700 US EXAM ABDOM COMPLETE: CPT | Mod: 26

## 2024-09-04 ENCOUNTER — MED ADMIN CHARGE (OUTPATIENT)
Age: 64
End: 2024-09-04

## 2024-09-04 ENCOUNTER — OUTPATIENT (OUTPATIENT)
Dept: OUTPATIENT SERVICES | Facility: HOSPITAL | Age: 64
LOS: 1 days | End: 2024-09-04
Payer: MEDICAID

## 2024-09-04 ENCOUNTER — APPOINTMENT (OUTPATIENT)
Dept: FAMILY MEDICINE | Facility: HOSPITAL | Age: 64
End: 2024-09-04

## 2024-09-04 VITALS
DIASTOLIC BLOOD PRESSURE: 74 MMHG | RESPIRATION RATE: 16 BRPM | HEART RATE: 85 BPM | OXYGEN SATURATION: 97 % | BODY MASS INDEX: 28.13 KG/M2 | HEIGHT: 58 IN | SYSTOLIC BLOOD PRESSURE: 114 MMHG | WEIGHT: 134 LBS

## 2024-09-04 DIAGNOSIS — R10.813 RIGHT LOWER QUADRANT ABDOMINAL TENDERNESS: ICD-10-CM

## 2024-09-04 DIAGNOSIS — E78.5 HYPERLIPIDEMIA, UNSPECIFIED: ICD-10-CM

## 2024-09-04 DIAGNOSIS — R10.9 UNSPECIFIED ABDOMINAL PAIN: ICD-10-CM

## 2024-09-04 DIAGNOSIS — L72.3 SEBACEOUS CYST: ICD-10-CM

## 2024-09-04 DIAGNOSIS — E78.6 HYPERLIPIDEMIA, UNSPECIFIED: ICD-10-CM

## 2024-09-04 DIAGNOSIS — N28.1 CYST OF KIDNEY, ACQUIRED: ICD-10-CM

## 2024-09-04 DIAGNOSIS — Z00.00 ENCOUNTER FOR GENERAL ADULT MEDICAL EXAMINATION WITHOUT ABNORMAL FINDINGS: ICD-10-CM

## 2024-09-04 DIAGNOSIS — D35.2 BENIGN NEOPLASM OF PITUITARY GLAND: Chronic | ICD-10-CM

## 2024-09-04 RX ORDER — ATORVASTATIN CALCIUM 10 MG/1
10 TABLET, FILM COATED ORAL DAILY
Qty: 90 | Refills: 0 | Status: ACTIVE | COMMUNITY
Start: 2024-09-04 | End: 1900-01-01

## 2024-09-07 NOTE — PHYSICAL EXAM
[Normal] : normal rate, regular rhythm, normal S1 and S2 and no murmur heard [No Edema] : there was no peripheral edema [Soft] : abdomen soft [Non-distended] : non-distended [No Masses] : no abdominal mass palpated [de-identified] : RLQ TTP [de-identified] : firm tender subcutaneous mass (~2 cm) on right abdomen

## 2024-09-07 NOTE — PHYSICAL EXAM
[Normal] : normal rate, regular rhythm, normal S1 and S2 and no murmur heard [No Edema] : there was no peripheral edema [Soft] : abdomen soft [Non-distended] : non-distended [No Masses] : no abdominal mass palpated [de-identified] : RLQ TTP [de-identified] : firm tender subcutaneous mass (~2 cm) on right abdomen

## 2024-09-07 NOTE — HISTORY OF PRESENT ILLNESS
[FreeTextEntry1] : FOllow up [de-identified] : 63 year old female with PMH of hyperlipidemia, right breast invasive ductal carcinoma diagnosed November 2020 (managed by H/O- held anastrazole and rec Br US), 8mm Br cyst, hepatosteatosis, depression, osteopenia, reflux esophagitis presenting for dull 8/10 RLQ abdominal pain. Report that it is tender even when she pushes into it. No nausea of vomiting. No change in diet recently. No blood in stool or vomit. Denies that it is watery diarrhea. Also reports a painful mass on the right of her abdomen  that has grown in size over the last few months.

## 2024-09-07 NOTE — INTERPRETER SERVICES
[Patient Declined  Services] : - None: Patient declined  services [FreeTextEntry3] :  Shared Language Lao with MD

## 2024-09-07 NOTE — INTERPRETER SERVICES
[Patient Declined  Services] : - None: Patient declined  services [FreeTextEntry3] :  Shared Language Swiss with MD

## 2024-09-07 NOTE — INTERPRETER SERVICES
[Patient Declined  Services] : - None: Patient declined  services [FreeTextEntry3] :  Shared Language German with MD

## 2024-09-07 NOTE — HISTORY OF PRESENT ILLNESS
[FreeTextEntry1] : FOllow up [de-identified] : 63 year old female with PMH of hyperlipidemia, right breast invasive ductal carcinoma diagnosed November 2020 (managed by H/O- held anastrazole and rec Br US), 8mm Br cyst, hepatosteatosis, depression, osteopenia, reflux esophagitis presenting for dull 8/10 RLQ abdominal pain. Report that it is tender even when she pushes into it. No nausea of vomiting. No change in diet recently. No blood in stool or vomit. Denies that it is watery diarrhea. Also reports a painful mass on the right of her abdomen  that has grown in size over the last few months.

## 2024-09-07 NOTE — PHYSICAL EXAM
[Normal] : normal rate, regular rhythm, normal S1 and S2 and no murmur heard [No Edema] : there was no peripheral edema [Soft] : abdomen soft [Non-distended] : non-distended [No Masses] : no abdominal mass palpated [de-identified] : RLQ TTP [de-identified] : firm tender subcutaneous mass (~2 cm) on right abdomen

## 2024-09-07 NOTE — REVIEW OF SYSTEMS
[Abdominal Pain] : abdominal pain [Negative] : Respiratory [Nausea] : no nausea [Constipation] : no constipation [Diarrhea] : diarrhea [Vomiting] : no vomiting [Melena] : no melena [Dysuria] : no dysuria [Hematuria] : no hematuria [Frequency] : no frequency

## 2024-09-07 NOTE — HISTORY OF PRESENT ILLNESS
[FreeTextEntry1] : FOllow up [de-identified] : 63 year old female with PMH of hyperlipidemia, right breast invasive ductal carcinoma diagnosed November 2020 (managed by H/O- held anastrazole and rec Br US), 8mm Br cyst, hepatosteatosis, depression, osteopenia, reflux esophagitis presenting for dull 8/10 RLQ abdominal pain. Report that it is tender even when she pushes into it. No nausea of vomiting. No change in diet recently. No blood in stool or vomit. Denies that it is watery diarrhea. Also reports a painful mass on the right of her abdomen  that has grown in size over the last few months.

## 2024-09-12 ENCOUNTER — NON-APPOINTMENT (OUTPATIENT)
Age: 64
End: 2024-09-12

## 2024-09-12 ENCOUNTER — RESULT REVIEW (OUTPATIENT)
Age: 64
End: 2024-09-12

## 2024-09-12 ENCOUNTER — OUTPATIENT (OUTPATIENT)
Dept: OUTPATIENT SERVICES | Facility: HOSPITAL | Age: 64
LOS: 1 days | End: 2024-09-12
Payer: MEDICAID

## 2024-09-12 DIAGNOSIS — R10.813 RIGHT LOWER QUADRANT ABDOMINAL TENDERNESS: ICD-10-CM

## 2024-09-12 DIAGNOSIS — Z00.00 ENCOUNTER FOR GENERAL ADULT MEDICAL EXAMINATION WITHOUT ABNORMAL FINDINGS: ICD-10-CM

## 2024-09-12 DIAGNOSIS — D35.2 BENIGN NEOPLASM OF PITUITARY GLAND: Chronic | ICD-10-CM

## 2024-09-12 LAB
CHOLEST SERPL-MCNC: 179 MG/DL
ESTIMATED AVERAGE GLUCOSE: 111 MG/DL
HBA1C MFR BLD HPLC: 5.5 %
HDLC SERPL-MCNC: 42 MG/DL
LDLC SERPL CALC-MCNC: 112 MG/DL
NONHDLC SERPL-MCNC: 137 MG/DL
TRIGL SERPL-MCNC: 142 MG/DL

## 2024-09-12 PROCEDURE — 74176 CT ABD & PELVIS W/O CONTRAST: CPT

## 2024-09-12 PROCEDURE — 74176 CT ABD & PELVIS W/O CONTRAST: CPT | Mod: 26

## 2024-09-12 PROCEDURE — 36415 COLL VENOUS BLD VENIPUNCTURE: CPT

## 2024-09-12 PROCEDURE — 83036 HEMOGLOBIN GLYCOSYLATED A1C: CPT

## 2024-09-12 PROCEDURE — G0463: CPT

## 2024-09-12 PROCEDURE — 80061 LIPID PANEL: CPT

## 2024-09-12 RX ORDER — AMOXICILLIN AND CLAVULANATE POTASSIUM 875; 125 MG/1; MG/1
875-125 TABLET, COATED ORAL EVERY 8 HOURS
Qty: 30 | Refills: 0 | Status: ACTIVE | COMMUNITY
Start: 2024-09-12 | End: 1900-01-01

## 2024-10-02 ENCOUNTER — APPOINTMENT (OUTPATIENT)
Dept: SURGERY | Facility: HOSPITAL | Age: 64
End: 2024-10-02

## 2024-10-02 ENCOUNTER — OUTPATIENT (OUTPATIENT)
Dept: OUTPATIENT SERVICES | Facility: HOSPITAL | Age: 64
LOS: 1 days | End: 2024-10-02
Payer: MEDICAID

## 2024-10-02 VITALS
SYSTOLIC BLOOD PRESSURE: 105 MMHG | DIASTOLIC BLOOD PRESSURE: 70 MMHG | OXYGEN SATURATION: 98 % | BODY MASS INDEX: 27.8 KG/M2 | HEART RATE: 80 BPM | RESPIRATION RATE: 14 BRPM | TEMPERATURE: 98 F | WEIGHT: 133 LBS

## 2024-10-02 DIAGNOSIS — L72.0 EPIDERMAL CYST: ICD-10-CM

## 2024-10-02 DIAGNOSIS — D35.2 BENIGN NEOPLASM OF PITUITARY GLAND: Chronic | ICD-10-CM

## 2024-10-02 DIAGNOSIS — Z00.00 ENCOUNTER FOR GENERAL ADULT MEDICAL EXAMINATION WITHOUT ABNORMAL FINDINGS: ICD-10-CM

## 2024-10-02 PROCEDURE — G0463: CPT

## 2024-10-09 ENCOUNTER — APPOINTMENT (OUTPATIENT)
Dept: SURGERY | Facility: HOSPITAL | Age: 64
End: 2024-10-09

## 2024-10-09 VITALS
SYSTOLIC BLOOD PRESSURE: 106 MMHG | TEMPERATURE: 97.8 F | OXYGEN SATURATION: 99 % | BODY MASS INDEX: 28.01 KG/M2 | DIASTOLIC BLOOD PRESSURE: 69 MMHG | RESPIRATION RATE: 14 BRPM | WEIGHT: 134 LBS | HEART RATE: 86 BPM

## 2024-10-16 ENCOUNTER — OUTPATIENT (OUTPATIENT)
Dept: OUTPATIENT SERVICES | Facility: HOSPITAL | Age: 64
LOS: 1 days | End: 2024-10-16
Payer: MEDICAID

## 2024-10-16 ENCOUNTER — APPOINTMENT (OUTPATIENT)
Dept: SURGERY | Facility: HOSPITAL | Age: 64
End: 2024-10-16

## 2024-10-16 VITALS
TEMPERATURE: 97.7 F | HEART RATE: 82 BPM | WEIGHT: 134 LBS | SYSTOLIC BLOOD PRESSURE: 90 MMHG | RESPIRATION RATE: 14 BRPM | BODY MASS INDEX: 28.01 KG/M2 | DIASTOLIC BLOOD PRESSURE: 65 MMHG | OXYGEN SATURATION: 99 %

## 2024-10-16 VITALS — DIASTOLIC BLOOD PRESSURE: 80 MMHG | SYSTOLIC BLOOD PRESSURE: 127 MMHG

## 2024-10-16 DIAGNOSIS — L72.0 EPIDERMAL CYST: ICD-10-CM

## 2024-10-16 DIAGNOSIS — Z00.00 ENCOUNTER FOR GENERAL ADULT MEDICAL EXAMINATION WITHOUT ABNORMAL FINDINGS: ICD-10-CM

## 2024-10-16 PROCEDURE — 11770 EXC PILONIDAL CYST SIMPLE: CPT

## 2024-10-18 ENCOUNTER — APPOINTMENT (OUTPATIENT)
Dept: FAMILY MEDICINE | Facility: HOSPITAL | Age: 64
End: 2024-10-18

## 2024-10-23 ENCOUNTER — APPOINTMENT (OUTPATIENT)
Dept: FAMILY MEDICINE | Facility: HOSPITAL | Age: 64
End: 2024-10-23

## 2024-10-23 ENCOUNTER — OUTPATIENT (OUTPATIENT)
Dept: OUTPATIENT SERVICES | Facility: HOSPITAL | Age: 64
LOS: 1 days | End: 2024-10-23
Payer: MEDICAID

## 2024-10-23 VITALS
DIASTOLIC BLOOD PRESSURE: 74 MMHG | BODY MASS INDEX: 28.13 KG/M2 | WEIGHT: 134 LBS | OXYGEN SATURATION: 98 % | HEIGHT: 58 IN | TEMPERATURE: 98.6 F | HEART RATE: 74 BPM | SYSTOLIC BLOOD PRESSURE: 106 MMHG | RESPIRATION RATE: 14 BRPM

## 2024-10-23 DIAGNOSIS — Z00.00 ENCOUNTER FOR GENERAL ADULT MEDICAL EXAMINATION WITHOUT ABNORMAL FINDINGS: ICD-10-CM

## 2024-10-23 DIAGNOSIS — Z48.02 ENCOUNTER FOR REMOVAL OF SUTURES: ICD-10-CM

## 2024-10-23 DIAGNOSIS — D35.2 BENIGN NEOPLASM OF PITUITARY GLAND: Chronic | ICD-10-CM

## 2024-10-23 LAB — CORE LAB BIOPSY: NORMAL

## 2024-10-23 PROCEDURE — G0463: CPT

## 2024-11-03 ENCOUNTER — TRANSCRIPTION ENCOUNTER (OUTPATIENT)
Age: 64
End: 2024-11-03

## 2024-11-04 ENCOUNTER — TRANSCRIPTION ENCOUNTER (OUTPATIENT)
Age: 64
End: 2024-11-04

## 2024-11-06 ENCOUNTER — APPOINTMENT (OUTPATIENT)
Dept: SURGERY | Facility: HOSPITAL | Age: 64
End: 2024-11-06

## 2024-11-07 ENCOUNTER — NON-APPOINTMENT (OUTPATIENT)
Age: 64
End: 2024-11-07

## 2025-02-01 NOTE — BRIEF OPERATIVE NOTE - ANESTHESIOLOGIST NAME
The patient's goals for the shift include      The clinical goals for the shift include Maintain pt safety/comfort; monitor labs/vitals        Problem: Safety - Adult  Goal: Free from fall injury  Outcome: Progressing     Problem: Pain - Adult  Goal: Verbalizes/displays adequate comfort level or baseline comfort level  Outcome: Progressing     Problem: Chronic Conditions and Co-morbidities  Goal: Patient's chronic conditions and co-morbidity symptoms are monitored and maintained or improved  Outcome: Progressing     Problem: Discharge Planning  Goal: Discharge to home or other facility with appropriate resources  Outcome: Progressing      ginger

## 2025-04-09 NOTE — ED ADULT NURSE NOTE - CAS TRG GENERAL NORM CIRC DET
History of lumbar spinal stenosis.  Pt has had intermittent knee pain requiring Oxycodone at times, additionally   remains on Tylenol 975 mg every 8 hours.  Continue same for now   Strong peripheral pulses
